# Patient Record
Sex: MALE | Race: WHITE | NOT HISPANIC OR LATINO | Employment: OTHER | URBAN - METROPOLITAN AREA
[De-identification: names, ages, dates, MRNs, and addresses within clinical notes are randomized per-mention and may not be internally consistent; named-entity substitution may affect disease eponyms.]

---

## 2023-10-12 ENCOUNTER — HOSPITAL ENCOUNTER (EMERGENCY)
Facility: HOSPITAL | Age: 83
Discharge: HOME | End: 2023-10-12
Attending: EMERGENCY MEDICINE
Payer: MEDICARE

## 2023-10-12 ENCOUNTER — APPOINTMENT (OUTPATIENT)
Dept: RADIOLOGY | Facility: HOSPITAL | Age: 83
End: 2023-10-12
Payer: MEDICARE

## 2023-10-12 VITALS
TEMPERATURE: 97.2 F | DIASTOLIC BLOOD PRESSURE: 79 MMHG | HEIGHT: 70 IN | WEIGHT: 130 LBS | HEART RATE: 56 BPM | BODY MASS INDEX: 18.61 KG/M2 | SYSTOLIC BLOOD PRESSURE: 132 MMHG | RESPIRATION RATE: 20 BRPM | OXYGEN SATURATION: 96 %
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
## 2023-10-12 LAB
ALBUMIN SERPL BCP-MCNC: 3.9 G/DL (ref 3.4–5)
ALP SERPL-CCNC: 63 U/L (ref 33–136)
ALT SERPL W P-5'-P-CCNC: 13 U/L (ref 10–52)
ANION GAP SERPL CALC-SCNC: 11 MMOL/L (ref 10–20)
APTT PPP: 29 SECONDS (ref 27–38)
AST SERPL W P-5'-P-CCNC: 19 U/L (ref 9–39)
BILIRUB SERPL-MCNC: 1.3 MG/DL (ref 0–1.2)
BUN SERPL-MCNC: 22 MG/DL (ref 6–23)
CALCIUM SERPL-MCNC: 8.9 MG/DL (ref 8.6–10.3)
CHLORIDE SERPL-SCNC: 100 MMOL/L (ref 98–107)
CO2 SERPL-SCNC: 32 MMOL/L (ref 21–32)
CREAT SERPL-MCNC: 0.73 MG/DL (ref 0.5–1.3)
ERYTHROCYTE [DISTWIDTH] IN BLOOD BY AUTOMATED COUNT: 13.9 % (ref 11.5–14.5)
GFR SERPL CREATININE-BSD FRML MDRD: >90 ML/MIN/1.73M*2
GLUCOSE SERPL-MCNC: 100 MG/DL (ref 74–99)
HCT VFR BLD AUTO: 44.1 % (ref 41–52)
HGB BLD-MCNC: 14.7 G/DL (ref 13.5–17.5)
INR PPP: 1.1 (ref 0.9–1.1)
MCH RBC QN AUTO: 30.5 PG (ref 26–34)
MCHC RBC AUTO-ENTMCNC: 33.3 G/DL (ref 32–36)
MCV RBC AUTO: 92 FL (ref 80–100)
NRBC BLD-RTO: 0 /100 WBCS (ref 0–0)
PLATELET # BLD AUTO: 151 X10*3/UL (ref 150–450)
PMV BLD AUTO: 10.7 FL (ref 7.5–11.5)
POTASSIUM SERPL-SCNC: 3.7 MMOL/L (ref 3.5–5.3)
PROT SERPL-MCNC: 6.2 G/DL (ref 6.4–8.2)
PROTHROMBIN TIME: 12 SECONDS (ref 9.8–12.8)
RBC # BLD AUTO: 4.82 X10*6/UL (ref 4.5–5.9)
SODIUM SERPL-SCNC: 139 MMOL/L (ref 136–145)
WBC # BLD AUTO: 7.7 X10*3/UL (ref 4.4–11.3)

## 2023-10-12 PROCEDURE — 36415 COLL VENOUS BLD VENIPUNCTURE: CPT

## 2023-10-12 PROCEDURE — 70450 CT HEAD/BRAIN W/O DYE: CPT | Mod: MG

## 2023-10-12 PROCEDURE — 70450 CT HEAD/BRAIN W/O DYE: CPT | Performed by: RADIOLOGY

## 2023-10-12 PROCEDURE — 70486 CT MAXILLOFACIAL W/O DYE: CPT | Performed by: RADIOLOGY

## 2023-10-12 PROCEDURE — 99285 EMERGENCY DEPT VISIT HI MDM: CPT | Mod: 25 | Performed by: EMERGENCY MEDICINE

## 2023-10-12 PROCEDURE — 85027 COMPLETE CBC AUTOMATED: CPT

## 2023-10-12 PROCEDURE — G1004 CDSM NDSC: HCPCS

## 2023-10-12 PROCEDURE — 72125 CT NECK SPINE W/O DYE: CPT | Performed by: RADIOLOGY

## 2023-10-12 PROCEDURE — 12011 RPR F/E/E/N/L/M 2.5 CM/<: CPT | Performed by: EMERGENCY MEDICINE

## 2023-10-12 PROCEDURE — 85730 THROMBOPLASTIN TIME PARTIAL: CPT

## 2023-10-12 PROCEDURE — G0390 TRAUMA RESPONS W/HOSP CRITI: HCPCS | Performed by: EMERGENCY MEDICINE

## 2023-10-12 PROCEDURE — 80053 COMPREHEN METABOLIC PANEL: CPT

## 2023-10-12 PROCEDURE — 2500000004 HC RX 250 GENERAL PHARMACY W/ HCPCS (ALT 636 FOR OP/ED)

## 2023-10-12 PROCEDURE — 2500000005 HC RX 250 GENERAL PHARMACY W/O HCPCS

## 2023-10-12 PROCEDURE — 99284 EMERGENCY DEPT VISIT MOD MDM: CPT | Performed by: EMERGENCY MEDICINE

## 2023-10-12 PROCEDURE — 70486 CT MAXILLOFACIAL W/O DYE: CPT | Mod: MG

## 2023-10-12 PROCEDURE — 90715 TDAP VACCINE 7 YRS/> IM: CPT

## 2023-10-12 PROCEDURE — 76377 3D RENDER W/INTRP POSTPROCES: CPT | Performed by: RADIOLOGY

## 2023-10-12 PROCEDURE — 85610 PROTHROMBIN TIME: CPT

## 2023-10-12 PROCEDURE — 90471 IMMUNIZATION ADMIN: CPT

## 2023-10-12 PROCEDURE — 12013 RPR F/E/E/N/L/M 2.6-5.0 CM: CPT | Performed by: EMERGENCY MEDICINE

## 2023-10-12 RX ORDER — LIDOCAINE HYDROCHLORIDE AND EPINEPHRINE 10; 10 MG/ML; UG/ML
1 INJECTION, SOLUTION INFILTRATION; PERINEURAL ONCE
Status: COMPLETED | OUTPATIENT
Start: 2023-10-12 | End: 2023-10-12

## 2023-10-12 RX ORDER — OXYMETAZOLINE HCL 0.05 %
2 SPRAY, NON-AEROSOL (ML) NASAL ONCE
Status: DISCONTINUED | OUTPATIENT
Start: 2023-10-12 | End: 2023-10-12 | Stop reason: HOSPADM

## 2023-10-12 RX ORDER — LIDOCAINE HYDROCHLORIDE 10 MG/ML
9 INJECTION INFILTRATION; PERINEURAL ONCE
Status: DISCONTINUED | OUTPATIENT
Start: 2023-10-12 | End: 2023-10-12

## 2023-10-12 RX ORDER — LIDOCAINE HYDROCHLORIDE AND EPINEPHRINE 10; 10 MG/ML; UG/ML
10 INJECTION, SOLUTION INFILTRATION; PERINEURAL ONCE
Status: DISCONTINUED | OUTPATIENT
Start: 2023-10-12 | End: 2023-10-12

## 2023-10-12 RX ORDER — ACETAMINOPHEN 325 MG/1
650 TABLET ORAL ONCE
Status: COMPLETED | OUTPATIENT
Start: 2023-10-12 | End: 2023-10-12

## 2023-10-12 RX ORDER — CEPHALEXIN 500 MG/1
500 CAPSULE ORAL 4 TIMES DAILY
Qty: 40 CAPSULE | Refills: 0 | Status: SHIPPED | OUTPATIENT
Start: 2023-10-12 | End: 2023-10-22

## 2023-10-12 RX ORDER — LIDOCAINE HYDROCHLORIDE AND EPINEPHRINE 10; 10 MG/ML; UG/ML
INJECTION, SOLUTION INFILTRATION; PERINEURAL
Status: DISCONTINUED
Start: 2023-10-12 | End: 2023-10-12 | Stop reason: HOSPADM

## 2023-10-12 RX ORDER — LIDOCAINE HYDROCHLORIDE 10 MG/ML
INJECTION, SOLUTION EPIDURAL; INFILTRATION; INTRACAUDAL; PERINEURAL
Status: DISCONTINUED
Start: 2023-10-12 | End: 2023-10-12 | Stop reason: WASHOUT

## 2023-10-12 RX ORDER — LIDOCAINE HYDROCHLORIDE AND EPINEPHRINE 10; 10 MG/ML; UG/ML
30 INJECTION, SOLUTION INFILTRATION; PERINEURAL ONCE
Status: DISCONTINUED | OUTPATIENT
Start: 2023-10-12 | End: 2023-10-12

## 2023-10-12 RX ADMIN — ACETAMINOPHEN 650 MG: 325 TABLET ORAL at 16:57

## 2023-10-12 RX ADMIN — TETANUS TOXOID, REDUCED DIPHTHERIA TOXOID AND ACELLULAR PERTUSSIS VACCINE, ADSORBED 0.5 ML: 5; 2.5; 8; 8; 2.5 SUSPENSION INTRAMUSCULAR at 15:45

## 2023-10-12 RX ADMIN — LIDOCAINE HYDROCHLORIDE AND EPINEPHRINE 1 ML: 10; 10 INJECTION, SOLUTION INFILTRATION; PERINEURAL at 16:26

## 2023-10-12 ASSESSMENT — PAIN SCALES - GENERAL
PAINLEVEL_OUTOF10: 3
PAINLEVEL_OUTOF10: 2
PAINLEVEL_OUTOF10: 2
PAINLEVEL_OUTOF10: 3
PAINLEVEL_OUTOF10: 2

## 2023-10-12 ASSESSMENT — LIFESTYLE VARIABLES
EVER FELT BAD OR GUILTY ABOUT YOUR DRINKING: NO
HAVE PEOPLE ANNOYED YOU BY CRITICIZING YOUR DRINKING: NO
HAVE YOU EVER FELT YOU SHOULD CUT DOWN ON YOUR DRINKING: NO
EVER HAD A DRINK FIRST THING IN THE MORNING TO STEADY YOUR NERVES TO GET RID OF A HANGOVER: NO
REASON UNABLE TO ASSESS: NO

## 2023-10-12 ASSESSMENT — COLUMBIA-SUICIDE SEVERITY RATING SCALE - C-SSRS
6. HAVE YOU EVER DONE ANYTHING, STARTED TO DO ANYTHING, OR PREPARED TO DO ANYTHING TO END YOUR LIFE?: NO
2. HAVE YOU ACTUALLY HAD ANY THOUGHTS OF KILLING YOURSELF?: NO
1. IN THE PAST MONTH, HAVE YOU WISHED YOU WERE DEAD OR WISHED YOU COULD GO TO SLEEP AND NOT WAKE UP?: NO

## 2023-10-12 ASSESSMENT — PAIN - FUNCTIONAL ASSESSMENT: PAIN_FUNCTIONAL_ASSESSMENT: 0-10

## 2023-10-12 NOTE — DISCHARGE INSTRUCTIONS
You are seen in the emergency room after an acute fall.  It appears that you broke your nose you should follow-up with the ENT as soon as possible.  You had a mild laceration to your forehead that was repaired with 3 sutures.  Please return in 7 to 10 days for suture removal.  He will be discharged with instructions on maintaining your lacerations.  If you have any fever new pain any focal neurological deficits such as dizziness numbness in your face or limbs or inability to walk straight please come back to emergency room as soon as possible.

## 2023-10-12 NOTE — ED NOTES
Discharge instructions reviewed with patient. No further questions or concerns at this time. Patient stable at time of discharge.      Marlin White RN  10/12/23 [id number]

## 2023-10-12 NOTE — ED TRIAGE NOTES
Pt presents to ED for mechanical fall. Pt stated he tripped over a parking block and fell. Pt did not brace himself and he hit his head. Pt denies loss of consciousness, denies blood thinners. Placed in C collar and nose clamp by EMS.

## 2023-10-12 NOTE — ED PROVIDER NOTES
HPI   Chief Complaint   Patient presents with    Fall     Mechanical fall       HPI       83-year-old male presents emergency room after a fall.  Patient indicates he was walking along and tripped over a parking block fell face first.  Patient did hit his head but had no apparent loss of consciousness.  HIA protocol was followed as the patient is on clopidogrel and takes aspirin.  Patient denies any headache or fever.  Patient was placed in a c-collar and nose clamp by EMS as he has a profusely bloody nose.    PMHx: Not available  PSHx: Not available  FHx: No pertinent family history  SocHX:     EtOH: Social alcohol use      Tobacco:  none     Other illicits:  none  Allergies:  NKDA    ROS  Constitutional:      Denies: Fever, fatigue     Reports: None  Neuro:      Denies: , numbness, tingling     Reports: Headache patient endorses a mild headache after the fall.  Indicates pain severity is 1-2 out of 10.  Psych:      Denies: anorexia, SI, HI     Reports: None  HEENT:      Denies: vision change, congestion, sore throat     Reports: None  Cardiovascular:      Denies: Chest pain, palpitations, orthopnea     Reports: None  Pulmonary:      Denies: shortness of breath, cough, hemoptysis     Reports: None  Gastrointestinal:      Denies: Abdominal pain, nausea, vomiting, constipation, diarrhea, bright red stools, black stools     Reports: None  Genitourinary:      Denies: Flank pain, painful urination, frequent urination, discharge     Reports: None  Musculoskeletal:     Denies: loss of ROM, extremity pain, back pain     Reports: None  Integumentary:     Denies: Rash, itching     Reports: None    PHYSICAL EXAM  Constitutional: Well appearing, no acute distress, oriented to person/place/time  Psych: Age appropriate insight, judgement, no psychomotor agitation  Neuro: GCS 15, spontaneously moves all extremities  Head: Patient has large hematoma directly in the center of his forehead.  As well as a possible fractured nose.   Patient has blood in both nares and a possible small laceration in the hematoma on his forehead measuring approximately 4 cm.  Eyes: Spontaneously open, PERRL, EOMI, no scleral icterus or conjunctival injection  ENT: No gross deformation, mucous membranes moist, trachea midline, no uvular deviation  Neck: Patient is in a neck collar.  Will reevaluate after CT scan  Respiratory:  respirations nonlabored, equal chest rise, no wheezes rales or rhonchi  Cardiovascular: Regular rate and rhythm,   distal pulses 2+ symmetric  Gastrointestinal: No gross deformation, normal bowel sounds, soft, nontender, nondistended  Musculoskeletal: Head to toe trauma assessment was conducted.  Patient denies any pain elsewhere other than his head where he received the laceration.  Integumentary: Warm, dry, no rashes    MDM/ED Course  83-year-old male presents emergency room after a fall.  Medical management and treatment in the emergency room will involve CT Noncon of the head cervical spine and maxillofacial area face.,  Blood work will be run.  We also ordered a tetanus shot for the patient.  The patient's forehead laceration was repaired with three 6-0 sutures.  The patient also had some mild abrasion to the tip of his nose.  The patient has mild abrasions that do not require lacerations to the rest of the forehead and inside the mouth.  The patient will be given Tylenol for pain management and most likely discharge today with take-home antibiotics and follow-up instructions.    Chronic conditions: See chart review.    External records review: inpatient notes, outpatient records  History obtained from: patient/chart review        I reviewed the case with the attending ED physician. The attending ED physician agrees with the plan. Patient and/or patient´s representative was counseled regarding labs, imaging, likely diagnosis, and plan. All questions were answered.  Suresh Tapia MD  PGY-1  Emergency Medicine      Please excuse any  misspellings or unintended errors related to the Dragon speech recognition software used to dictate this note.                Denis Coma Scale Score: 15                  Patient History   No past medical history on file.  No past surgical history on file.  No family history on file.  Social History     Tobacco Use    Smoking status: Not on file    Smokeless tobacco: Not on file   Substance Use Topics    Alcohol use: Not on file    Drug use: Not on file       Physical Exam   ED Triage Vitals [10/12/23 1317]   Temp Heart Rate Resp BP   36.4 °C (97.5 °F) 100 16 (!) 145/96      SpO2 Temp Source Heart Rate Source Patient Position   95 % Tympanic -- --      BP Location FiO2 (%)     -- --       Physical Exam    ED Course & MDM   Diagnoses as of 10/12/23 1742   Fall, initial encounter       Medical Decision Making      Procedure  Procedures patient had a mild laceration on his forehead between his eyebrows.  Required three 6-0 sutures.  The wound was cleaned with iodine and normal saline and anesthetized with epinephrine and lidocaine.  Approxi-1 mL.  Patient will be sent home with take-home instructions for removal of sutures and cleaning of his lacerations.     Suresh Tapia MD  Resident  10/12/23 1652       Suresh Tapia MD  Resident  10/12/23 1742

## 2024-12-01 ENCOUNTER — APPOINTMENT (OUTPATIENT)
Dept: RADIOLOGY | Facility: HOSPITAL | Age: 84
End: 2024-12-01
Payer: MEDICARE

## 2024-12-01 ENCOUNTER — HOSPITAL ENCOUNTER (OUTPATIENT)
Facility: HOSPITAL | Age: 84
Setting detail: OBSERVATION
End: 2024-12-01
Attending: EMERGENCY MEDICINE | Admitting: INTERNAL MEDICINE
Payer: MEDICARE

## 2024-12-01 ENCOUNTER — APPOINTMENT (OUTPATIENT)
Dept: CARDIOLOGY | Facility: HOSPITAL | Age: 84
End: 2024-12-01
Payer: MEDICARE

## 2024-12-01 DIAGNOSIS — I26.99 PULMONARY EMBOLISM ON RIGHT (MULTI): Primary | ICD-10-CM

## 2024-12-01 DIAGNOSIS — I26.99 ACUTE PULMONARY EMBOLISM WITHOUT ACUTE COR PULMONALE, UNSPECIFIED PULMONARY EMBOLISM TYPE (MULTI): ICD-10-CM
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
## 2024-12-01 PROBLEM — I10 HTN (HYPERTENSION): Status: ACTIVE | Noted: 2024-12-01

## 2024-12-01 PROBLEM — E78.89 ELEVATED HDL: Status: ACTIVE | Noted: 2024-12-01

## 2024-12-01 PROBLEM — J96.11 CHRONIC HYPOXIC RESPIRATORY FAILURE, ON HOME OXYGEN THERAPY (MULTI): Status: ACTIVE | Noted: 2024-12-01

## 2024-12-01 PROBLEM — Z99.81 CHRONIC HYPOXIC RESPIRATORY FAILURE, ON HOME OXYGEN THERAPY (MULTI): Status: ACTIVE | Noted: 2024-12-01

## 2024-12-01 PROBLEM — R91.1 INCIDENTAL PULMONARY NODULE: Status: ACTIVE | Noted: 2024-12-01

## 2024-12-01 LAB
ABO GROUP (TYPE) IN BLOOD: NORMAL
ALBUMIN SERPL BCP-MCNC: 3.9 G/DL (ref 3.4–5)
ALP SERPL-CCNC: 75 U/L (ref 33–136)
ALT SERPL W P-5'-P-CCNC: 43 U/L (ref 10–52)
ANION GAP SERPL CALC-SCNC: 14 MMOL/L (ref 10–20)
ANTIBODY SCREEN: NORMAL
APPEARANCE UR: ABNORMAL
AST SERPL W P-5'-P-CCNC: 126 U/L (ref 9–39)
BACTERIA BLD CULT: NORMAL
BACTERIA BLD CULT: NORMAL
BASOPHILS # BLD AUTO: 0.02 X10*3/UL (ref 0–0.1)
BASOPHILS NFR BLD AUTO: 0.1 %
BILIRUB SERPL-MCNC: 1.3 MG/DL (ref 0–1.2)
BILIRUB UR STRIP.AUTO-MCNC: NEGATIVE MG/DL
BNP SERPL-MCNC: 1067 PG/ML (ref 0–99)
BUN SERPL-MCNC: 56 MG/DL (ref 6–23)
CALCIUM SERPL-MCNC: 9.5 MG/DL (ref 8.6–10.3)
CARDIAC TROPONIN I PNL SERPL HS: 113 NG/L (ref 0–20)
CARDIAC TROPONIN I PNL SERPL HS: 127 NG/L (ref 0–20)
CARDIAC TROPONIN I PNL SERPL HS: 137 NG/L (ref 0–20)
CHLORIDE SERPL-SCNC: 105 MMOL/L (ref 98–107)
CK SERPL-CCNC: 2745 U/L (ref 0–325)
CK SERPL-CCNC: 2758 U/L (ref 0–325)
CO2 SERPL-SCNC: 33 MMOL/L (ref 21–32)
COLOR UR: YELLOW
CREAT SERPL-MCNC: 0.97 MG/DL (ref 0.5–1.3)
D DIMER PPP FEU-MCNC: ABNORMAL NG/ML FEU
EGFRCR SERPLBLD CKD-EPI 2021: 78 ML/MIN/1.73M*2
EOSINOPHIL # BLD AUTO: 0 X10*3/UL (ref 0–0.4)
EOSINOPHIL NFR BLD AUTO: 0 %
ERYTHROCYTE [DISTWIDTH] IN BLOOD BY AUTOMATED COUNT: 13.9 % (ref 11.5–14.5)
ETHANOL SERPL-MCNC: <10 MG/DL
GLUCOSE SERPL-MCNC: 118 MG/DL (ref 74–99)
GLUCOSE UR STRIP.AUTO-MCNC: NORMAL MG/DL
HCT VFR BLD AUTO: 46.3 % (ref 41–52)
HGB BLD-MCNC: 14.6 G/DL (ref 13.5–17.5)
HOLD SPECIMEN: NORMAL
HYALINE CASTS #/AREA URNS AUTO: ABNORMAL /LPF
IMM GRANULOCYTES # BLD AUTO: 0.19 X10*3/UL (ref 0–0.5)
IMM GRANULOCYTES NFR BLD AUTO: 0.9 % (ref 0–0.9)
INR PPP: 1 (ref 0.9–1.1)
KETONES UR STRIP.AUTO-MCNC: ABNORMAL MG/DL
LACTATE SERPL-SCNC: 1.8 MMOL/L (ref 0.4–2)
LEUKOCYTE ESTERASE UR QL STRIP.AUTO: NEGATIVE
LYMPHOCYTES # BLD AUTO: 1.01 X10*3/UL (ref 0.8–3)
LYMPHOCYTES NFR BLD AUTO: 4.8 %
MCH RBC QN AUTO: 29.7 PG (ref 26–34)
MCHC RBC AUTO-ENTMCNC: 31.5 G/DL (ref 32–36)
MCV RBC AUTO: 94 FL (ref 80–100)
MONOCYTES # BLD AUTO: 1.54 X10*3/UL (ref 0.05–0.8)
MONOCYTES NFR BLD AUTO: 7.4 %
MUCOUS THREADS #/AREA URNS AUTO: ABNORMAL /LPF
NEUTROPHILS # BLD AUTO: 18.17 X10*3/UL (ref 1.6–5.5)
NEUTROPHILS NFR BLD AUTO: 86.8 %
NITRITE UR QL STRIP.AUTO: NEGATIVE
NRBC BLD-RTO: 0 /100 WBCS (ref 0–0)
PH UR STRIP.AUTO: 6 [PH]
PLATELET # BLD AUTO: 206 X10*3/UL (ref 150–450)
POTASSIUM SERPL-SCNC: 4.6 MMOL/L (ref 3.5–5.3)
PROT SERPL-MCNC: 7 G/DL (ref 6.4–8.2)
PROT UR STRIP.AUTO-MCNC: ABNORMAL MG/DL
PROTHROMBIN TIME: 10.7 SECONDS (ref 9.8–12.8)
RBC # BLD AUTO: 4.91 X10*6/UL (ref 4.5–5.9)
RBC # UR STRIP.AUTO: ABNORMAL /UL
RBC #/AREA URNS AUTO: ABNORMAL /HPF
RH FACTOR (ANTIGEN D): NORMAL
SODIUM SERPL-SCNC: 147 MMOL/L (ref 136–145)
SP GR UR STRIP.AUTO: >1.05
SQUAMOUS #/AREA URNS AUTO: ABNORMAL /HPF
UFH PPP CHRO-ACNC: 0.5 IU/ML
UROBILINOGEN UR STRIP.AUTO-MCNC: NORMAL MG/DL
WBC # BLD AUTO: 20.9 X10*3/UL (ref 4.4–11.3)
WBC #/AREA URNS AUTO: ABNORMAL /HPF

## 2024-12-01 PROCEDURE — 74177 CT ABD & PELVIS W/CONTRAST: CPT | Mod: RCN | Performed by: RADIOLOGY

## 2024-12-01 PROCEDURE — 96365 THER/PROPH/DIAG IV INF INIT: CPT

## 2024-12-01 PROCEDURE — 86900 BLOOD TYPING SEROLOGIC ABO: CPT

## 2024-12-01 PROCEDURE — 93005 ELECTROCARDIOGRAM TRACING: CPT

## 2024-12-01 PROCEDURE — 2500000004 HC RX 250 GENERAL PHARMACY W/ HCPCS (ALT 636 FOR OP/ED): Performed by: EMERGENCY MEDICINE

## 2024-12-01 PROCEDURE — 2500000001 HC RX 250 WO HCPCS SELF ADMINISTERED DRUGS (ALT 637 FOR MEDICARE OP): Performed by: INTERNAL MEDICINE

## 2024-12-01 PROCEDURE — 90471 IMMUNIZATION ADMIN: CPT

## 2024-12-01 PROCEDURE — 72128 CT CHEST SPINE W/O DYE: CPT | Mod: RCN

## 2024-12-01 PROCEDURE — 36415 COLL VENOUS BLD VENIPUNCTURE: CPT

## 2024-12-01 PROCEDURE — 73590 X-RAY EXAM OF LOWER LEG: CPT | Mod: BILATERAL PROCEDURE | Performed by: RADIOLOGY

## 2024-12-01 PROCEDURE — 80053 COMPREHEN METABOLIC PANEL: CPT

## 2024-12-01 PROCEDURE — 99285 EMERGENCY DEPT VISIT HI MDM: CPT | Mod: 25

## 2024-12-01 PROCEDURE — 72131 CT LUMBAR SPINE W/O DYE: CPT | Mod: RCN

## 2024-12-01 PROCEDURE — 83605 ASSAY OF LACTIC ACID: CPT

## 2024-12-01 PROCEDURE — 72128 CT CHEST SPINE W/O DYE: CPT | Mod: RCN | Performed by: RADIOLOGY

## 2024-12-01 PROCEDURE — 51798 US URINE CAPACITY MEASURE: CPT

## 2024-12-01 PROCEDURE — 84484 ASSAY OF TROPONIN QUANT: CPT

## 2024-12-01 PROCEDURE — 70450 CT HEAD/BRAIN W/O DYE: CPT

## 2024-12-01 PROCEDURE — 87040 BLOOD CULTURE FOR BACTERIA: CPT | Mod: STJLAB

## 2024-12-01 PROCEDURE — 73590 X-RAY EXAM OF LOWER LEG: CPT | Mod: 50

## 2024-12-01 PROCEDURE — 82077 ASSAY SPEC XCP UR&BREATH IA: CPT

## 2024-12-01 PROCEDURE — 82550 ASSAY OF CK (CPK): CPT

## 2024-12-01 PROCEDURE — 96361 HYDRATE IV INFUSION ADD-ON: CPT

## 2024-12-01 PROCEDURE — 2500000004 HC RX 250 GENERAL PHARMACY W/ HCPCS (ALT 636 FOR OP/ED)

## 2024-12-01 PROCEDURE — 72170 X-RAY EXAM OF PELVIS: CPT

## 2024-12-01 PROCEDURE — 83880 ASSAY OF NATRIURETIC PEPTIDE: CPT

## 2024-12-01 PROCEDURE — 71260 CT THORAX DX C+: CPT | Mod: RCN | Performed by: RADIOLOGY

## 2024-12-01 PROCEDURE — 71101 X-RAY EXAM UNILAT RIBS/CHEST: CPT | Mod: LT

## 2024-12-01 PROCEDURE — 71101 X-RAY EXAM UNILAT RIBS/CHEST: CPT | Mod: LEFT SIDE | Performed by: RADIOLOGY

## 2024-12-01 PROCEDURE — 2500000005 HC RX 250 GENERAL PHARMACY W/O HCPCS: Performed by: EMERGENCY MEDICINE

## 2024-12-01 PROCEDURE — 73552 X-RAY EXAM OF FEMUR 2/>: CPT | Mod: 50

## 2024-12-01 PROCEDURE — 96366 THER/PROPH/DIAG IV INF ADDON: CPT

## 2024-12-01 PROCEDURE — 72125 CT NECK SPINE W/O DYE: CPT

## 2024-12-01 PROCEDURE — 72170 X-RAY EXAM OF PELVIS: CPT | Mod: FOREIGN READ | Performed by: RADIOLOGY

## 2024-12-01 PROCEDURE — 85379 FIBRIN DEGRADATION QUANT: CPT

## 2024-12-01 PROCEDURE — 71275 CT ANGIOGRAPHY CHEST: CPT

## 2024-12-01 PROCEDURE — 99285 EMERGENCY DEPT VISIT HI MDM: CPT | Performed by: EMERGENCY MEDICINE

## 2024-12-01 PROCEDURE — 99222 1ST HOSP IP/OBS MODERATE 55: CPT | Performed by: NURSE PRACTITIONER

## 2024-12-01 PROCEDURE — G0378 HOSPITAL OBSERVATION PER HR: HCPCS

## 2024-12-01 PROCEDURE — 96367 TX/PROPH/DG ADDL SEQ IV INF: CPT

## 2024-12-01 PROCEDURE — G0390 TRAUMA RESPONS W/HOSP CRITI: HCPCS

## 2024-12-01 PROCEDURE — 74177 CT ABD & PELVIS W/CONTRAST: CPT

## 2024-12-01 PROCEDURE — 85610 PROTHROMBIN TIME: CPT

## 2024-12-01 PROCEDURE — 71275 CT ANGIOGRAPHY CHEST: CPT | Mod: FOREIGN READ | Performed by: RADIOLOGY

## 2024-12-01 PROCEDURE — 81001 URINALYSIS AUTO W/SCOPE: CPT

## 2024-12-01 PROCEDURE — 85520 HEPARIN ASSAY: CPT | Performed by: INTERNAL MEDICINE

## 2024-12-01 PROCEDURE — 85025 COMPLETE CBC W/AUTO DIFF WBC: CPT

## 2024-12-01 PROCEDURE — 72125 CT NECK SPINE W/O DYE: CPT | Performed by: RADIOLOGY

## 2024-12-01 PROCEDURE — 70450 CT HEAD/BRAIN W/O DYE: CPT | Performed by: RADIOLOGY

## 2024-12-01 PROCEDURE — 72131 CT LUMBAR SPINE W/O DYE: CPT | Mod: RCN | Performed by: RADIOLOGY

## 2024-12-01 PROCEDURE — 73552 X-RAY EXAM OF FEMUR 2/>: CPT | Mod: BILATERAL PROCEDURE | Performed by: RADIOLOGY

## 2024-12-01 PROCEDURE — 90715 TDAP VACCINE 7 YRS/> IM: CPT

## 2024-12-01 PROCEDURE — 2550000001 HC RX 255 CONTRASTS: Performed by: EMERGENCY MEDICINE

## 2024-12-01 RX ORDER — ACETAMINOPHEN 650 MG/1
650 SUPPOSITORY RECTAL EVERY 4 HOURS PRN
Status: DISCONTINUED | OUTPATIENT
Start: 2024-12-01 | End: 2024-12-06 | Stop reason: HOSPADM

## 2024-12-01 RX ORDER — ALBUTEROL SULFATE 0.83 MG/ML
2.5 SOLUTION RESPIRATORY (INHALATION) EVERY 6 HOURS PRN
Status: DISCONTINUED | OUTPATIENT
Start: 2024-12-01 | End: 2024-12-06 | Stop reason: HOSPADM

## 2024-12-01 RX ORDER — CLOPIDOGREL BISULFATE 75 MG/1
75 TABLET ORAL DAILY
Status: DISCONTINUED | OUTPATIENT
Start: 2024-12-01 | End: 2024-12-06 | Stop reason: HOSPADM

## 2024-12-01 RX ORDER — FLUTICASONE FUROATE AND VILANTEROL 200; 25 UG/1; UG/1
1 POWDER RESPIRATORY (INHALATION)
Status: DISCONTINUED | OUTPATIENT
Start: 2024-12-02 | End: 2024-12-01

## 2024-12-01 RX ORDER — HEPARIN SODIUM 5000 [USP'U]/ML
80 INJECTION, SOLUTION INTRAVENOUS; SUBCUTANEOUS ONCE
Status: COMPLETED | OUTPATIENT
Start: 2024-12-01 | End: 2024-12-01

## 2024-12-01 RX ORDER — TIOTROPIUM BROMIDE 18 UG/1
1 CAPSULE ORAL; RESPIRATORY (INHALATION)
Status: DISCONTINUED | OUTPATIENT
Start: 2024-12-02 | End: 2024-12-01

## 2024-12-01 RX ORDER — VANCOMYCIN HYDROCHLORIDE 500 MG/100ML
500 INJECTION, SOLUTION INTRAVENOUS ONCE
Status: COMPLETED | OUTPATIENT
Start: 2024-12-01 | End: 2024-12-01

## 2024-12-01 RX ORDER — TAMSULOSIN HYDROCHLORIDE 0.4 MG/1
0.4 CAPSULE ORAL NIGHTLY
COMMUNITY

## 2024-12-01 RX ORDER — BUDESONIDE 0.5 MG/2ML
0.5 INHALANT ORAL
Status: DISCONTINUED | OUTPATIENT
Start: 2024-12-01 | End: 2024-12-06 | Stop reason: HOSPADM

## 2024-12-01 RX ORDER — ALBUTEROL SULFATE 90 UG/1
2 INHALANT RESPIRATORY (INHALATION) EVERY 6 HOURS PRN
Status: DISCONTINUED | OUTPATIENT
Start: 2024-12-01 | End: 2024-12-01

## 2024-12-01 RX ORDER — DONEPEZIL HYDROCHLORIDE 10 MG/1
10 TABLET, FILM COATED ORAL NIGHTLY
COMMUNITY

## 2024-12-01 RX ORDER — ATORVASTATIN CALCIUM 20 MG/1
20 TABLET, FILM COATED ORAL DAILY
COMMUNITY

## 2024-12-01 RX ORDER — VANCOMYCIN HYDROCHLORIDE 1 G/200ML
1 INJECTION, SOLUTION INTRAVENOUS ONCE
Status: DISCONTINUED | OUTPATIENT
Start: 2024-12-01 | End: 2024-12-01

## 2024-12-01 RX ORDER — CARVEDILOL 3.12 MG/1
3.12 TABLET ORAL 2 TIMES DAILY
Status: DISCONTINUED | OUTPATIENT
Start: 2024-12-01 | End: 2024-12-02

## 2024-12-01 RX ORDER — CLOPIDOGREL BISULFATE 75 MG/1
75 TABLET ORAL DAILY
COMMUNITY

## 2024-12-01 RX ORDER — ACETAMINOPHEN 325 MG/1
650 TABLET ORAL EVERY 4 HOURS PRN
Status: DISCONTINUED | OUTPATIENT
Start: 2024-12-01 | End: 2024-12-06 | Stop reason: HOSPADM

## 2024-12-01 RX ORDER — MIRTAZAPINE 15 MG/1
7.5 TABLET, FILM COATED ORAL NIGHTLY
Status: DISCONTINUED | OUTPATIENT
Start: 2024-12-01 | End: 2024-12-06 | Stop reason: HOSPADM

## 2024-12-01 RX ORDER — CARVEDILOL 6.25 MG/1
3.12 TABLET ORAL 2 TIMES DAILY
COMMUNITY

## 2024-12-01 RX ORDER — MIRTAZAPINE 15 MG/1
7.5 TABLET, FILM COATED ORAL NIGHTLY
COMMUNITY

## 2024-12-01 RX ORDER — ONDANSETRON 4 MG/1
4 TABLET, FILM COATED ORAL EVERY 8 HOURS PRN
Status: DISCONTINUED | OUTPATIENT
Start: 2024-12-01 | End: 2024-12-06 | Stop reason: HOSPADM

## 2024-12-01 RX ORDER — FORMOTEROL FUMARATE DIHYDRATE 20 UG/2ML
20 SOLUTION RESPIRATORY (INHALATION)
Status: DISCONTINUED | OUTPATIENT
Start: 2024-12-01 | End: 2024-12-06 | Stop reason: HOSPADM

## 2024-12-01 RX ORDER — ALBUTEROL SULFATE 90 UG/1
2 INHALANT RESPIRATORY (INHALATION) EVERY 6 HOURS PRN
COMMUNITY

## 2024-12-01 RX ORDER — HEPARIN SODIUM 5000 [USP'U]/ML
2000-4000 INJECTION, SOLUTION INTRAVENOUS; SUBCUTANEOUS EVERY 4 HOURS PRN
Status: DISCONTINUED | OUTPATIENT
Start: 2024-12-01 | End: 2024-12-06

## 2024-12-01 RX ORDER — AMOXICILLIN 250 MG
2 CAPSULE ORAL 2 TIMES DAILY
Status: DISCONTINUED | OUTPATIENT
Start: 2024-12-01 | End: 2024-12-06 | Stop reason: HOSPADM

## 2024-12-01 RX ORDER — IPRATROPIUM BROMIDE 0.5 MG/2.5ML
0.5 SOLUTION RESPIRATORY (INHALATION)
Status: DISCONTINUED | OUTPATIENT
Start: 2024-12-01 | End: 2024-12-02

## 2024-12-01 RX ORDER — TAMSULOSIN HYDROCHLORIDE 0.4 MG/1
0.4 CAPSULE ORAL NIGHTLY
Status: DISCONTINUED | OUTPATIENT
Start: 2024-12-01 | End: 2024-12-06 | Stop reason: HOSPADM

## 2024-12-01 RX ORDER — DONEPEZIL HYDROCHLORIDE 10 MG/1
10 TABLET, FILM COATED ORAL NIGHTLY
Status: DISCONTINUED | OUTPATIENT
Start: 2024-12-01 | End: 2024-12-06 | Stop reason: HOSPADM

## 2024-12-01 RX ORDER — BUDESONIDE AND FORMOTEROL FUMARATE DIHYDRATE 160; 4.5 UG/1; UG/1
2 AEROSOL RESPIRATORY (INHALATION)
COMMUNITY

## 2024-12-01 RX ORDER — HEPARIN SODIUM 10000 [USP'U]/100ML
0-4500 INJECTION, SOLUTION INTRAVENOUS CONTINUOUS
Status: DISCONTINUED | OUTPATIENT
Start: 2024-12-01 | End: 2024-12-06 | Stop reason: HOSPADM

## 2024-12-01 RX ORDER — TIOTROPIUM BROMIDE 18 UG/1
1 CAPSULE ORAL; RESPIRATORY (INHALATION)
COMMUNITY

## 2024-12-01 RX ORDER — ONDANSETRON HYDROCHLORIDE 2 MG/ML
4 INJECTION, SOLUTION INTRAVENOUS EVERY 8 HOURS PRN
Status: DISCONTINUED | OUTPATIENT
Start: 2024-12-01 | End: 2024-12-06 | Stop reason: HOSPADM

## 2024-12-01 RX ORDER — ATORVASTATIN CALCIUM 20 MG/1
20 TABLET, FILM COATED ORAL NIGHTLY
Status: DISCONTINUED | OUTPATIENT
Start: 2024-12-01 | End: 2024-12-06 | Stop reason: HOSPADM

## 2024-12-01 RX ORDER — ACETAMINOPHEN 160 MG/5ML
650 SOLUTION ORAL EVERY 4 HOURS PRN
Status: DISCONTINUED | OUTPATIENT
Start: 2024-12-01 | End: 2024-12-06 | Stop reason: HOSPADM

## 2024-12-01 RX ADMIN — CARVEDILOL 3.12 MG: 3.12 TABLET, FILM COATED ORAL at 21:05

## 2024-12-01 RX ADMIN — DONEPEZIL HYDROCHLORIDE 10 MG: 10 TABLET ORAL at 21:05

## 2024-12-01 RX ADMIN — VANCOMYCIN HYDROCHLORIDE 500 MG: 500 INJECTION, SOLUTION INTRAVENOUS at 16:03

## 2024-12-01 RX ADMIN — HEPARIN SODIUM 3450 UNITS: 5000 INJECTION, SOLUTION INTRAVENOUS; SUBCUTANEOUS at 16:25

## 2024-12-01 RX ADMIN — ATORVASTATIN CALCIUM 20 MG: 20 TABLET, FILM COATED ORAL at 21:06

## 2024-12-01 RX ADMIN — PIPERACILLIN SODIUM AND TAZOBACTAM SODIUM 4.5 G: 4; .5 INJECTION, SOLUTION INTRAVENOUS at 14:03

## 2024-12-01 RX ADMIN — CLOPIDOGREL BISULFATE 75 MG: 75 TABLET ORAL at 21:06

## 2024-12-01 RX ADMIN — MIRTAZAPINE 7.5 MG: 15 TABLET, FILM COATED ORAL at 21:05

## 2024-12-01 RX ADMIN — SODIUM CHLORIDE, POTASSIUM CHLORIDE, SODIUM LACTATE AND CALCIUM CHLORIDE 1000 ML: 600; 310; 30; 20 INJECTION, SOLUTION INTRAVENOUS at 12:21

## 2024-12-01 RX ADMIN — IOHEXOL 60 ML: 350 INJECTION, SOLUTION INTRAVENOUS at 12:56

## 2024-12-01 RX ADMIN — HEPARIN SODIUM 800 UNITS/HR: 10000 INJECTION, SOLUTION INTRAVENOUS at 16:24

## 2024-12-01 RX ADMIN — Medication 4 L/MIN: at 14:30

## 2024-12-01 RX ADMIN — TETANUS TOXOID, REDUCED DIPHTHERIA TOXOID AND ACELLULAR PERTUSSIS VACCINE, ADSORBED 0.5 ML: 5; 2.5; 8; 8; 2.5 SUSPENSION INTRAMUSCULAR at 14:03

## 2024-12-01 RX ADMIN — IOHEXOL 60 ML: 350 INJECTION, SOLUTION INTRAVENOUS at 15:48

## 2024-12-01 ASSESSMENT — LIFESTYLE VARIABLES
HAVE YOU EVER FELT YOU SHOULD CUT DOWN ON YOUR DRINKING: NO
TOTAL SCORE: 0
HAVE PEOPLE ANNOYED YOU BY CRITICIZING YOUR DRINKING: NO
EVER HAD A DRINK FIRST THING IN THE MORNING TO STEADY YOUR NERVES TO GET RID OF A HANGOVER: NO
EVER FELT BAD OR GUILTY ABOUT YOUR DRINKING: NO

## 2024-12-01 ASSESSMENT — PAIN SCALES - GENERAL: PAINLEVEL_OUTOF10: 10 - WORST POSSIBLE PAIN

## 2024-12-01 ASSESSMENT — COLUMBIA-SUICIDE SEVERITY RATING SCALE - C-SSRS
6. HAVE YOU EVER DONE ANYTHING, STARTED TO DO ANYTHING, OR PREPARED TO DO ANYTHING TO END YOUR LIFE?: NO
1. IN THE PAST MONTH, HAVE YOU WISHED YOU WERE DEAD OR WISHED YOU COULD GO TO SLEEP AND NOT WAKE UP?: NO
2. HAVE YOU ACTUALLY HAD ANY THOUGHTS OF KILLING YOURSELF?: NO

## 2024-12-01 ASSESSMENT — PAIN - FUNCTIONAL ASSESSMENT: PAIN_FUNCTIONAL_ASSESSMENT: 0-10

## 2024-12-01 NOTE — ASSESSMENT & PLAN NOTE
Again noted are approximately 3 nodular lesions involving the left upper lobe, left lower lobe and right lower lobe unchanged from exam from the same day measuring up to approximately 2 cm involving the left upper lobe and left lower lobe.  Findings concerning for possible neoplasm.  Recommend further evaluation with PET/CT.     Will require outpatient follow up

## 2024-12-01 NOTE — H&P
History Of Present Illness  H&P obtained by chart review and daughter at bedside d/t patient confusion.    Lorne Avina is a 84 y.o. male with Pmhx of  HTN, HLD, infrarenal AAA, Hx of SVT s/p ablation, CVA with residual left-sided weakness, chronic hypoxic respiratory failure 2/2 COPD on 2 L NC prn, BPH and mild cognitive impairment who presented to ED after being found by daughter at home.  Believed to be down for approximately 3 days.  Patient reportedly was found to be wedged between his bed and nightstand, incontinent of urine. Upon discovery patient's daughter called 911, and patient was sent to [facility].     Upon arrival to the emergency room patient remained confused A&O x1, to self, limited ability to follow commands. A&A x 1 to self.  Patient was noted to be hypotensive 80s over 50s and tachycardic with a heart rate in the 120s with tachypnea with respiratory rate in the high 20s. Treated with 2 L normal saline pressure improved and stabilized.  Initially on nonrebreather successfully weaned down to 2 L nasal cannula.  Given one-time dose of IV vancomycin and Zosyn. Lab work significant for leukocytosis, afebrile, elevated CK level of 2758, BNP 1067, elevated troponins of 137 and 127, D-dimer of 15,893, urine tox and alcohol tox negative. Urine significant for turbid appearance but negative for UTI.     CT of cervical spine, head, lumbar spine, thoracic spine negative.  X-ray of the femur pelvis ribs and tib-fib without significant findings.  CT angio positive for Single pulmonary embolism involving the right interlobar pulmonary artery extending into the anterobasilar segment.  Minimal embolic burden, no evidence of right heart strain.  Additional imaging reviewed without significant findings    At the time of my exam patient remained confused, limited ability to follow commands. On 2 L nasal cannula with an O2 sat of 94%, remained tachycardic on telemetry.  Patient was admitted for further  Medical management and evaluation.     Resides alone at home. He previously declined skilled nursing facility or assisted living placement.    Past Medical History  He has no past medical history on file.    Surgical History  He has no past surgical history on file.     Social History  He reports that he has quit smoking. His smoking use included cigarettes. He has never used smokeless tobacco. He reports current alcohol use. He reports that he does not use drugs.    Family History  No family history on file.     Allergies  Patient has no known allergies.    Review of Systems   Reason unable to perform ROS: Altered mental status.        Physical Exam  Vitals reviewed.   HENT:      Right Ear: Tympanic membrane normal.      Left Ear: Tympanic membrane normal.   Eyes:      Comments: Facial trauma    Cardiovascular:      Rate and Rhythm: Regular rhythm. Tachycardia present.   Pulmonary:      Effort: Pulmonary effort is normal. No respiratory distress.      Breath sounds: Normal breath sounds.      Comments: 2L NC   Abdominal:      General: Abdomen is flat. Bowel sounds are normal.      Palpations: Abdomen is soft.   Musculoskeletal:         General: Normal range of motion.   Skin:     General: Skin is warm.      Findings: Bruising present.      Comments: Facial bruising and traumatic ulcers noted    Neurological:      Mental Status: He is lethargic and disoriented.      Comments: CVA with residual left-sided weakness     Last Recorded Vitals  BP 94/80   Pulse (!) 108   Temp 36.5 °C (97.7 °F) (Temporal)   Resp 15   Wt (!) 43.1 kg (95 lb)   SpO2 100%     Relevant Results  Scheduled medications     Continuous medications  heparin, 0-4,500 Units/hr, Last Rate: 800 Units/hr (12/01/24 1624)      PRN medications  Results for orders placed or performed during the hospital encounter of 12/01/24 (from the past 24 hours)   CBC and Auto Differential   Result Value Ref Range    WBC 20.9 (H) 4.4 - 11.3 x10*3/uL    nRBC 0.0 0.0 -  0.0 /100 WBCs    RBC 4.91 4.50 - 5.90 x10*6/uL    Hemoglobin 14.6 13.5 - 17.5 g/dL    Hematocrit 46.3 41.0 - 52.0 %    MCV 94 80 - 100 fL    MCH 29.7 26.0 - 34.0 pg    MCHC 31.5 (L) 32.0 - 36.0 g/dL    RDW 13.9 11.5 - 14.5 %    Platelets 206 150 - 450 x10*3/uL    Neutrophils % 86.8 40.0 - 80.0 %    Immature Granulocytes %, Automated 0.9 0.0 - 0.9 %    Lymphocytes % 4.8 13.0 - 44.0 %    Monocytes % 7.4 2.0 - 10.0 %    Eosinophils % 0.0 0.0 - 6.0 %    Basophils % 0.1 0.0 - 2.0 %    Neutrophils Absolute 18.17 (H) 1.60 - 5.50 x10*3/uL    Immature Granulocytes Absolute, Automated 0.19 0.00 - 0.50 x10*3/uL    Lymphocytes Absolute 1.01 0.80 - 3.00 x10*3/uL    Monocytes Absolute 1.54 (H) 0.05 - 0.80 x10*3/uL    Eosinophils Absolute 0.00 0.00 - 0.40 x10*3/uL    Basophils Absolute 0.02 0.00 - 0.10 x10*3/uL   Comprehensive Metabolic Panel   Result Value Ref Range    Glucose 118 (H) 74 - 99 mg/dL    Sodium 147 (H) 136 - 145 mmol/L    Potassium 4.6 3.5 - 5.3 mmol/L    Chloride 105 98 - 107 mmol/L    Bicarbonate 33 (H) 21 - 32 mmol/L    Anion Gap 14 10 - 20 mmol/L    Urea Nitrogen 56 (H) 6 - 23 mg/dL    Creatinine 0.97 0.50 - 1.30 mg/dL    eGFR 78 >60 mL/min/1.73m*2    Calcium 9.5 8.6 - 10.3 mg/dL    Albumin 3.9 3.4 - 5.0 g/dL    Alkaline Phosphatase 75 33 - 136 U/L    Total Protein 7.0 6.4 - 8.2 g/dL     (H) 9 - 39 U/L    Bilirubin, Total 1.3 (H) 0.0 - 1.2 mg/dL    ALT 43 10 - 52 U/L   Lactate   Result Value Ref Range    Lactate 1.8 0.4 - 2.0 mmol/L   Troponin I, High Sensitivity   Result Value Ref Range    Troponin I, High Sensitivity 113 (HH) 0 - 20 ng/L   Creatine Kinase   Result Value Ref Range    Creatine Kinase 2,758 (H) 0 - 325 U/L   Alcohol   Result Value Ref Range    Alcohol <10 <=10 mg/dL   Protime-INR   Result Value Ref Range    Protime 10.7 9.8 - 12.8 seconds    INR 1.0 0.9 - 1.1   Type And Screen   Result Value Ref Range    ABO TYPE O     Rh TYPE POS     ANTIBODY SCREEN NEG    D-Dimer, Quantitative Non VTE    Result Value Ref Range    D-Dimer Non VTE, Quant (ng/mL FEU) 15,893 (H) <=500 ng/mL FEU   Red Top   Result Value Ref Range    Extra Tube Hold for add-ons.    Green Top   Result Value Ref Range    Extra Tube Hold for add-ons.    Lavender Top   Result Value Ref Range    Extra Tube Hold for add-ons.    SST TOP   Result Value Ref Range    Extra Tube Hold for add-ons.    B-type Natriuretic Peptide   Result Value Ref Range    BNP 1,067 (H) 0 - 99 pg/mL   Troponin I, High Sensitivity   Result Value Ref Range    Troponin I, High Sensitivity 137 (HH) 0 - 20 ng/L   Urinalysis with Reflex Culture and Microscopic   Result Value Ref Range    Color, Urine Yellow Light-Yellow, Yellow, Dark-Yellow    Appearance, Urine Turbid (N) Clear    Specific Gravity, Urine >1.050 (N) 1.005 - 1.035    pH, Urine 6.0 5.0, 5.5, 6.0, 6.5, 7.0, 7.5, 8.0    Protein, Urine 50 (1+) (A) NEGATIVE, 10 (TRACE), 20 (TRACE) mg/dL    Glucose, Urine Normal Normal mg/dL    Blood, Urine 0.2 (2+) (A) NEGATIVE    Ketones, Urine 20 (1+) (A) NEGATIVE mg/dL    Bilirubin, Urine NEGATIVE NEGATIVE    Urobilinogen, Urine Normal Normal mg/dL    Nitrite, Urine NEGATIVE NEGATIVE    Leukocyte Esterase, Urine NEGATIVE NEGATIVE   Urinalysis Microscopic   Result Value Ref Range    WBC, Urine 1-5 1-5, NONE /HPF    RBC, Urine 11-20 (A) NONE, 1-2, 3-5 /HPF    Squamous Epithelial Cells, Urine 1-9 (SPARSE) Reference range not established. /HPF    Mucus, Urine 4+ Reference range not established. /LPF    Hyaline Casts, Urine 1+ (A) NONE /LPF   Troponin I, High Sensitivity   Result Value Ref Range    Troponin I, High Sensitivity 127 (HH) 0 - 20 ng/L     CT angio chest for pulmonary embolism    Result Date: 12/1/2024  STUDY: CT Angiogram of the Chest; 12/01/2024 4:00 pm INDICATION: Elevated D-Dimer, down for 4 days. COMPARISON: CT CAP 12/01/2024. ACCESSION NUMBER(S): [id number] ORDERING CLINICIAN: LINDA WINTERS TECHNIQUE:  CTA of the chest was performed with intravenous contrast.  Images are reviewed and processed at a workstation according to the CT angiogram protocol with 3-D and/or MIP post processing imaging generated.  Automated mA/kV exposure control was utilized and patient examination was performed in strict accordance with principles of ALARA. FINDINGS: Pulmonary arteries are adequately opacified and there is a single pulmonary embolism involving the right interlobar pulmonary artery extending into the anterobasilar segment.  The thoracic aorta is normal in course and caliber without dissection or aneurysm. Small pericardial effusion.  Severe coronary artery calcifications noted.  Thoracic lymph nodes are not enlarged. There is no pleural effusion, pleural thickening, or pneumothorax. The airways are patent. Emphysematous changes again noted.  Again noted are approximately 3 nodular lesions involving the left upper lobe, left lower lobe and right lower lobe unchanged from exam from the same day measuring up to approximately 2 cm involving the left upper lobe and left lower lobe Upper abdomen demonstrates no acute pathology. There are no acute fractures.  No suspicious bony lesions.    1. Single pulmonary embolism involving the right interlobar pulmonary artery extending into the anterobasilar segment.  Minimal embolic burden.  No evidence of right heart strain or pulmonary infarct. 2. Again noted are approximately 3 nodular lesions involving the left upper lobe, left lower lobe and right lower lobe unchanged from exam from the same day measuring up to approximately 2 cm involving the left upper lobe and left lower lobe.  Findings concerning for possible neoplasm.  Recommend further evaluation with PET/CT. 3. Emphysema. 4. Small pericardial effusion. 5. Severe coronary artery calcifications. Findings discussed with and acknowledged by Jelly Arana 4:28 PM Signed by Bong Nixon MD    XR tibia fibula bilateral 2 views    Result Date: 12/1/2024  STUDY: Bilateral Tibia and  Fibula Radiographs; 12/1/2024 2:00 PM INDICATION: Fall. COMPARISON: None Available. ACCESSION NUMBER(S): [id number] ORDERING CLINICIAN: ROSANA BRUCE TECHNIQUE:  Two view(s) of the right tibia/fibula (four images) and two view(s) of the left tibia/fibula (four images). FINDINGS:  RIGHT TIBIA AND FIBULA:  There is no displaced fracture.  The alignment is anatomic.  No soft tissue abnormality is seen. LEFT TIBIA AND FIBULA:  There is no displaced fracture.  The alignment is anatomic.  No soft tissue abnormality is seen.    No acute osseous abnormality. Signed by Lorne Victor MD    XR femur 2 VW bilateral    Result Date: 12/1/2024  STUDY: Bilateral Femur Radiographs; 12/1/2024 2:00 PM INDICATION: Fall. COMPARISON: None Available. ACCESSION NUMBER(S): [id number] ORDERING CLINICIAN: ROSANA BRUCE TECHNIQUE:  Two view(s) of the right femur (four images) and two view(s) of the left femur (four images). FINDINGS:  Mild generalized osseous demineralization is noted. Right:  There is no displaced fracture.  The alignment is anatomic. No soft tissue abnormality is seen. Left:  There is no displaced fracture.  The alignment is anatomic.  No soft tissue abnormality is seen. Excreted contrast is noted in the urinary bladder.  Peripheral vascular calcifications are seen bilaterally.    No definite acute osseous abnormality identified. Signed by Aiden Barrow    XR ribs 2 views left w chest pa or ap    Result Date: 12/1/2024  STUDY: Left Rib and Chest Radiographs INDICATION: Fall. COMPARISON: None Available. ACCESSION NUMBER(S): [id number] ORDERING CLINICIAN: ROSANA BRUCE TECHNIQUE:  Frontal chest and five view(s) of the left ribs. FINDINGS:  CARDIOMEDIASTINAL SILHOUETTE: Cardiomediastinal silhouette is normal in size and configuration. Calcified plaque is seen in the aorta.  LUNGS: Lungs are clear.  ABDOMEN: No remarkable upper abdominal findings.  Excreted contrast is seen in the collecting systems. LEFT RIBS:  There is  no acute rib fracture. OTHER VISUALIZED BONES: No acute osseous changes.  Cervical fusion hardware is partially visualized.  Mild levoconvex curvature is seen of the mid thoracic spine.    No definite acute cardiopulmonary disease. No definite acute left rib fracture identified. Signed by Aiden Barrow    XR pelvis 1-2 views    Result Date: 12/1/2024  STUDY: Pelvis Radiographs; 12/1/2024 2:00 PM INDICATION: Fall. COMPARISON: None Available. ACCESSION NUMBER(S): [id number] ORDERING CLINICIAN: ROSANA BRUCE TECHNIQUE:  One view(s) of the pelvis. FINDINGS:  The pelvic ring is intact.  There is no acute fracture.  Mild degenerative changes of both hips.    No acute osseous abnormality. Signed by Bong Nixon MD    CT chest abdomen pelvis w IV contrast    Result Date: 12/1/2024  STUDY: CT Chest, Abdomen, and Pelvis with IV Contrast, CT Thoracic Spine and Lumbar Spine without IV Contrast; 12/01/2024 1:20 PM INDICATION: Fall - on blood thinners. COMPARISON: CT cervical spine 10/12/2023. ACCESSION NUMBER(S): [id number], [id number], [id number] ORDERING CLINICIAN: LINDA WINTERS TECHNIQUE: CT of the chest, abdomen, and pelvis was performed.  Contiguous axial images were obtained at 3 mm slice thickness through the chest, abdomen, and pelvis.  Coronal and sagittal reconstructions at 3 mm slice thickness were performed.  Omnipaque 350 60 mL was administered intravenously.  Please note that spinal images were generated from the original CT abdomen and pelvis imaging. FINDINGS: CHEST: MEDIASTINUM: The heart is normal in size without pericardial effusion.  Aortic valvular and coronary artery calcifications are visualized.  Central vascular structures opacify normally.  LUNGS/PLEURA: There is no pleural effusion, pleural thickening, or pneumothorax. The airways are patent. The lungs demonstrate emphysematous changes bilaterally with a predominantly centrilobular distribution in an upper lobe predilection.  There is a  spiculated lesion located within the left lower lobe measuring 1.8 cm in diameter (204-156) .  An additional ill-defined nodular density is noted within the left upper lobe which measures 1.8 cm in diameter (204-160) .  There is an ill-defined groundglass nodular density located within the right lower lobe measuring 9 mm in diameter (204-188).  The graft there is biapical pleural-parenchymal scarring noted. LYMPH NODES: There is no evidence of significant thoracic lymphadenopathy by CT size criteria.. ABDOMEN:  LIVER: No hepatomegaly.  Smooth surface contour.  Normal attenuation.  There are subcentimeter hypoattenuating lesions noted within the liver. Largest measures 9 mm in diameter (201-102).  These are too small to characterize by size criteria.  There is an additional hypodense lesion located within left hepatic lobe measuring 1.4 cm in diameter (201-94) which likely represents a cyst or hemangioma.  BILE DUCTS: No intrahepatic or extrahepatic biliary ductal dilatation.  GALLBLADDER: The gallbladder is visualized.  Radiopaque calculi are noted. STOMACH: No abnormalities identified.  PANCREAS: No masses or ductal dilatation.  SPLEEN: There are calcifications noted within the spleen and liver which may represent sequelae of previous granulomatous disease.  ADRENAL GLANDS: No thickening or nodules.  KIDNEYS AND URETERS: Kidneys are normal in size and location.  No renal or ureteral calculi.  There are bilateral renal cysts noted.  The largest within the right kidney measures proximally 4.9 cm in diameter.  No hydronephrosis or nephrolithiasis.  PELVIS:  BLADDER: No abnormalities identified.  REPRODUCTIVE ORGANS: No abnormalities identified.  BOWEL: No abnormalities identified.  VESSELS: There are severe calcific atherosclerotic changes of the abdominal aorta and iliac vessels.  There is an infrarenal abdominal aortic aneurysm noted containing mural thrombus which measures up to 4.1 cm in diameter (201-138).  The  visceral vessels enhance normally.  The inferior mesenteric artery is not clearly visualized..  There are severe calcific atherosclerotic changes of the iliac vessels. Calcific atherosclerotic changes of the internal iliac arteries are noted bilaterally.  PERITONEUM/RETROPERITONEUM/LYMPH NODES: No free fluid.  No pneumoperitoneum. There is no evidence of significant abdominal or pelvic lymphadenopathy by CT size criteria.  ABDOMINAL WALL: There is colonic calculus is noted without CT evidence of diverticulitis.  The appendix is not clearly visualized.  There is no definite evidence of bowel wall thickening or dilatation to suggest mechanical obstruction.  SOFT TISSUES: There are infiltrative changes and ill-defined fluid noted overlying the subcutaneous soft tissues of the left posterior pelvis and hip (201-179) sees.  BONES: No acute fracture or aggressive osseous lesion.  Status post anterior fusion of the lower cervical spine.  THORACIC SPINE: The alignment is anatomic.  There is no fracture or traumatic subluxation. The vertebral body heights are well maintained.  There are multilevel degenerative changes of the mid to distal thoracic spine with anterior marginal spurring noted..  No significant central canal stenosis is demonstrated.  The neural foramina are patent throughout.  The paravertebral soft tissues are within normal limits. LUMBAR SPINE: The alignment is anatomic.  There is no fracture or traumatic subluxation. The vertebral body heights are well maintained.  There is multilevel degenerative disc disease of the lumbar spine with changes most prominently noted at the L1-L2 and L2-L3 levels with associated anterior osteophytosis.  No definite spondylolisthesis..  No significant central canal stenosis is demonstrated.  There is some mild bilateral neuroforamina narrowing noted at the L5-S1 level.  Mild left narrowing with associated facet hypertrophy may be noted at the L4-L5 level..  The paravertebral  soft tissues are within normal limits.    1.  No definite CT evidence of acute thoracic vascular injury. 2.  There is a solid spiculated lesion located within the left lower lobe which measures 1.8 cm in diameter.  This is suspicious for possible neoplasm.  Additional nodular densities are noted within the left upper lobe and right lower lobe as described above.  Recommend either a PET/CT for further evaluation or possible tissue sampling. 3.  No focal pulmonary consolidation, pleural effusions or pneumothorax. 4.  No definite CT evidence of abdominal or pelvic visceral/vascular injury. 5.  No intra-abdominal/pelvic fluid collections pneumoperitoneum. 6.  No acute thoracic or lumbar vertebral body compression/fracture. 7.  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 8. other findings as stated above. Signed by Terrence Borjas MD    CT thoracic spine wo IV contrast    Result Date: 12/1/2024  STUDY: CT Chest, Abdomen, and Pelvis with IV Contrast, CT Thoracic Spine and Lumbar Spine without IV Contrast; 12/01/2024 1:20 PM INDICATION: Fall - on blood thinners. COMPARISON: CT cervical spine 10/12/2023. ACCESSION NUMBER(S): [id number], [id number], [id number] ORDERING CLINICIAN: LINDA WINTERS TECHNIQUE: CT of the chest, abdomen, and pelvis was performed.  Contiguous axial images were obtained at 3 mm slice thickness through the chest, abdomen, and pelvis.  Coronal and sagittal reconstructions at 3 mm slice thickness were performed.  Omnipaque 350 60 mL was administered intravenously.  Please note that spinal images were generated from the original CT abdomen and pelvis imaging. FINDINGS: CHEST: MEDIASTINUM: The heart is normal in size without pericardial effusion.  Aortic valvular and coronary artery calcifications are visualized.  Central vascular structures opacify normally.  LUNGS/PLEURA: There is no pleural effusion, pleural thickening, or pneumothorax. The  airways are patent. The lungs demonstrate emphysematous changes bilaterally with a predominantly centrilobular distribution in an upper lobe predilection.  There is a spiculated lesion located within the left lower lobe measuring 1.8 cm in diameter (204-156) .  An additional ill-defined nodular density is noted within the left upper lobe which measures 1.8 cm in diameter (204-160) .  There is an ill-defined groundglass nodular density located within the right lower lobe measuring 9 mm in diameter (204-188).  The graft there is biapical pleural-parenchymal scarring noted. LYMPH NODES: There is no evidence of significant thoracic lymphadenopathy by CT size criteria.. ABDOMEN:  LIVER: No hepatomegaly.  Smooth surface contour.  Normal attenuation.  There are subcentimeter hypoattenuating lesions noted within the liver. Largest measures 9 mm in diameter (201-102).  These are too small to characterize by size criteria.  There is an additional hypodense lesion located within left hepatic lobe measuring 1.4 cm in diameter (201-94) which likely represents a cyst or hemangioma.  BILE DUCTS: No intrahepatic or extrahepatic biliary ductal dilatation.  GALLBLADDER: The gallbladder is visualized.  Radiopaque calculi are noted. STOMACH: No abnormalities identified.  PANCREAS: No masses or ductal dilatation.  SPLEEN: There are calcifications noted within the spleen and liver which may represent sequelae of previous granulomatous disease.  ADRENAL GLANDS: No thickening or nodules.  KIDNEYS AND URETERS: Kidneys are normal in size and location.  No renal or ureteral calculi.  There are bilateral renal cysts noted.  The largest within the right kidney measures proximally 4.9 cm in diameter.  No hydronephrosis or nephrolithiasis.  PELVIS:  BLADDER: No abnormalities identified.  REPRODUCTIVE ORGANS: No abnormalities identified.  BOWEL: No abnormalities identified.  VESSELS: There are severe calcific atherosclerotic changes of the  abdominal aorta and iliac vessels.  There is an infrarenal abdominal aortic aneurysm noted containing mural thrombus which measures up to 4.1 cm in diameter (201-138).  The visceral vessels enhance normally.  The inferior mesenteric artery is not clearly visualized..  There are severe calcific atherosclerotic changes of the iliac vessels. Calcific atherosclerotic changes of the internal iliac arteries are noted bilaterally.  PERITONEUM/RETROPERITONEUM/LYMPH NODES: No free fluid.  No pneumoperitoneum. There is no evidence of significant abdominal or pelvic lymphadenopathy by CT size criteria.  ABDOMINAL WALL: There is colonic calculus is noted without CT evidence of diverticulitis.  The appendix is not clearly visualized.  There is no definite evidence of bowel wall thickening or dilatation to suggest mechanical obstruction.  SOFT TISSUES: There are infiltrative changes and ill-defined fluid noted overlying the subcutaneous soft tissues of the left posterior pelvis and hip (201-179) sees.  BONES: No acute fracture or aggressive osseous lesion.  Status post anterior fusion of the lower cervical spine.  THORACIC SPINE: The alignment is anatomic.  There is no fracture or traumatic subluxation. The vertebral body heights are well maintained.  There are multilevel degenerative changes of the mid to distal thoracic spine with anterior marginal spurring noted..  No significant central canal stenosis is demonstrated.  The neural foramina are patent throughout.  The paravertebral soft tissues are within normal limits. LUMBAR SPINE: The alignment is anatomic.  There is no fracture or traumatic subluxation. The vertebral body heights are well maintained.  There is multilevel degenerative disc disease of the lumbar spine with changes most prominently noted at the L1-L2 and L2-L3 levels with associated anterior osteophytosis.  No definite spondylolisthesis..  No significant central canal stenosis is demonstrated.  There is some  mild bilateral neuroforamina narrowing noted at the L5-S1 level.  Mild left narrowing with associated facet hypertrophy may be noted at the L4-L5 level..  The paravertebral soft tissues are within normal limits.    1.  No definite CT evidence of acute thoracic vascular injury. 2.  There is a solid spiculated lesion located within the left lower lobe which measures 1.8 cm in diameter.  This is suspicious for possible neoplasm.  Additional nodular densities are noted within the left upper lobe and right lower lobe as described above.  Recommend either a PET/CT for further evaluation or possible tissue sampling. 3.  No focal pulmonary consolidation, pleural effusions or pneumothorax. 4.  No definite CT evidence of abdominal or pelvic visceral/vascular injury. 5.  No intra-abdominal/pelvic fluid collections pneumoperitoneum. 6.  No acute thoracic or lumbar vertebral body compression/fracture. 7.  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 8. other findings as stated above. Signed by Terrence Borjas MD    CT lumbar spine wo IV contrast    Result Date: 12/1/2024  STUDY: CT Chest, Abdomen, and Pelvis with IV Contrast, CT Thoracic Spine and Lumbar Spine without IV Contrast; 12/01/2024 1:20 PM INDICATION: Fall - on blood thinners. COMPARISON: CT cervical spine 10/12/2023. ACCESSION NUMBER(S): [id number], [id number], [id number] ORDERING CLINICIAN: LINDA WINTERS TECHNIQUE: CT of the chest, abdomen, and pelvis was performed.  Contiguous axial images were obtained at 3 mm slice thickness through the chest, abdomen, and pelvis.  Coronal and sagittal reconstructions at 3 mm slice thickness were performed.  Omnipaque 350 60 mL was administered intravenously.  Please note that spinal images were generated from the original CT abdomen and pelvis imaging. FINDINGS: CHEST: MEDIASTINUM: The heart is normal in size without pericardial effusion.  Aortic valvular and coronary  artery calcifications are visualized.  Central vascular structures opacify normally.  LUNGS/PLEURA: There is no pleural effusion, pleural thickening, or pneumothorax. The airways are patent. The lungs demonstrate emphysematous changes bilaterally with a predominantly centrilobular distribution in an upper lobe predilection.  There is a spiculated lesion located within the left lower lobe measuring 1.8 cm in diameter (204-156) .  An additional ill-defined nodular density is noted within the left upper lobe which measures 1.8 cm in diameter (204-160) .  There is an ill-defined groundglass nodular density located within the right lower lobe measuring 9 mm in diameter (204-188).  The graft there is biapical pleural-parenchymal scarring noted. LYMPH NODES: There is no evidence of significant thoracic lymphadenopathy by CT size criteria.. ABDOMEN:  LIVER: No hepatomegaly.  Smooth surface contour.  Normal attenuation.  There are subcentimeter hypoattenuating lesions noted within the liver. Largest measures 9 mm in diameter (201-102).  These are too small to characterize by size criteria.  There is an additional hypodense lesion located within left hepatic lobe measuring 1.4 cm in diameter (201-94) which likely represents a cyst or hemangioma.  BILE DUCTS: No intrahepatic or extrahepatic biliary ductal dilatation.  GALLBLADDER: The gallbladder is visualized.  Radiopaque calculi are noted. STOMACH: No abnormalities identified.  PANCREAS: No masses or ductal dilatation.  SPLEEN: There are calcifications noted within the spleen and liver which may represent sequelae of previous granulomatous disease.  ADRENAL GLANDS: No thickening or nodules.  KIDNEYS AND URETERS: Kidneys are normal in size and location.  No renal or ureteral calculi.  There are bilateral renal cysts noted.  The largest within the right kidney measures proximally 4.9 cm in diameter.  No hydronephrosis or nephrolithiasis.  PELVIS:  BLADDER: No abnormalities  identified.  REPRODUCTIVE ORGANS: No abnormalities identified.  BOWEL: No abnormalities identified.  VESSELS: There are severe calcific atherosclerotic changes of the abdominal aorta and iliac vessels.  There is an infrarenal abdominal aortic aneurysm noted containing mural thrombus which measures up to 4.1 cm in diameter (201-138).  The visceral vessels enhance normally.  The inferior mesenteric artery is not clearly visualized..  There are severe calcific atherosclerotic changes of the iliac vessels. Calcific atherosclerotic changes of the internal iliac arteries are noted bilaterally.  PERITONEUM/RETROPERITONEUM/LYMPH NODES: No free fluid.  No pneumoperitoneum. There is no evidence of significant abdominal or pelvic lymphadenopathy by CT size criteria.  ABDOMINAL WALL: There is colonic calculus is noted without CT evidence of diverticulitis.  The appendix is not clearly visualized.  There is no definite evidence of bowel wall thickening or dilatation to suggest mechanical obstruction.  SOFT TISSUES: There are infiltrative changes and ill-defined fluid noted overlying the subcutaneous soft tissues of the left posterior pelvis and hip (201-179) sees.  BONES: No acute fracture or aggressive osseous lesion.  Status post anterior fusion of the lower cervical spine.  THORACIC SPINE: The alignment is anatomic.  There is no fracture or traumatic subluxation. The vertebral body heights are well maintained.  There are multilevel degenerative changes of the mid to distal thoracic spine with anterior marginal spurring noted..  No significant central canal stenosis is demonstrated.  The neural foramina are patent throughout.  The paravertebral soft tissues are within normal limits. LUMBAR SPINE: The alignment is anatomic.  There is no fracture or traumatic subluxation. The vertebral body heights are well maintained.  There is multilevel degenerative disc disease of the lumbar spine with changes most prominently noted at the  L1-L2 and L2-L3 levels with associated anterior osteophytosis.  No definite spondylolisthesis..  No significant central canal stenosis is demonstrated.  There is some mild bilateral neuroforamina narrowing noted at the L5-S1 level.  Mild left narrowing with associated facet hypertrophy may be noted at the L4-L5 level..  The paravertebral soft tissues are within normal limits.    1.  No definite CT evidence of acute thoracic vascular injury. 2.  There is a solid spiculated lesion located within the left lower lobe which measures 1.8 cm in diameter.  This is suspicious for possible neoplasm.  Additional nodular densities are noted within the left upper lobe and right lower lobe as described above.  Recommend either a PET/CT for further evaluation or possible tissue sampling. 3.  No focal pulmonary consolidation, pleural effusions or pneumothorax. 4.  No definite CT evidence of abdominal or pelvic visceral/vascular injury. 5.  No intra-abdominal/pelvic fluid collections pneumoperitoneum. 6.  No acute thoracic or lumbar vertebral body compression/fracture. 7.  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 8. other findings as stated above. Signed by Terrence Borjas MD    CT head wo IV contrast    Result Date: 12/1/2024  Interpreted By:  Hai Kruger, STUDY: CT HEAD WO IV CONTRAST; ;  12/1/2024 1:10 pm   INDICATION: Signs/Symptoms:fall on thinners.   COMPARISON: 10/12/2023   ACCESSION NUMBER(S): [id number]   ORDERING CLINICIAN: LINDA WINTERS   TECHNIQUE: Contiguous unenhanced axial CT sections are performed from the skull base to the vertex.   The study is limited by motion degradation.   FINDINGS: There is mucoperiosteal thickening of the ethmoid air cells and maxillary sinuses. A fluid level is identified in the left frontal compartment and small fluid level in the left maxillary sinus. The mastoid air cells are clear. There is right periorbital hematoma  and hematoma overlying the frontal calvarium at the midline and to the left of midline. There is some soft tissue swelling also noted over the posterior parietal calvarium. There is no sign of depressed calvarial fracture. The visualized osseous structures are intact.   There is moderate to severe generalized parenchymal volume loss with enlargement of the cortical sulci and CSF spaces similar to the previous study. There is diffuse hypoattenuation in the cerebral white matter bilaterally compatible with small-vessel ischemia in greater on the right. These findings are also similar to the previous study. 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 These findings are also unchanged. Old infarct is also suspected in the posterior right cerebellum which is stable as well.   There is no sign of parenchymal hematoma or dense extra-axial fluid collection. There is no mass effect or midline shift. The gray matter/white matter distribution is preserved.       Soft tissue hematomas are identified overlying the right orbit, maxilla, and calvarium as described above. There is no acute fracture.   Severe age-related atrophy and small-vessel ischemic changes of the cerebral white matter.   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 These findings have remained unchanged.   No CT evidence of acute intracranial hemorrhage or mass effect.   Mucoperiosteal thickening of the ethmoid and maxillary sinuses. There is fluid level in the left frontal compartment and left maxillary sinus which could reflect acute inflammatory changes. If there is clinical concern for occult facial fracture, CT examination can be performed.     MACRO: None   Signed by: Hai Kruger 12/1/2024 1:29 PM Dictation workstation:   [id number]    CT  cervical spine wo IV contrast    Result Date: 12/1/2024  Interpreted By:  Hai Kruger, STUDY: CT CERVICAL SPINE WO IV CONTRAST; ;  12/1/2024 1:10 pm   INDICATION: Signs/Symptoms:fall on thinners.   COMPARISON: 10/12/2023   ACCESSION NUMBER(S): [id number]   ORDERING CLINICIAN: LINDA WINTERS   TECHNIQUE: Contiguous axial CT sections are performed from the skullbase to the upper thoracic spine and supplemented with coronal and sagittal reformatted images.   FINDINGS: The patient is status post anterior cervical fusion from C4 through C7 with plate and screw fixation. The hardware is intact. There is no lucency surrounding the hardware. The appearance is stable from 10/12/2023.   There is mild levo convexity of the lumbar spine and straightening of the lumbar lordosis. The facet joints align normally.   There is cervical spondylosis with disc space narrowing at C7-T1 and to a lesser extent C3-4. This appearance is also stable.   The cervical vertebral body heights are maintained. The C1 ring is intact. There is no sign of cervical vertebral fracture. There is no bone destruction or aggressive periosteal reaction. No lytic or blastic lesion is detected.   There is multiple bulging disc and marginal osteophyte with multilevel central canal narrowing which is greatest at C5-6 and C6-7. This appearance is also unchanged. There is bilateral multilevel neural foraminal narrowing secondary to facet and uncovertebral arthrosis which is greatest at C3-4 on the right. Multilevel hypertrophic facet arthrosis is most pronounced at C2-3 on the left at C3-4 on the right.   The surrounding soft tissues demonstrate biapical pleural and parenchymal scarring with emphysematous changes. There is no prevertebral soft tissue swelling or retropharyngeal air.       Status post multilevel anterior cervical fusion from C4 through C7. There is no hardware failure.   No acute fracture or subluxation.   Multilevel degenerative changes  with central canal and neural foraminal narrowing as detailed above, stable from 10/12/2023.     MACRO: None   Signed by: Hai Kruger 12/1/2024 1:22 PM Dictation workstation:   [id number]      Assessment/Plan   Admit for PE and heparin infusion  PT/OT eval  SW/CM consult  Heparin gtt  Encourage oral intake   Telemetry  Strict I&O  Daily weights  Bedrest  Continuous telemetry monitoring  Incentive spirometry every hour while awake  Bowel regimen  Tylenol for pain  Zofran as needed for nausea  Continue with close neurovascular monitoring  Trend labs  Resume home inhalers  Assessment & Plan  PE (pulmonary thromboembolism) (Multi)  Currently on Heparin infusion  Supplemental oxygen, goal O2 sat greater than 90%    HTN (hypertension)  Home meds Plavix, Coreg, and Lipitor     Elevated HDL  Home Lipitor  Heart Healthy diet when cleared to take p.o.    Incidental pulmonary nodule  Again noted are approximately 3 nodular lesions involving the left upper lobe, left lower lobe and right lower lobe unchanged from exam from the same day measuring up to approximately 2 cm involving the left upper lobe and left lower lobe.  Findings concerning for possible neoplasm.  Recommend further evaluation with PET/CT.     Will require outpatient follow up   Chronic hypoxic respiratory failure, on home oxygen therapy (Multi)  Home oxygen PRN 2 L NC   Resume Home inhalers  Monitor oxygen requirements          ORTEGA Juarez-CNP

## 2024-12-01 NOTE — ED NOTES
1159 Limited trauma one push activation by JS Baez responded 1202     Payal Padilla, EMT  12/01/24 0250

## 2024-12-02 ENCOUNTER — APPOINTMENT (OUTPATIENT)
Dept: RADIOLOGY | Facility: HOSPITAL | Age: 84
End: 2024-12-02
Payer: MEDICARE

## 2024-12-02 ENCOUNTER — APPOINTMENT (OUTPATIENT)
Dept: CARDIOLOGY | Facility: HOSPITAL | Age: 84
End: 2024-12-02
Payer: MEDICARE

## 2024-12-02 VITALS
OXYGEN SATURATION: 100 % | HEART RATE: 107 BPM | TEMPERATURE: 98.2 F | DIASTOLIC BLOOD PRESSURE: 62 MMHG | BODY MASS INDEX: 16.05 KG/M2 | WEIGHT: 112.1 LBS | HEIGHT: 70 IN | SYSTOLIC BLOOD PRESSURE: 96 MMHG | RESPIRATION RATE: 16 BRPM

## 2024-12-02 PROBLEM — G93.41 METABOLIC ENCEPHALOPATHY: Status: ACTIVE | Noted: 2024-12-02

## 2024-12-02 PROBLEM — D72.829 LEUKOCYTOSIS: Status: ACTIVE | Noted: 2024-12-02

## 2024-12-02 PROBLEM — R79.89 ELEVATED TROPONIN: Status: ACTIVE | Noted: 2024-12-02

## 2024-12-02 PROBLEM — R79.89 ELEVATED BRAIN NATRIURETIC PEPTIDE (BNP) LEVEL: Status: ACTIVE | Noted: 2024-12-02

## 2024-12-02 PROBLEM — M62.82 RHABDOMYOLYSIS: Status: ACTIVE | Noted: 2024-12-02

## 2024-12-02 LAB
ALBUMIN SERPL BCP-MCNC: 2.8 G/DL (ref 3.4–5)
ALP SERPL-CCNC: 48 U/L (ref 33–136)
ALT SERPL W P-5'-P-CCNC: 32 U/L (ref 10–52)
AMMONIA PLAS-SCNC: 43 UMOL/L (ref 16–53)
ANION GAP BLDA CALCULATED.4IONS-SCNC: 5 MMO/L (ref 10–25)
ANION GAP SERPL CALC-SCNC: 14 MMOL/L (ref 10–20)
ANION GAP SERPL CALC-SCNC: 16 MMOL/L (ref 10–20)
APPARATUS: ABNORMAL
AST SERPL W P-5'-P-CCNC: 62 U/L (ref 9–39)
ATRIAL RATE: 120 BPM
BASE EXCESS BLDA CALC-SCNC: 7.1 MMOL/L (ref -2–3)
BILIRUB SERPL-MCNC: 1.1 MG/DL (ref 0–1.2)
BNP SERPL-MCNC: 906 PG/ML (ref 0–99)
BODY TEMPERATURE: ABNORMAL
BUN SERPL-MCNC: 51 MG/DL (ref 6–23)
BUN SERPL-MCNC: 53 MG/DL (ref 6–23)
CA-I BLDA-SCNC: 1.25 MMOL/L (ref 1.1–1.33)
CALCIUM SERPL-MCNC: 8.3 MG/DL (ref 8.6–10.3)
CALCIUM SERPL-MCNC: 8.7 MG/DL (ref 8.6–10.3)
CARDIAC TROPONIN I PNL SERPL HS: 84 NG/L (ref 0–20)
CHLORIDE BLDA-SCNC: 109 MMOL/L (ref 98–107)
CHLORIDE SERPL-SCNC: 106 MMOL/L (ref 98–107)
CHLORIDE SERPL-SCNC: 107 MMOL/L (ref 98–107)
CK SERPL-CCNC: 1533 U/L (ref 0–325)
CO2 SERPL-SCNC: 28 MMOL/L (ref 21–32)
CO2 SERPL-SCNC: 29 MMOL/L (ref 21–32)
CREAT SERPL-MCNC: 0.74 MG/DL (ref 0.5–1.3)
CREAT SERPL-MCNC: 0.76 MG/DL (ref 0.5–1.3)
EGFRCR SERPLBLD CKD-EPI 2021: 90 ML/MIN/1.73M*2
EGFRCR SERPLBLD CKD-EPI 2021: 90 ML/MIN/1.73M*2
EJECTION FRACTION APICAL 4 CHAMBER: 59.1
EJECTION FRACTION: 70 %
ERYTHROCYTE [DISTWIDTH] IN BLOOD BY AUTOMATED COUNT: 14 % (ref 11.5–14.5)
FLUAV RNA RESP QL NAA+PROBE: NOT DETECTED
FLUBV RNA RESP QL NAA+PROBE: NOT DETECTED
GLUCOSE BLDA-MCNC: 108 MG/DL (ref 74–99)
GLUCOSE SERPL-MCNC: 83 MG/DL (ref 74–99)
GLUCOSE SERPL-MCNC: 86 MG/DL (ref 74–99)
HCO3 BLDA-SCNC: 32 MMOL/L (ref 22–26)
HCT VFR BLD AUTO: 45.4 % (ref 41–52)
HCT VFR BLD EST: 41 % (ref 41–52)
HGB BLD-MCNC: 13.7 G/DL (ref 13.5–17.5)
HGB BLDA-MCNC: 13.6 G/DL (ref 13.5–17.5)
HOLD SPECIMEN: NORMAL
INHALED O2 CONCENTRATION: 33 %
LACTATE BLDA-SCNC: 1.4 MMOL/L (ref 0.4–2)
LACTATE SERPL-SCNC: 2.1 MMOL/L (ref 0.4–2)
LACTATE SERPL-SCNC: 2.6 MMOL/L (ref 0.4–2)
LACTATE SERPL-SCNC: 4.8 MMOL/L (ref 0.4–2)
MAGNESIUM SERPL-MCNC: 2.21 MG/DL (ref 1.6–2.4)
MCH RBC QN AUTO: 30 PG (ref 26–34)
MCHC RBC AUTO-ENTMCNC: 30.2 G/DL (ref 32–36)
MCV RBC AUTO: 99 FL (ref 80–100)
NRBC BLD-RTO: 0 /100 WBCS (ref 0–0)
OXYHGB MFR BLDA: 97.2 % (ref 94–98)
P AXIS: 83 DEGREES
P OFFSET: 191 MS
P ONSET: 141 MS
PCO2 BLDA: 45 MM HG (ref 38–42)
PH BLDA: 7.46 PH (ref 7.38–7.42)
PLATELET # BLD AUTO: 155 X10*3/UL (ref 150–450)
PO2 BLDA: 245 MM HG (ref 85–95)
POTASSIUM BLDA-SCNC: 3.8 MMOL/L (ref 3.5–5.3)
POTASSIUM SERPL-SCNC: 3.8 MMOL/L (ref 3.5–5.3)
POTASSIUM SERPL-SCNC: 4.1 MMOL/L (ref 3.5–5.3)
PR INTERVAL: 148 MS
PROCALCITONIN SERPL-MCNC: 0.17 NG/ML
PROT SERPL-MCNC: 5.1 G/DL (ref 6.4–8.2)
Q ONSET: 215 MS
QRS COUNT: 19 BEATS
QRS DURATION: 140 MS
QT INTERVAL: 340 MS
QTC CALCULATION(BAZETT): 480 MS
QTC FREDERICIA: 428 MS
R AXIS: -87 DEGREES
RBC # BLD AUTO: 4.57 X10*6/UL (ref 4.5–5.9)
RIGHT VENTRICLE PEAK SYSTOLIC PRESSURE: 31.8 MMHG
SAO2 % BLDA: 100 % (ref 94–100)
SARS-COV-2 RNA RESP QL NAA+PROBE: NOT DETECTED
SODIUM BLDA-SCNC: 142 MMOL/L (ref 136–145)
SODIUM SERPL-SCNC: 146 MMOL/L (ref 136–145)
SODIUM SERPL-SCNC: 146 MMOL/L (ref 136–145)
T AXIS: 79 DEGREES
T OFFSET: 385 MS
UFH PPP CHRO-ACNC: 0.2 IU/ML
UFH PPP CHRO-ACNC: 0.3 IU/ML
UFH PPP CHRO-ACNC: 0.3 IU/ML
UFH PPP CHRO-ACNC: 0.4 IU/ML
UFH PPP CHRO-ACNC: <0.1 IU/ML
VENTRICULAR RATE: 120 BPM
WBC # BLD AUTO: 18.5 X10*3/UL (ref 4.4–11.3)

## 2024-12-02 PROCEDURE — 92610 EVALUATE SWALLOWING FUNCTION: CPT | Mod: GN | Performed by: SPEECH-LANGUAGE PATHOLOGIST

## 2024-12-02 PROCEDURE — 2500000001 HC RX 250 WO HCPCS SELF ADMINISTERED DRUGS (ALT 637 FOR MEDICARE OP): Performed by: NURSE PRACTITIONER

## 2024-12-02 PROCEDURE — 83735 ASSAY OF MAGNESIUM: CPT | Performed by: NURSE PRACTITIONER

## 2024-12-02 PROCEDURE — 94640 AIRWAY INHALATION TREATMENT: CPT

## 2024-12-02 PROCEDURE — 2060000001 HC INTERMEDIATE ICU ROOM DAILY

## 2024-12-02 PROCEDURE — 85520 HEPARIN ASSAY: CPT | Performed by: INTERNAL MEDICINE

## 2024-12-02 PROCEDURE — 87636 SARSCOV2 & INF A&B AMP PRB: CPT | Performed by: NURSE PRACTITIONER

## 2024-12-02 PROCEDURE — 2500000005 HC RX 250 GENERAL PHARMACY W/O HCPCS: Performed by: INTERNAL MEDICINE

## 2024-12-02 PROCEDURE — 82550 ASSAY OF CK (CPK): CPT

## 2024-12-02 PROCEDURE — 84132 ASSAY OF SERUM POTASSIUM: CPT | Performed by: NURSE PRACTITIONER

## 2024-12-02 PROCEDURE — 36415 COLL VENOUS BLD VENIPUNCTURE: CPT | Performed by: INTERNAL MEDICINE

## 2024-12-02 PROCEDURE — 2500000004 HC RX 250 GENERAL PHARMACY W/ HCPCS (ALT 636 FOR OP/ED): Performed by: NURSE PRACTITIONER

## 2024-12-02 PROCEDURE — 70450 CT HEAD/BRAIN W/O DYE: CPT | Performed by: RADIOLOGY

## 2024-12-02 PROCEDURE — 2500000002 HC RX 250 W HCPCS SELF ADMINISTERED DRUGS (ALT 637 FOR MEDICARE OP, ALT 636 FOR OP/ED): Performed by: NURSE PRACTITIONER

## 2024-12-02 PROCEDURE — 2500000004 HC RX 250 GENERAL PHARMACY W/ HCPCS (ALT 636 FOR OP/ED)

## 2024-12-02 PROCEDURE — 99222 1ST HOSP IP/OBS MODERATE 55: CPT | Performed by: PSYCHIATRY & NEUROLOGY

## 2024-12-02 PROCEDURE — 83605 ASSAY OF LACTIC ACID: CPT | Performed by: NURSE PRACTITIONER

## 2024-12-02 PROCEDURE — 84132 ASSAY OF SERUM POTASSIUM: CPT | Performed by: INTERNAL MEDICINE

## 2024-12-02 PROCEDURE — 85520 HEPARIN ASSAY: CPT | Performed by: PSYCHIATRY & NEUROLOGY

## 2024-12-02 PROCEDURE — 84145 PROCALCITONIN (PCT): CPT | Mod: STJLAB

## 2024-12-02 PROCEDURE — 93306 TTE W/DOPPLER COMPLETE: CPT | Performed by: INTERNAL MEDICINE

## 2024-12-02 PROCEDURE — 93306 TTE W/DOPPLER COMPLETE: CPT

## 2024-12-02 PROCEDURE — 2500000002 HC RX 250 W HCPCS SELF ADMINISTERED DRUGS (ALT 637 FOR MEDICARE OP, ALT 636 FOR OP/ED): Performed by: INTERNAL MEDICINE

## 2024-12-02 PROCEDURE — 85520 HEPARIN ASSAY: CPT | Performed by: NURSE PRACTITIONER

## 2024-12-02 PROCEDURE — 82140 ASSAY OF AMMONIA: CPT | Performed by: NURSE PRACTITIONER

## 2024-12-02 PROCEDURE — 92526 ORAL FUNCTION THERAPY: CPT | Mod: GN | Performed by: SPEECH-LANGUAGE PATHOLOGIST

## 2024-12-02 PROCEDURE — 84484 ASSAY OF TROPONIN QUANT: CPT | Performed by: NURSE PRACTITIONER

## 2024-12-02 PROCEDURE — 80053 COMPREHEN METABOLIC PANEL: CPT | Performed by: INTERNAL MEDICINE

## 2024-12-02 PROCEDURE — 85027 COMPLETE CBC AUTOMATED: CPT | Performed by: INTERNAL MEDICINE

## 2024-12-02 PROCEDURE — 83880 ASSAY OF NATRIURETIC PEPTIDE: CPT | Performed by: NURSE PRACTITIONER

## 2024-12-02 PROCEDURE — 70450 CT HEAD/BRAIN W/O DYE: CPT

## 2024-12-02 PROCEDURE — 99223 1ST HOSP IP/OBS HIGH 75: CPT | Performed by: INTERNAL MEDICINE

## 2024-12-02 RX ORDER — CARVEDILOL 3.12 MG/1
3.12 TABLET ORAL 2 TIMES DAILY
Status: DISCONTINUED | OUTPATIENT
Start: 2024-12-02 | End: 2024-12-03

## 2024-12-02 RX ORDER — IPRATROPIUM BROMIDE 0.5 MG/2.5ML
0.5 SOLUTION RESPIRATORY (INHALATION)
Status: DISCONTINUED | OUTPATIENT
Start: 2024-12-03 | End: 2024-12-06 | Stop reason: HOSPADM

## 2024-12-02 RX ORDER — SODIUM CHLORIDE, SODIUM LACTATE, POTASSIUM CHLORIDE, CALCIUM CHLORIDE 600; 310; 30; 20 MG/100ML; MG/100ML; MG/100ML; MG/100ML
100 INJECTION, SOLUTION INTRAVENOUS CONTINUOUS
Status: ACTIVE | OUTPATIENT
Start: 2024-12-02 | End: 2024-12-02

## 2024-12-02 RX ADMIN — Medication 5 L/MIN: at 20:36

## 2024-12-02 RX ADMIN — BUDESONIDE 0.5 MG: 0.5 INHALANT RESPIRATORY (INHALATION) at 20:35

## 2024-12-02 RX ADMIN — PIPERACILLIN SODIUM AND TAZOBACTAM SODIUM 3.38 G: 3; .375 INJECTION, SOLUTION INTRAVENOUS at 22:52

## 2024-12-02 RX ADMIN — SODIUM CHLORIDE 500 ML: 9 INJECTION, SOLUTION INTRAVENOUS at 15:00

## 2024-12-02 RX ADMIN — IPRATROPIUM BROMIDE 0.5 MG: 0.5 SOLUTION RESPIRATORY (INHALATION) at 01:04

## 2024-12-02 RX ADMIN — SENNOSIDES AND DOCUSATE SODIUM 2 TABLET: 50; 8.6 TABLET ORAL at 22:02

## 2024-12-02 RX ADMIN — HEPARIN SODIUM 800 UNITS/HR: 10000 INJECTION, SOLUTION INTRAVENOUS at 14:38

## 2024-12-02 RX ADMIN — FORMOTEROL FUMARATE 20 MCG: 20 SOLUTION RESPIRATORY (INHALATION) at 08:04

## 2024-12-02 RX ADMIN — IPRATROPIUM BROMIDE 0.5 MG: 0.5 SOLUTION RESPIRATORY (INHALATION) at 20:35

## 2024-12-02 RX ADMIN — PIPERACILLIN SODIUM AND TAZOBACTAM SODIUM 3.38 G: 3; .375 INJECTION, SOLUTION INTRAVENOUS at 14:43

## 2024-12-02 RX ADMIN — SODIUM CHLORIDE, POTASSIUM CHLORIDE, SODIUM LACTATE AND CALCIUM CHLORIDE 100 ML/HR: 600; 310; 30; 20 INJECTION, SOLUTION INTRAVENOUS at 10:36

## 2024-12-02 RX ADMIN — TAMSULOSIN HYDROCHLORIDE 0.4 MG: 0.4 CAPSULE ORAL at 22:01

## 2024-12-02 RX ADMIN — HEPARIN SODIUM 2000 UNITS: 5000 INJECTION INTRAVENOUS; SUBCUTANEOUS at 18:56

## 2024-12-02 RX ADMIN — SODIUM CHLORIDE 1000 ML: 9 INJECTION, SOLUTION INTRAVENOUS at 20:55

## 2024-12-02 RX ADMIN — PIPERACILLIN SODIUM AND TAZOBACTAM SODIUM 3.38 G: 3; .375 INJECTION, SOLUTION INTRAVENOUS at 07:09

## 2024-12-02 RX ADMIN — CARVEDILOL 3.12 MG: 3.12 TABLET, FILM COATED ORAL at 22:02

## 2024-12-02 RX ADMIN — IPRATROPIUM BROMIDE 0.5 MG: 0.5 SOLUTION RESPIRATORY (INHALATION) at 08:04

## 2024-12-02 RX ADMIN — FORMOTEROL FUMARATE 20 MCG: 20 SOLUTION RESPIRATORY (INHALATION) at 20:35

## 2024-12-02 RX ADMIN — BUDESONIDE 0.5 MG: 0.5 INHALANT RESPIRATORY (INHALATION) at 08:03

## 2024-12-02 SDOH — ECONOMIC STABILITY: INCOME INSECURITY: IN THE PAST 12 MONTHS HAS THE ELECTRIC, GAS, OIL, OR WATER COMPANY THREATENED TO SHUT OFF SERVICES IN YOUR HOME?: NO

## 2024-12-02 SDOH — SOCIAL STABILITY: SOCIAL INSECURITY
WITHIN THE LAST YEAR, HAVE YOU BEEN KICKED, HIT, SLAPPED, OR OTHERWISE PHYSICALLY HURT BY YOUR PARTNER OR EX-PARTNER?: NO

## 2024-12-02 SDOH — ECONOMIC STABILITY: HOUSING INSECURITY: AT ANY TIME IN THE PAST 12 MONTHS, WERE YOU HOMELESS OR LIVING IN A SHELTER (INCLUDING NOW)?: NO

## 2024-12-02 SDOH — SOCIAL STABILITY: SOCIAL INSECURITY: WITHIN THE LAST YEAR, HAVE YOU BEEN HUMILIATED OR EMOTIONALLY ABUSED IN OTHER WAYS BY YOUR PARTNER OR EX-PARTNER?: NO

## 2024-12-02 SDOH — SOCIAL STABILITY: SOCIAL INSECURITY: WITHIN THE LAST YEAR, HAVE YOU BEEN AFRAID OF YOUR PARTNER OR EX-PARTNER?: NO

## 2024-12-02 SDOH — SOCIAL STABILITY: SOCIAL INSECURITY: DOES ANYONE TRY TO KEEP YOU FROM HAVING/CONTACTING OTHER FRIENDS OR DOING THINGS OUTSIDE YOUR HOME?: NO

## 2024-12-02 SDOH — ECONOMIC STABILITY: HOUSING INSECURITY: IN THE LAST 12 MONTHS, WAS THERE A TIME WHEN YOU WERE NOT ABLE TO PAY THE MORTGAGE OR RENT ON TIME?: NO

## 2024-12-02 SDOH — SOCIAL STABILITY: SOCIAL INSECURITY: ABUSE: ADULT

## 2024-12-02 SDOH — SOCIAL STABILITY: SOCIAL INSECURITY: WERE YOU ABLE TO COMPLETE ALL THE BEHAVIORAL HEALTH SCREENINGS?: YES

## 2024-12-02 SDOH — ECONOMIC STABILITY: FOOD INSECURITY: WITHIN THE PAST 12 MONTHS, YOU WORRIED THAT YOUR FOOD WOULD RUN OUT BEFORE YOU GOT THE MONEY TO BUY MORE.: NEVER TRUE

## 2024-12-02 SDOH — SOCIAL STABILITY: SOCIAL INSECURITY: DO YOU FEEL UNSAFE GOING BACK TO THE PLACE WHERE YOU ARE LIVING?: NO

## 2024-12-02 SDOH — SOCIAL STABILITY: SOCIAL INSECURITY: ARE YOU OR HAVE YOU BEEN THREATENED OR ABUSED PHYSICALLY, EMOTIONALLY, OR SEXUALLY BY ANYONE?: NO

## 2024-12-02 SDOH — ECONOMIC STABILITY: FOOD INSECURITY: HOW HARD IS IT FOR YOU TO PAY FOR THE VERY BASICS LIKE FOOD, HOUSING, MEDICAL CARE, AND HEATING?: NOT HARD AT ALL

## 2024-12-02 SDOH — ECONOMIC STABILITY: HOUSING INSECURITY: IN THE PAST 12 MONTHS, HOW MANY TIMES HAVE YOU MOVED WHERE YOU WERE LIVING?: 0

## 2024-12-02 SDOH — ECONOMIC STABILITY: FOOD INSECURITY: WITHIN THE PAST 12 MONTHS, THE FOOD YOU BOUGHT JUST DIDN'T LAST AND YOU DIDN'T HAVE MONEY TO GET MORE.: NEVER TRUE

## 2024-12-02 SDOH — ECONOMIC STABILITY: TRANSPORTATION INSECURITY: IN THE PAST 12 MONTHS, HAS LACK OF TRANSPORTATION KEPT YOU FROM MEDICAL APPOINTMENTS OR FROM GETTING MEDICATIONS?: NO

## 2024-12-02 SDOH — SOCIAL STABILITY: SOCIAL INSECURITY: DO YOU FEEL ANYONE HAS EXPLOITED OR TAKEN ADVANTAGE OF YOU FINANCIALLY OR OF YOUR PERSONAL PROPERTY?: NO

## 2024-12-02 SDOH — SOCIAL STABILITY: SOCIAL INSECURITY: HAVE YOU HAD ANY THOUGHTS OF HARMING ANYONE ELSE?: NO

## 2024-12-02 SDOH — SOCIAL STABILITY: SOCIAL INSECURITY
WITHIN THE LAST YEAR, HAVE YOU BEEN RAPED OR FORCED TO HAVE ANY KIND OF SEXUAL ACTIVITY BY YOUR PARTNER OR EX-PARTNER?: NO

## 2024-12-02 SDOH — SOCIAL STABILITY: SOCIAL INSECURITY: HAVE YOU HAD THOUGHTS OF HARMING ANYONE ELSE?: NO

## 2024-12-02 SDOH — SOCIAL STABILITY: SOCIAL INSECURITY: ARE THERE ANY APPARENT SIGNS OF INJURIES/BEHAVIORS THAT COULD BE RELATED TO ABUSE/NEGLECT?: NO

## 2024-12-02 SDOH — SOCIAL STABILITY: SOCIAL INSECURITY: HAS ANYONE EVER THREATENED TO HURT YOUR FAMILY OR YOUR PETS?: NO

## 2024-12-02 ASSESSMENT — COGNITIVE AND FUNCTIONAL STATUS - GENERAL
WALKING IN HOSPITAL ROOM: A LOT
EATING MEALS: A LITTLE
STANDING UP FROM CHAIR USING ARMS: A LITTLE
DRESSING REGULAR LOWER BODY CLOTHING: A LOT
MOBILITY SCORE: 18
DRESSING REGULAR LOWER BODY CLOTHING: A LITTLE
PATIENT BASELINE BEDBOUND: NO
CLIMB 3 TO 5 STEPS WITH RAILING: A LOT
WALKING IN HOSPITAL ROOM: A LITTLE
PERSONAL GROOMING: A LOT
DAILY ACTIVITIY SCORE: 12
EATING MEALS: A LOT
TOILETING: A LITTLE
MOVING FROM LYING ON BACK TO SITTING ON SIDE OF FLAT BED WITH BEDRAILS: A LITTLE
TURNING FROM BACK TO SIDE WHILE IN FLAT BAD: A LITTLE
MOVING TO AND FROM BED TO CHAIR: A LITTLE
HELP NEEDED FOR BATHING: A LITTLE
DRESSING REGULAR UPPER BODY CLOTHING: A LITTLE
DAILY ACTIVITIY SCORE: 18
MOBILITY SCORE: 12
MOVING FROM LYING ON BACK TO SITTING ON SIDE OF FLAT BED WITH BEDRAILS: A LOT
TOILETING: A LOT
PERSONAL GROOMING: A LITTLE
MOVING TO AND FROM BED TO CHAIR: A LOT
STANDING UP FROM CHAIR USING ARMS: A LOT
TURNING FROM BACK TO SIDE WHILE IN FLAT BAD: A LOT
DRESSING REGULAR UPPER BODY CLOTHING: A LOT
HELP NEEDED FOR BATHING: A LOT
CLIMB 3 TO 5 STEPS WITH RAILING: A LITTLE

## 2024-12-02 ASSESSMENT — ACTIVITIES OF DAILY LIVING (ADL)
TOILETING: NEEDS ASSISTANCE
WALKS IN HOME: NEEDS ASSISTANCE
DRESSING YOURSELF: INDEPENDENT
BATHING: NEEDS ASSISTANCE
HEARING - LEFT EAR: DIFFICULTY WITH NOISE
HEARING - RIGHT EAR: DIFFICULTY WITH NOISE
PATIENT'S MEMORY ADEQUATE TO SAFELY COMPLETE DAILY ACTIVITIES?: YES
ADEQUATE_TO_COMPLETE_ADL: YES
LACK_OF_TRANSPORTATION: NO
FEEDING YOURSELF: NEEDS ASSISTANCE
LACK_OF_TRANSPORTATION: NO
ASSISTIVE_DEVICE: WALKER
GROOMING: NEEDS ASSISTANCE
JUDGMENT_ADEQUATE_SAFELY_COMPLETE_DAILY_ACTIVITIES: NO

## 2024-12-02 ASSESSMENT — LIFESTYLE VARIABLES
HOW OFTEN DO YOU HAVE 6 OR MORE DRINKS ON ONE OCCASION: NEVER
HOW MANY STANDARD DRINKS CONTAINING ALCOHOL DO YOU HAVE ON A TYPICAL DAY: 1 OR 2
HOW OFTEN DO YOU HAVE A DRINK CONTAINING ALCOHOL: 2-4 TIMES A MONTH
AUDIT-C TOTAL SCORE: 2
AUDIT-C TOTAL SCORE: 2
SKIP TO QUESTIONS 9-10: 1

## 2024-12-02 ASSESSMENT — PATIENT HEALTH QUESTIONNAIRE - PHQ9
SUM OF ALL RESPONSES TO PHQ9 QUESTIONS 1 & 2: 0
2. FEELING DOWN, DEPRESSED OR HOPELESS: NOT AT ALL
1. LITTLE INTEREST OR PLEASURE IN DOING THINGS: NOT AT ALL

## 2024-12-02 ASSESSMENT — PAIN - FUNCTIONAL ASSESSMENT
PAIN_FUNCTIONAL_ASSESSMENT: 0-10
PAIN_FUNCTIONAL_ASSESSMENT: 0-10
PAIN_FUNCTIONAL_ASSESSMENT: CPOT (CRITICAL CARE PAIN OBSERVATION TOOL)

## 2024-12-02 ASSESSMENT — PAIN SCALES - GENERAL
PAINLEVEL_OUTOF10: 5 - MODERATE PAIN
PAINLEVEL_OUTOF10: 0 - NO PAIN

## 2024-12-02 NOTE — PROGRESS NOTES
Speech-Language Pathology    [facility] Adult Inpatient Clinical Swallow Evaluation    Patient Name: Lorne Avina  MRN: [id number]  Today's Date: 12/2/2024   Time Calculation  Start Time: 1923  Stop Time: 1948  Time Calculation (min): 25 min       Current Problem:   1. Pulmonary embolism on right (Multi)  Transthoracic Echo (TTE) Complete    Transthoracic Echo (TTE) Complete      2. Fall, initial encounter        3. Acute pulmonary embolism without acute cor pulmonale, unspecified pulmonary embolism type (Multi)          Recommendations:  Diet: - Pureed (IDDSI Level 4)  - Moderately/honey thick liquids (IDDSI Level 3)-PRESENT 1/2 TEASPOON AT A TIME  Strategies:   - Small bites  - Small, single sips  - Alternate consistencies  - Upright for all PO intake  - No straws  - Liquids from spoon only  - Medications crushed in puree (verify with MD)  - Limit distractions  - Complete oral care frequently throughout the day  - Full supervision/1:1 assist during all PO (i.e., patient requires moderate to maximal assist to self feed with RUE).  Instrumental Evaluation: Modified Barium Swallow Study (MBSS) to assess swallow physiology  D/C PO and re-consult ST should patient present with S/S dysphagia.    Assessment:  Consistencies Trialed:   - Pureed (IDDSI Level 4)  - Thin liquids (IDDSI Level 0)  - Mildly/nectar thick liquids (IDDSI Level 2)  - Moderately/honey thick liquids (IDDSI Level 3)  Oral motor exam: Patient with L labial paresis, as well as significantly decreased labial strength R. Labial protrusion/overall labial strength were also significantly decreased, as was buccal strength/ROM. Lingual protrusion was slightly decreased, with minimal tremor noted. Lingual lateralization was slowed but WFL. Tongue was slightly deviated to L upon lingual elevation. Overall lingual strength was slightly decreased. Patient with missing dentition lower. Of note, unable to determine extent of dentition upper as patient indicated he felt pain  in upper L sulcus during oral care (please refer to Pain section of this evaluation for further details). Volitional swallow was WFL, whereas volitional cough was significantly weak.   Oral phase of swallowing was WFL (of note, patient with slowed bolus manipulation/mastication/A-P propulsion of single ice chips and puree, although still WFL), with functional oral preparation and clearance of all textures.   Pharyngeal dysphagia suspect, characterized by throat clearing/coughing after thin and mildly (nectar) thick liquids, throat clearing following moderately (honey) thick liquids (single boluses via cup and teaspoon); and throat clearing after 1 out of 3 half-teaspoon boluses honey thick liquids. The Hazel Swallow Protocol (i.e., 3 oz water challenge), then, could not be administered. No S/S pharyngeal dysphagia were noted following single ice chips or puree.    Patient was oriented to self/person, general place (hospital) and month.   Patient is considered to be at risk for aspiration.   Per the results of this assessment, patient is appropriate for PO at this time. Please refer to recommendations and precautions as noted above.   ST to continue to follow patient during inpatient stay.      Plan:   Skilled speech therapy for dysphagia treatment is warranted for ongoing assessment of oropharyngeal swallow function, to ensure tolerance of safest and least restrictive consistencies, to provide training and instruction regarding the use of compensatory swallow strategies and patient/caregiver education in order to reduce risk of aspiration, dehydration and malnutrition.  Frequency: 2x/week  Duration: 2 weeks  SLP Discharge Recommendations: SNF     Goals:  1. Patient will tolerate PO with no overt s/s of aspiration in 90% of observed therapeutic trials.  Progressing. Patient consumed 3 boluses puree and 5 one-half teaspoon sized moderately (honey) liquids via 1/2 teaspoon with no apparent S/S pharyngeal dysphagia.   2.  Patient/caregiver will implement safe swallowing strategies to reduce risk of aspiration in 90% of trials given caregiver assistance/cueing as needed.  Progressing. Given moderate verbal/visual cues re: above strategies, patient consumed puree and moderately (honey) liquids with no apparent S/S pharyngeal dysphagia.  3. Complete a modified barium swallow study (MBSS) for objective assessment of swallowing function, to assess for aspiration, and to guide further recommendations and treatment plan.  MBSS has been ordered.    Subjective:  Patient was seen bedside for clinical swallow evaluation, and was assisted into an upright position for PO trials. Although patient with overall waxing/waning alertness, he was able to rouse adequately for evaluation. Of note, patient often requesting to go home. Patient was reoriented. Vocal intensity was decreased, likely due to above alertness levels.      Baseline Assessment:  O2 use: 5L via NC     General Visit Information:  Clinical Swallow Evaluation ordered d/t concern for dysphagia. Current diet level is Regular with Thin 0, which is patient's baseline.  RN reports patient has a decreased level of consciousness, but may be able to participate in a clinical swallow evaluation.   Patient denies swallowing difficulty.    Relevant Past Medical History related to Rehab:  HTN, HLD, infrarenal AAA, Hx of SVT s/p ablation, CVA with residual left-sided weakness, chronic hypoxic respiratory failure 2/2 COPD on 2 L NC prn, and mild cognitive impairment who presented to ED after being found by daughter at home.  Believed to be down for approximately 3 days. Patient reportedly was found to be wedged between his bed and nightstand, incontinent of urine. Upon arrival to the emergency room patient remained confused A&O x1, to self. Patient was hypotensive 80s over 50s and tachycardic with a heart rate in the 120s with tachypnea with respiratory rate in the high 20s.  Initially on  nonrebreather successfully weaned down to 2 L nasal cannula.    Patient remained confused, limited ability to follow commands. On 2 L nasal cannula with an O2 sat of 94%, remained tachycardic on telemetry.   Patient resides alone at home. He previously declined skilled nursing facility or assisted living placement.    Relevant Imaging:  CT head wo IV contrast  Result Date: 12/1/2024  INDICATION: Signs/Symptoms: fall on thinners.  FINDINGS: Soft tissue hematomas are identified overlying the right orbit, maxilla, and calvarium as described above. There is no acute fracture.   Severe age-related atrophy and small-vessel ischemic changes of the cerebral white matter.   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 These findings have remained unchanged.   No CT evidence of acute intracranial hemorrhage or mass effect.   Mucoperiosteal thickening of the ethmoid and maxillary sinuses. There is fluid level in the left frontal compartment and left maxillary sinus which could reflect acute inflammatory changes. If there is clinical concern for occult facial fracture, CT examination can be performed.     CT chest abdomen pelvis w IV contrast  Result Date: 12/1/2024  STUDY: CT Chest, Abdomen, and Pelvis with IV Contrast  INDICATION: Fall - on blood thinners.   FINDINGS: 1.  No definite CT evidence of acute thoracic vascular injury. 2.  There is a solid spiculated lesion located within the left lower lobe which measures 1.8 cm in diameter.  This is suspicious for possible neoplasm.  Additional nodular densities are noted within the left upper lobe and right lower lobe as described above.  Recommend either a PET/CT for further evaluation or possible tissue sampling. 3.  No focal pulmonary consolidation, pleural effusions or pneumothorax.     Pain:  Rating scale: 0-10   During oral care, patient reported pain in his L  lateral sulcus, upper that he rated a 2. RN informed.     Treatment: Yes. Treatment provided following clinical swallow evaluation. Reviewed results and impressions in detail with patient. Discussed recommended diet textures and strategies for balancing aspiration/choking risk with quality of life, as well as rationale behind MBSS and procedure. Further discussed risk for aspiration, and need for strict adherence to aspiration precautions.   Patient unable to indicate safe swallow strategies (compensatory swallow techniques) per Clinical Swallow Evaluation this date. Verbal models provided for same.   Given moderate to maximal verbal/visual cues re: above strategies, patient consumed 3 boluses puree and 5 one-half teaspoon sized moderately (honey) liquids with no apparent S/S pharyngeal dysphagia. ST to continue to follow patient.    Education:   Learner patient; RN  Barriers to Learning Acuity of illness; cognitive communication limitations  Method demonstration; verbal; visual  Education-Topic SLP provided patient education regarding role of SLP, purpose of assessment and clinical impressions/recommendations. Patient verbalized questionable full comprehension, consistent with cognitive status. SLP further coordinated with RN regarding recommendations and precautions per this assessment, with RN verbalizing understanding.  Outcome: Needs review and reinforcement

## 2024-12-02 NOTE — CONSULTS
Inpatient consult to Neurology  Consult performed by: Cholo Mendez MD  Consult ordered by: Aye Gomez, APRN-CNP          History Of Present Illness  Lorne Avina is a 84 y.o. male presenting with unresponsiveness.    The patient lives at home by himself.  Apparently, he was found by his daughter on the ground at home.  He was brought to [facility].  Workup revealed an acute PE.  He was started on heparin gtt.  The patient was noted this morning to be completely unresponsive.  Neurology consulted.      Past Medical History  Past Medical History:   Diagnosis Date    AAA (abdominal aortic aneurysm) (CMS-HCC)     BPH (benign prostatic hyperplasia)     Cognitive impairment     COPD (chronic obstructive pulmonary disease) (Multi)     ETOH abuse     Falls     GERD (gastroesophageal reflux disease)     HLD (hyperlipidemia)     Hypertension     Non-compliance     Pericardial effusion ([facility])     Pulmonary embolism     Pulmonary nodules     Stroke (Multi)     SVT (supraventricular tachycardia) (CMS-HCC)      Surgical History  Past Surgical History:   Procedure Laterality Date    CAROTID ENDARTERECTOMY      CERVICAL SPINE SURGERY       Social History  Social History     Tobacco Use    Smoking status: Former     Types: Cigarettes    Smokeless tobacco: Never   Vaping Use    Vaping status: Never Used   Substance Use Topics    Alcohol use: Yes    Drug use: Never     Allergies  Patient has no known allergies.  Medications Prior to Admission   Medication Sig Dispense Refill Last Dose/Taking    albuterol 90 mcg/actuation inhaler Inhale 2 puffs every 6 hours if needed for wheezing or shortness of breath.   Unknown    atorvastatin (Lipitor) 20 mg tablet Take 1 tablet (20 mg) by mouth once daily.   11/27/2024 Morning    budesonide-formoteroL (Symbicort) 160-4.5 mcg/actuation inhaler Inhale 2 puffs 2 times a day.   11/27/2024 Evening    carvedilol (Coreg) 6.25 mg tablet Take 0.5 tablets (3.125 mg) by mouth 2 times a day.    "11/27/2024 Evening    clopidogrel (Plavix) 75 mg tablet Take 1 tablet (75 mg) by mouth once daily.   11/27/2024 Morning    donepezil (Aricept) 10 mg tablet Take 1 tablet (10 mg) by mouth once daily at bedtime.   11/27/2024 Bedtime    mirtazapine (Remeron) 15 mg tablet Take 0.5 tablets (7.5 mg) by mouth once daily at bedtime.   11/27/2024 Bedtime    tamsulosin (Flomax) 0.4 mg 24 hr capsule Take 1 capsule (0.4 mg) by mouth once daily at bedtime.   11/27/2024 Bedtime    tiotropium (Spiriva) 18 mcg inhalation capsule Place 1 capsule (18 mcg) into inhaler and inhale once daily.   11/27/2024 Morning       Review of Systems  Neurological Exam  Physical Exam  Last Recorded Vitals  Blood pressure 131/73, pulse (!) 112, temperature 36.3 °C (97.3 °F), temperature source Temporal, resp. rate 16, height 1.778 m (5' 10\"), weight 50.8 kg (112 lb 1.6 oz), SpO2 98%.    Gen: NAD  Neuro:  --HIF: Lethargic, Oriented to person only; repetition and naming intact; needs frequent stimulation to wake him up  --CN:  PERRLA, EOM grossly intact, No clear blink to threat on the left, no visible facial asymmetry  --Motor: Left sided hemiparesis noted  --Sensory: Intact to light touch  --Reflex: 2+ symmetric, toes down  --Cerebellum: Unable to perform due to AMS  --Gait: Deferred     Relevant Results:  CT Head 12/1/2024 (I personally reviewed the images/tracings with the following interpretation)  No acute changes  Old infarcts noted in the right hemisphere                    Denis Coma Scale  Best Eye Response: Spontaneous  Best Verbal Response: Confused  Best Motor Response: Follows commands  Holton Coma Scale Score: 14                 I have personally reviewed the following imaging results CT angio chest for pulmonary embolism    Result Date: 12/1/2024  STUDY: CT Angiogram of the Chest; 12/01/2024 4:00 pm INDICATION: Elevated D-Dimer, down for 4 days. COMPARISON: CT CAP 12/01/2024. ACCESSION NUMBER(S): [id number] ORDERING CLINICIAN: " LINDA WINTERS TECHNIQUE:  CTA of the chest was performed with intravenous contrast. Images are reviewed and processed at a workstation according to the CT angiogram protocol with 3-D and/or MIP post processing imaging generated.  Automated mA/kV exposure control was utilized and patient examination was performed in strict accordance with principles of ALARA. FINDINGS: Pulmonary arteries are adequately opacified and there is a single pulmonary embolism involving the right interlobar pulmonary artery extending into the anterobasilar segment.  The thoracic aorta is normal in course and caliber without dissection or aneurysm. Small pericardial effusion.  Severe coronary artery calcifications noted.  Thoracic lymph nodes are not enlarged. There is no pleural effusion, pleural thickening, or pneumothorax. The airways are patent. Emphysematous changes again noted.  Again noted are approximately 3 nodular lesions involving the left upper lobe, left lower lobe and right lower lobe unchanged from exam from the same day measuring up to approximately 2 cm involving the left upper lobe and left lower lobe Upper abdomen demonstrates no acute pathology. There are no acute fractures.  No suspicious bony lesions.    1. Single pulmonary embolism involving the right interlobar pulmonary artery extending into the anterobasilar segment.  Minimal embolic burden.  No evidence of right heart strain or pulmonary infarct. 2. Again noted are approximately 3 nodular lesions involving the left upper lobe, left lower lobe and right lower lobe unchanged from exam from the same day measuring up to approximately 2 cm involving the left upper lobe and left lower lobe.  Findings concerning for possible neoplasm.  Recommend further evaluation with PET/CT. 3. Emphysema. 4. Small pericardial effusion. 5. Severe coronary artery calcifications. Findings discussed with and acknowledged by Jelly Arana 4:28 PM Signed by MD NATY Monahan  tibia fibula bilateral 2 views    Result Date: 12/1/2024  STUDY: Bilateral Tibia and Fibula Radiographs; 12/1/2024 2:00 PM INDICATION: Fall. COMPARISON: None Available. ACCESSION NUMBER(S): [id number] ORDERING CLINICIAN: ROSANA BRUCE TECHNIQUE:  Two view(s) of the right tibia/fibula (four images) and two view(s) of the left tibia/fibula (four images). FINDINGS:  RIGHT TIBIA AND FIBULA:  There is no displaced fracture.  The alignment is anatomic.  No soft tissue abnormality is seen. LEFT TIBIA AND FIBULA:  There is no displaced fracture.  The alignment is anatomic.  No soft tissue abnormality is seen.    No acute osseous abnormality. Signed by Lorne Victor MD    XR femur 2 VW bilateral    Result Date: 12/1/2024  STUDY: Bilateral Femur Radiographs; 12/1/2024 2:00 PM INDICATION: Fall. COMPARISON: None Available. ACCESSION NUMBER(S): [id number] ORDERING CLINICIAN: ROSANA BRUCE TECHNIQUE:  Two view(s) of the right femur (four images) and two view(s) of the left femur (four images). FINDINGS:  Mild generalized osseous demineralization is noted. Right:  There is no displaced fracture.  The alignment is anatomic. No soft tissue abnormality is seen. Left:  There is no displaced fracture.  The alignment is anatomic.  No soft tissue abnormality is seen. Excreted contrast is noted in the urinary bladder.  Peripheral vascular calcifications are seen bilaterally.    No definite acute osseous abnormality identified. Signed by Aiden Barrow    XR ribs 2 views left w chest pa or ap    Result Date: 12/1/2024  STUDY: Left Rib and Chest Radiographs INDICATION: Fall. COMPARISON: None Available. ACCESSION NUMBER(S): [id number] ORDERING CLINICIAN: ROSANA BRUCE TECHNIQUE:  Frontal chest and five view(s) of the left ribs. FINDINGS:  CARDIOMEDIASTINAL SILHOUETTE: Cardiomediastinal silhouette is normal in size and configuration. Calcified plaque is seen in the aorta.  LUNGS: Lungs are clear.  ABDOMEN: No remarkable upper abdominal  findings.  Excreted contrast is seen in the collecting systems. LEFT RIBS:  There is no acute rib fracture. OTHER VISUALIZED BONES: No acute osseous changes.  Cervical fusion hardware is partially visualized.  Mild levoconvex curvature is seen of the mid thoracic spine.    No definite acute cardiopulmonary disease. No definite acute left rib fracture identified. Signed by Aiden Barrow    XR pelvis 1-2 views    Result Date: 12/1/2024  STUDY: Pelvis Radiographs; 12/1/2024 2:00 PM INDICATION: Fall. COMPARISON: None Available. ACCESSION NUMBER(S): [id number] ORDERING CLINICIAN: ROSANA BRUCE TECHNIQUE:  One view(s) of the pelvis. FINDINGS:  The pelvic ring is intact.  There is no acute fracture.  Mild degenerative changes of both hips.    No acute osseous abnormality. Signed by Bong Nixon MD    CT chest abdomen pelvis w IV contrast    Result Date: 12/1/2024  STUDY: CT Chest, Abdomen, and Pelvis with IV Contrast, CT Thoracic Spine and Lumbar Spine without IV Contrast; 12/01/2024 1:20 PM INDICATION: Fall - on blood thinners. COMPARISON: CT cervical spine 10/12/2023. ACCESSION NUMBER(S): [id number], [id number], [id number] ORDERING CLINICIAN: LINDA WINTERS TECHNIQUE: CT of the chest, abdomen, and pelvis was performed.  Contiguous axial images were obtained at 3 mm slice thickness through the chest, abdomen, and pelvis.  Coronal and sagittal reconstructions at 3 mm slice thickness were performed.  Omnipaque 350 60 mL was administered intravenously.  Please note that spinal images were generated from the original CT abdomen and pelvis imaging. FINDINGS: CHEST: MEDIASTINUM: The heart is normal in size without pericardial effusion.  Aortic valvular and coronary artery calcifications are visualized.  Central vascular structures opacify normally.  LUNGS/PLEURA: There is no pleural effusion, pleural thickening, or pneumothorax. The airways are patent. The lungs demonstrate emphysematous changes bilaterally with a  predominantly centrilobular distribution in an upper lobe predilection.  There is a spiculated lesion located within the left lower lobe measuring 1.8 cm in diameter (204-156) .  An additional ill-defined nodular density is noted within the left upper lobe which measures 1.8 cm in diameter (204-160) .  There is an ill-defined groundglass nodular density located within the right lower lobe measuring 9 mm in diameter (204-188).  The graft there is biapical pleural-parenchymal scarring noted. LYMPH NODES: There is no evidence of significant thoracic lymphadenopathy by CT size criteria.. ABDOMEN:  LIVER: No hepatomegaly.  Smooth surface contour.  Normal attenuation.  There are subcentimeter hypoattenuating lesions noted within the liver. Largest measures 9 mm in diameter (201-102).  These are too small to characterize by size criteria.  There is an additional hypodense lesion located within left hepatic lobe measuring 1.4 cm in diameter (201-94) which likely represents a cyst or hemangioma.  BILE DUCTS: No intrahepatic or extrahepatic biliary ductal dilatation.  GALLBLADDER: The gallbladder is visualized.  Radiopaque calculi are noted. STOMACH: No abnormalities identified.  PANCREAS: No masses or ductal dilatation.  SPLEEN: There are calcifications noted within the spleen and liver which may represent sequelae of previous granulomatous disease.  ADRENAL GLANDS: No thickening or nodules.  KIDNEYS AND URETERS: Kidneys are normal in size and location.  No renal or ureteral calculi.  There are bilateral renal cysts noted.  The largest within the right kidney measures proximally 4.9 cm in diameter.  No hydronephrosis or nephrolithiasis.  PELVIS:  BLADDER: No abnormalities identified.  REPRODUCTIVE ORGANS: No abnormalities identified.  BOWEL: No abnormalities identified.  VESSELS: There are severe calcific atherosclerotic changes of the abdominal aorta and iliac vessels.  There is an infrarenal abdominal aortic aneurysm noted  containing mural thrombus which measures up to 4.1 cm in diameter (201-138).  The visceral vessels enhance normally.  The inferior mesenteric artery is not clearly visualized..  There are severe calcific atherosclerotic changes of the iliac vessels. Calcific atherosclerotic changes of the internal iliac arteries are noted bilaterally.  PERITONEUM/RETROPERITONEUM/LYMPH NODES: No free fluid.  No pneumoperitoneum. There is no evidence of significant abdominal or pelvic lymphadenopathy by CT size criteria.  ABDOMINAL WALL: There is colonic calculus is noted without CT evidence of diverticulitis.  The appendix is not clearly visualized.  There is no definite evidence of bowel wall thickening or dilatation to suggest mechanical obstruction.  SOFT TISSUES: There are infiltrative changes and ill-defined fluid noted overlying the subcutaneous soft tissues of the left posterior pelvis and hip (201-179) sees.  BONES: No acute fracture or aggressive osseous lesion.  Status post anterior fusion of the lower cervical spine.  THORACIC SPINE: The alignment is anatomic.  There is no fracture or traumatic subluxation. The vertebral body heights are well maintained.  There are multilevel degenerative changes of the mid to distal thoracic spine with anterior marginal spurring noted..  No significant central canal stenosis is demonstrated.  The neural foramina are patent throughout.  The paravertebral soft tissues are within normal limits. LUMBAR SPINE: The alignment is anatomic.  There is no fracture or traumatic subluxation. The vertebral body heights are well maintained.  There is multilevel degenerative disc disease of the lumbar spine with changes most prominently noted at the L1-L2 and L2-L3 levels with associated anterior osteophytosis.  No definite spondylolisthesis..  No significant central canal stenosis is demonstrated.  There is some mild bilateral neuroforamina narrowing noted at the L5-S1 level.  Mild left narrowing with  associated facet hypertrophy may be noted at the L4-L5 level..  The paravertebral soft tissues are within normal limits.    1.  No definite CT evidence of acute thoracic vascular injury. 2.  There is a solid spiculated lesion located within the left lower lobe which measures 1.8 cm in diameter.  This is suspicious for possible neoplasm.  Additional nodular densities are noted within the left upper lobe and right lower lobe as described above.  Recommend either a PET/CT for further evaluation or possible tissue sampling. 3.  No focal pulmonary consolidation, pleural effusions or pneumothorax. 4.  No definite CT evidence of abdominal or pelvic visceral/vascular injury. 5.  No intra-abdominal/pelvic fluid collections pneumoperitoneum. 6.  No acute thoracic or lumbar vertebral body compression/fracture. 7.  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 8. other findings as stated above. Signed by Terrence Borjas MD    CT thoracic spine wo IV contrast    Result Date: 12/1/2024  STUDY: CT Chest, Abdomen, and Pelvis with IV Contrast, CT Thoracic Spine and Lumbar Spine without IV Contrast; 12/01/2024 1:20 PM INDICATION: Fall - on blood thinners. COMPARISON: CT cervical spine 10/12/2023. ACCESSION NUMBER(S): [id number], [id number], [id number] ORDERING CLINICIAN: LINDA WINTERS TECHNIQUE: CT of the chest, abdomen, and pelvis was performed.  Contiguous axial images were obtained at 3 mm slice thickness through the chest, abdomen, and pelvis.  Coronal and sagittal reconstructions at 3 mm slice thickness were performed.  Omnipaque 350 60 mL was administered intravenously.  Please note that spinal images were generated from the original CT abdomen and pelvis imaging. FINDINGS: CHEST: MEDIASTINUM: The heart is normal in size without pericardial effusion.  Aortic valvular and coronary artery calcifications are visualized.  Central vascular structures opacify normally.   LUNGS/PLEURA: There is no pleural effusion, pleural thickening, or pneumothorax. The airways are patent. The lungs demonstrate emphysematous changes bilaterally with a predominantly centrilobular distribution in an upper lobe predilection.  There is a spiculated lesion located within the left lower lobe measuring 1.8 cm in diameter (204-156) .  An additional ill-defined nodular density is noted within the left upper lobe which measures 1.8 cm in diameter (204-160) .  There is an ill-defined groundglass nodular density located within the right lower lobe measuring 9 mm in diameter (204-188).  The graft there is biapical pleural-parenchymal scarring noted. LYMPH NODES: There is no evidence of significant thoracic lymphadenopathy by CT size criteria.. ABDOMEN:  LIVER: No hepatomegaly.  Smooth surface contour.  Normal attenuation.  There are subcentimeter hypoattenuating lesions noted within the liver. Largest measures 9 mm in diameter (201-102).  These are too small to characterize by size criteria.  There is an additional hypodense lesion located within left hepatic lobe measuring 1.4 cm in diameter (201-94) which likely represents a cyst or hemangioma.  BILE DUCTS: No intrahepatic or extrahepatic biliary ductal dilatation.  GALLBLADDER: The gallbladder is visualized.  Radiopaque calculi are noted. STOMACH: No abnormalities identified.  PANCREAS: No masses or ductal dilatation.  SPLEEN: There are calcifications noted within the spleen and liver which may represent sequelae of previous granulomatous disease.  ADRENAL GLANDS: No thickening or nodules.  KIDNEYS AND URETERS: Kidneys are normal in size and location.  No renal or ureteral calculi.  There are bilateral renal cysts noted.  The largest within the right kidney measures proximally 4.9 cm in diameter.  No hydronephrosis or nephrolithiasis.  PELVIS:  BLADDER: No abnormalities identified.  REPRODUCTIVE ORGANS: No abnormalities identified.  BOWEL: No abnormalities  identified.  VESSELS: There are severe calcific atherosclerotic changes of the abdominal aorta and iliac vessels.  There is an infrarenal abdominal aortic aneurysm noted containing mural thrombus which measures up to 4.1 cm in diameter (201-138).  The visceral vessels enhance normally.  The inferior mesenteric artery is not clearly visualized..  There are severe calcific atherosclerotic changes of the iliac vessels. Calcific atherosclerotic changes of the internal iliac arteries are noted bilaterally.  PERITONEUM/RETROPERITONEUM/LYMPH NODES: No free fluid.  No pneumoperitoneum. There is no evidence of significant abdominal or pelvic lymphadenopathy by CT size criteria.  ABDOMINAL WALL: There is colonic calculus is noted without CT evidence of diverticulitis.  The appendix is not clearly visualized.  There is no definite evidence of bowel wall thickening or dilatation to suggest mechanical obstruction.  SOFT TISSUES: There are infiltrative changes and ill-defined fluid noted overlying the subcutaneous soft tissues of the left posterior pelvis and hip (201-179) sees.  BONES: No acute fracture or aggressive osseous lesion.  Status post anterior fusion of the lower cervical spine.  THORACIC SPINE: The alignment is anatomic.  There is no fracture or traumatic subluxation. The vertebral body heights are well maintained.  There are multilevel degenerative changes of the mid to distal thoracic spine with anterior marginal spurring noted..  No significant central canal stenosis is demonstrated.  The neural foramina are patent throughout.  The paravertebral soft tissues are within normal limits. LUMBAR SPINE: The alignment is anatomic.  There is no fracture or traumatic subluxation. The vertebral body heights are well maintained.  There is multilevel degenerative disc disease of the lumbar spine with changes most prominently noted at the L1-L2 and L2-L3 levels with associated anterior osteophytosis.  No definite  spondylolisthesis..  No significant central canal stenosis is demonstrated.  There is some mild bilateral neuroforamina narrowing noted at the L5-S1 level.  Mild left narrowing with associated facet hypertrophy may be noted at the L4-L5 level..  The paravertebral soft tissues are within normal limits.    1.  No definite CT evidence of acute thoracic vascular injury. 2.  There is a solid spiculated lesion located within the left lower lobe which measures 1.8 cm in diameter.  This is suspicious for possible neoplasm.  Additional nodular densities are noted within the left upper lobe and right lower lobe as described above.  Recommend either a PET/CT for further evaluation or possible tissue sampling. 3.  No focal pulmonary consolidation, pleural effusions or pneumothorax. 4.  No definite CT evidence of abdominal or pelvic visceral/vascular injury. 5.  No intra-abdominal/pelvic fluid collections pneumoperitoneum. 6.  No acute thoracic or lumbar vertebral body compression/fracture. 7.  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 8. other findings as stated above. Signed by Terrence Borjas MD    CT lumbar spine wo IV contrast    Result Date: 12/1/2024  STUDY: CT Chest, Abdomen, and Pelvis with IV Contrast, CT Thoracic Spine and Lumbar Spine without IV Contrast; 12/01/2024 1:20 PM INDICATION: Fall - on blood thinners. COMPARISON: CT cervical spine 10/12/2023. ACCESSION NUMBER(S): [id number], [id number], [id number] ORDERING CLINICIAN: LINDA WINTERS TECHNIQUE: CT of the chest, abdomen, and pelvis was performed.  Contiguous axial images were obtained at 3 mm slice thickness through the chest, abdomen, and pelvis.  Coronal and sagittal reconstructions at 3 mm slice thickness were performed.  Omnipaque 350 60 mL was administered intravenously.  Please note that spinal images were generated from the original CT abdomen and pelvis imaging. FINDINGS: CHEST:  MEDIASTINUM: The heart is normal in size without pericardial effusion.  Aortic valvular and coronary artery calcifications are visualized.  Central vascular structures opacify normally.  LUNGS/PLEURA: There is no pleural effusion, pleural thickening, or pneumothorax. The airways are patent. The lungs demonstrate emphysematous changes bilaterally with a predominantly centrilobular distribution in an upper lobe predilection.  There is a spiculated lesion located within the left lower lobe measuring 1.8 cm in diameter (204-156) .  An additional ill-defined nodular density is noted within the left upper lobe which measures 1.8 cm in diameter (204-160) .  There is an ill-defined groundglass nodular density located within the right lower lobe measuring 9 mm in diameter (204-188).  The graft there is biapical pleural-parenchymal scarring noted. LYMPH NODES: There is no evidence of significant thoracic lymphadenopathy by CT size criteria.. ABDOMEN:  LIVER: No hepatomegaly.  Smooth surface contour.  Normal attenuation.  There are subcentimeter hypoattenuating lesions noted within the liver. Largest measures 9 mm in diameter (201-102).  These are too small to characterize by size criteria.  There is an additional hypodense lesion located within left hepatic lobe measuring 1.4 cm in diameter (201-94) which likely represents a cyst or hemangioma.  BILE DUCTS: No intrahepatic or extrahepatic biliary ductal dilatation.  GALLBLADDER: The gallbladder is visualized.  Radiopaque calculi are noted. STOMACH: No abnormalities identified.  PANCREAS: No masses or ductal dilatation.  SPLEEN: There are calcifications noted within the spleen and liver which may represent sequelae of previous granulomatous disease.  ADRENAL GLANDS: No thickening or nodules.  KIDNEYS AND URETERS: Kidneys are normal in size and location.  No renal or ureteral calculi.  There are bilateral renal cysts noted.  The largest within the right kidney measures  proximally 4.9 cm in diameter.  No hydronephrosis or nephrolithiasis.  PELVIS:  BLADDER: No abnormalities identified.  REPRODUCTIVE ORGANS: No abnormalities identified.  BOWEL: No abnormalities identified.  VESSELS: There are severe calcific atherosclerotic changes of the abdominal aorta and iliac vessels.  There is an infrarenal abdominal aortic aneurysm noted containing mural thrombus which measures up to 4.1 cm in diameter (201-138).  The visceral vessels enhance normally.  The inferior mesenteric artery is not clearly visualized..  There are severe calcific atherosclerotic changes of the iliac vessels. Calcific atherosclerotic changes of the internal iliac arteries are noted bilaterally.  PERITONEUM/RETROPERITONEUM/LYMPH NODES: No free fluid.  No pneumoperitoneum. There is no evidence of significant abdominal or pelvic lymphadenopathy by CT size criteria.  ABDOMINAL WALL: There is colonic calculus is noted without CT evidence of diverticulitis.  The appendix is not clearly visualized.  There is no definite evidence of bowel wall thickening or dilatation to suggest mechanical obstruction.  SOFT TISSUES: There are infiltrative changes and ill-defined fluid noted overlying the subcutaneous soft tissues of the left posterior pelvis and hip (201-179) sees.  BONES: No acute fracture or aggressive osseous lesion.  Status post anterior fusion of the lower cervical spine.  THORACIC SPINE: The alignment is anatomic.  There is no fracture or traumatic subluxation. The vertebral body heights are well maintained.  There are multilevel degenerative changes of the mid to distal thoracic spine with anterior marginal spurring noted..  No significant central canal stenosis is demonstrated.  The neural foramina are patent throughout.  The paravertebral soft tissues are within normal limits. LUMBAR SPINE: The alignment is anatomic.  There is no fracture or traumatic subluxation. The vertebral body heights are well maintained.   There is multilevel degenerative disc disease of the lumbar spine with changes most prominently noted at the L1-L2 and L2-L3 levels with associated anterior osteophytosis.  No definite spondylolisthesis..  No significant central canal stenosis is demonstrated.  There is some mild bilateral neuroforamina narrowing noted at the L5-S1 level.  Mild left narrowing with associated facet hypertrophy may be noted at the L4-L5 level..  The paravertebral soft tissues are within normal limits.    1.  No definite CT evidence of acute thoracic vascular injury. 2.  There is a solid spiculated lesion located within the left lower lobe which measures 1.8 cm in diameter.  This is suspicious for possible neoplasm.  Additional nodular densities are noted within the left upper lobe and right lower lobe as described above.  Recommend either a PET/CT for further evaluation or possible tissue sampling. 3.  No focal pulmonary consolidation, pleural effusions or pneumothorax. 4.  No definite CT evidence of abdominal or pelvic visceral/vascular injury. 5.  No intra-abdominal/pelvic fluid collections pneumoperitoneum. 6.  No acute thoracic or lumbar vertebral body compression/fracture. 7.  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 8. other findings as stated above. Signed by Terrence Borjas MD    CT head wo IV contrast    Result Date: 12/1/2024  Interpreted By:  Hai Kruger, STUDY: CT HEAD WO IV CONTRAST; ;  12/1/2024 1:10 pm   INDICATION: Signs/Symptoms:fall on thinners.   COMPARISON: 10/12/2023   ACCESSION NUMBER(S): [id number]   ORDERING CLINICIAN: LINDA WINTERS   TECHNIQUE: Contiguous unenhanced axial CT sections are performed from the skull base to the vertex.   The study is limited by motion degradation.   FINDINGS: There is mucoperiosteal thickening of the ethmoid air cells and maxillary sinuses. A fluid level is identified in the left frontal compartment and small  fluid level in the left maxillary sinus. The mastoid air cells are clear. There is right periorbital hematoma and hematoma overlying the frontal calvarium at the midline and to the left of midline. There is some soft tissue swelling also noted over the posterior parietal calvarium. There is no sign of depressed calvarial fracture. The visualized osseous structures are intact.   There is moderate to severe generalized parenchymal volume loss with enlargement of the cortical sulci and CSF spaces similar to the previous study. There is diffuse hypoattenuation in the cerebral white matter bilaterally compatible with small-vessel ischemia in greater on the right. These findings are also similar to the previous study. 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 These findings are also unchanged. Old infarct is also suspected in the posterior right cerebellum which is stable as well.   There is no sign of parenchymal hematoma or dense extra-axial fluid collection. There is no mass effect or midline shift. The gray matter/white matter distribution is preserved.       Soft tissue hematomas are identified overlying the right orbit, maxilla, and calvarium as described above. There is no acute fracture.   Severe age-related atrophy and small-vessel ischemic changes of the cerebral white matter.   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 These findings have remained unchanged.   No CT evidence of acute intracranial hemorrhage or mass effect.   Mucoperiosteal thickening of the ethmoid and maxillary sinuses. There is fluid level in the left frontal compartment and left maxillary sinus which could reflect acute inflammatory changes. If there is clinical concern for occult facial fracture, CT examination can be performed.      MACRO: None   Signed by: Hai Kruger 12/1/2024 1:29 PM Dictation workstation:   [id number]    CT cervical spine wo IV contrast    Result Date: 12/1/2024  Interpreted By:  Hai Kruger, STUDY: CT CERVICAL SPINE WO IV CONTRAST; ;  12/1/2024 1:10 pm   INDICATION: Signs/Symptoms:fall on thinners.   COMPARISON: 10/12/2023   ACCESSION NUMBER(S): [id number]   ORDERING CLINICIAN: LINDA WINTERS   TECHNIQUE: Contiguous axial CT sections are performed from the skullbase to the upper thoracic spine and supplemented with coronal and sagittal reformatted images.   FINDINGS: The patient is status post anterior cervical fusion from C4 through C7 with plate and screw fixation. The hardware is intact. There is no lucency surrounding the hardware. The appearance is stable from 10/12/2023.   There is mild levo convexity of the lumbar spine and straightening of the lumbar lordosis. The facet joints align normally.   There is cervical spondylosis with disc space narrowing at C7-T1 and to a lesser extent C3-4. This appearance is also stable.   The cervical vertebral body heights are maintained. The C1 ring is intact. There is no sign of cervical vertebral fracture. There is no bone destruction or aggressive periosteal reaction. No lytic or blastic lesion is detected.   There is multiple bulging disc and marginal osteophyte with multilevel central canal narrowing which is greatest at C5-6 and C6-7. This appearance is also unchanged. There is bilateral multilevel neural foraminal narrowing secondary to facet and uncovertebral arthrosis which is greatest at C3-4 on the right. Multilevel hypertrophic facet arthrosis is most pronounced at C2-3 on the left at C3-4 on the right.   The surrounding soft tissues demonstrate biapical pleural and parenchymal scarring with emphysematous changes. There is no prevertebral soft tissue swelling or retropharyngeal air.       Status post multilevel anterior cervical fusion from C4  through C7. There is no hardware failure.   No acute fracture or subluxation.   Multilevel degenerative changes with central canal and neural foraminal narrowing as detailed above, stable from 10/12/2023.     MACRO: None   Signed by: Hai Kruger 12/1/2024 1:22 PM Dictation workstation:   [id number]       Assessment/Plan   Assessment & Plan  Pulmonary embolism on right (Multi)    PE (pulmonary thromboembolism) (Multi)    HTN (hypertension)    Elevated HDL    Incidental pulmonary nodule    Chronic hypoxic respiratory failure, on home oxygen therapy (Multi)    Rhabdomyolysis     Unresponsive Episode  - patient admitted after a fall; workup revealed a PE  - this morning, he was found unresponsive prompting a neurology consult  - on my exam, he awakens to verbal stimulation; he is oriented to person only but does follow commands; 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

## 2024-12-02 NOTE — CARE PLAN
The patient's goals for the shift include      The clinical goals for the shift include Pt will remain free from fall/injury

## 2024-12-02 NOTE — ED PROVIDER NOTES
HPI   Chief Complaint   Patient presents with    Trauma    Fall       Patient is a 84-year-old male with history of AAA, BPH, COPD, HLD, HTN, PAD on Eliquis, on blood thinners presenting [facility] ED for 3 days of unwitnessed fall.  Per daughter, patient was found lodged between the bed and wall.  Patient was last checked on 3 days ago.  Patient had unknown downtime.  Patient history limited due to acute distress.              Patient History   Past Medical History:   Diagnosis Date    AAA (abdominal aortic aneurysm) (CMS-HCC)     BPH (benign prostatic hyperplasia)     Cognitive impairment     COPD (chronic obstructive pulmonary disease) (Multi)     ETOH abuse     Falls     GERD (gastroesophageal reflux disease)     HLD (hyperlipidemia)     Hypertension     Non-compliance     [facility] effusion ([facility])     Pulmonary embolism     Pulmonary nodules     Stroke (Multi)     SVT (supraventricular tachycardia) (CMS-HCC)      Past Surgical History:   Procedure Laterality Date    CAROTID ENDARTERECTOMY      CERVICAL SPINE SURGERY       No family history on file.  Social History     Tobacco Use    Smoking status: Former     Types: Cigarettes    Smokeless tobacco: Never   Vaping Use    Vaping status: Never Used   Substance Use Topics    Alcohol use: Yes    Drug use: Never       Physical Exam   ED Triage Vitals   Temp Heart Rate Resp BP   12/01/24 1158 12/01/24 1158 12/01/24 1158 12/01/24 1158   37 °C (98.6 °F) (!) 126 (!) 26 84/53      SpO2 Temp Source Heart Rate Source Patient Position   12/01/24 1420 12/01/24 1322 12/01/24 1158 12/01/24 1158   (!) 84 % Temporal Monitor Lying      BP Location FiO2 (%)     12/01/24 1158 12/01/24 1215     Left arm 100 %       Physical Exam  Constitutional:       Appearance: He is ill-appearing and toxic-appearing.   HENT:      Head: Normocephalic and atraumatic.      Nose: Nose normal.      Mouth/Throat:      Mouth: Mucous membranes are moist.      Pharynx: Oropharynx is clear.   Eyes:       Extraocular Movements: Extraocular movements intact.      Conjunctiva/sclera: Conjunctivae normal.      Pupils: Pupils are equal, round, and reactive to light.   Cardiovascular:      Rate and Rhythm: Normal rate and regular rhythm.      Pulses: Normal pulses.      Heart sounds: Normal heart sounds.   Pulmonary:      Effort: Pulmonary effort is normal.      Breath sounds: Normal breath sounds.   Abdominal:      General: Abdomen is flat. Bowel sounds are normal.      Palpations: Abdomen is soft.   Musculoskeletal:         General: Normal range of motion.      Cervical back: Normal range of motion and neck supple.      Comments: Physical exam remarkable for periorbital edema around the right eye, left eyebrow abrasion, left chest wall tenderness, bruising of the left chest.  Bruising to bilateral knees.  Pain on range of motion of the left knee and left hip.  Distal pulses diminished.     Skin:     General: Skin is warm and dry.      Capillary Refill: Capillary refill takes less than 2 seconds.      Findings: Bruising present.      Comments: Physical exam remarkable for periorbital edema around the right eye, left eyebrow abrasion, left chest wall tenderness, bruising of the left chest.  Bruising to bilateral knees.  Pain on range of motion of the left knee and left hip.  Distal pulses diminished.     Neurological:      General: No focal deficit present.      Mental Status: He is oriented to person, place, and time. Mental status is at baseline.   Psychiatric:         Mood and Affect: Mood normal.         Behavior: Behavior normal.           ED Course & [facility]   ED Course as of 12/02/24 1707   Sun Dec 01, 2024   1212 Patient is a 84-year-old male, on blood thinners presenting [facility] ED for 3 days of unwitnessed fall.  Patient was last checked on 3 days ago.  Patient had unknown downtime.  Patient ANO x 3. [MI]   1213 Physical exam remarkable for periorbital edema around the right eye, left eyebrow abrasion, left  chest wall tenderness, bruising of the left chest.  Bruising to bilateral knees.  Pain on range of motion of the left knee and left hip.  Distal pulses diminished. [MI]   1603 PE seen on CT angiogram.  Discussed with pharmacy, no contraindication for heparin at this time.  Patient likely has not been taking his medications since his fall.  Heparin ordered. [PS]   1635 Radiologist calls, confirms right interlobar pulmonary artery.  Minimal embolic burden, no evidence of heart strain is present. [MI]      ED Course User Index  [MI] Edel Hutchinson MD  [PS] Ghassan Seymour DO                 No data recorded     Denis Coma Scale Score: 14 (12/01/24 1205 : Ale Roach RN)                           Medical Decision Making  Patient is a 84 y.o. male who presents to [facility] ED for Trauma and Fall. On initial ED evaluation, patient found to be in mild distress. Per HPI, concern to evaluate and treat for head injury, neck injury, rhabdomyolysis, possible PE given increased oxygenation needed.  Obtaining infectious lab workup, cardiac lab workup, CT PE study, relevant CT/x-ray imaging for possible underlying injuries.  Patient EKG showed no acute ischemic change.  Patient did have elevated troponin 127.  Patient CK elevated at 1533, likely secondary from prolonged downtime status.  CBC showed leukocytosis of 20.9.  Patient CMP showed no significant DANIKA or electrolyte abnormality.  CT head showed no acute hemorrhage or other abnormality.  CT C-spine negative for any acute fracture or subluxation of the cervical spine.  CT T-spine, L-spine also negative for underlying injury.  X-ray pelvis showed no acute abnormality.  X-ray ribs, femurs, tibias showed no underlying acute traumatic injury.  CT PE study did show single pulmonary embolism involving the right lower interval for pulmonary artery extending into the anterior basilar segment.  Patient started on heparin therapy.  Patient to be admitted to medicine service for  further evaluation and management of pulmonary embolism.  Patient hemodynamically stable at this time.        Procedure  Procedures     Edel Hutchinson MD  Resident  12/02/24 9685

## 2024-12-02 NOTE — CONSULTS
[facility] In-patient Consult Note    Consulting Cardiologist: Andrzej Mae MD  Primary Cardiologist:    Reason for Consult: Elevated troponin    Assessment/Plan:    Elevated troponin: Patient has 2 potential causes for elevated troponin in the setting of probable significant coronary artery disease.  The rhabdo plus the pulmonary embolism are both likely causes for the elevated troponin.  I do not think this represents ACS.  Await echocardiogram results.  Continue hydration and heparin.  Unlikely will pursue ischemic testing but need to see how he progresses neurologically and what his echocardiogram shows.  EKG shows new right bundle branch block which is consistent with the pulmonary embolism but no acute ST or T wave abnormalities.  2.  Pulmonary embolism: No CT evidence of right heart strain.  Continue anticoagulation.        History Of Present Illness:    Lorne Avina is a 84 y.o. male presenting with being found down at home.  Patient appears to have a pulmonary embolism along with rhabdomyolysis.  Patient has an elevated BUN/creatinine ratio consistent with dehydration.  Patient has a small elevation in troponin.  He is currently unresponsive.  No known prior CAD.  Patient has prior history of SVT ablation and apparently had a stroke during the procedure.  No other cardiac issues that are known.  CT scan of the chest yesterday revealed significant coronary artery calcification suggestive of multivessel CAD..       Past Medical History:  He has a past medical history of AAA (abdominal aortic aneurysm) (CMS-HCC), BPH (benign prostatic hyperplasia), Cognitive impairment, COPD (chronic obstructive pulmonary disease) (Multi), ETOH abuse, Falls, GERD (gastroesophageal reflux disease), HLD (hyperlipidemia), Hypertension, Non-compliance, Pericardial effusion ([facility]), Pulmonary embolism, Pulmonary nodules, Stroke (Multi), and SVT (supraventricular tachycardia) (CMS-HCC).    Past Surgical  History:  He has a past surgical history that includes Cervical spine surgery and Carotid endarterectomy.    Problem List:  Patient Active Problem List   Diagnosis    PE (pulmonary thromboembolism) (Multi)    HTN (hypertension)    Elevated HDL    Incidental pulmonary nodule    Chronic hypoxic respiratory failure, on home oxygen therapy (Multi)    Pulmonary embolism on right (Multi)    Rhabdomyolysis    Elevated troponin    Elevated brain natriuretic peptide (BNP) level    Leukocytosis    Metabolic encephalopathy       Social History:  He reports that he has quit smoking. His smoking use included cigarettes. He has never used smokeless tobacco. He reports current alcohol use. He reports that he does not use drugs.    Family History:  No family history on file.     Allergies:  Patient has no known allergies.    Inpatient Medications:  Scheduled medications   Medication Dose Route Frequency    [Held by provider] atorvastatin  20 mg oral Nightly    budesonide  0.5 mg nebulization BID    carvedilol  3.125 mg oral BID    clopidogrel  75 mg oral Daily    [Held by provider] donepezil  10 mg oral Nightly    formoterol  20 mcg nebulization BID    ipratropium  0.5 mg nebulization q6h    [Held by provider] mirtazapine  7.5 mg oral Nightly    piperacillin-tazobactam  3.375 g intravenous q8h    sennosides-docusate sodium  2 tablet oral BID    tamsulosin  0.4 mg oral Nightly     PRN medications   Medication    acetaminophen    Or    acetaminophen    Or    acetaminophen    albuterol    heparin    ondansetron    Or    ondansetron    oxygen     Continuous Medications   Medication Dose Last Rate    heparin  0-4,500 Units/hr Stopped (12/02/24 1034)    lactated Ringer's  100 mL/hr 100 mL/hr (12/02/24 1119)       Outpatient Medications:  Current Outpatient Medications   Medication Instructions    albuterol 90 mcg/actuation inhaler 2 puffs, inhalation, Every 6 hours PRN    atorvastatin (LIPITOR) 20 mg, oral, Daily    budesonide-formoteroL  (Symbicort) 160-4.5 mcg/actuation inhaler 2 puffs, inhalation, 2 times daily RT    carvedilol (COREG) 3.125 mg, oral, 2 times daily    clopidogrel (PLAVIX) 75 mg, oral, Daily    donepezil (ARICEPT) 10 mg, oral, Nightly    mirtazapine (REMERON) 7.5 mg, oral, Nightly    tamsulosin (FLOMAX) 0.4 mg, oral, Nightly    tiotropium (Spiriva) 18 mcg inhalation capsule 1 capsule, inhalation, Daily RT       Last Recorded Vitals:  Vitals:    12/01/24 2359 12/02/24 0106 12/02/24 0440 12/02/24 0800   BP: 96/62  100/76 131/73   BP Location: Left arm  Left arm Left arm   Patient Position: Lying  Lying Lying   Pulse: 107  (!) 113 (!) 112   Resp: 16  16 16   Temp: 36.8 °C (98.2 °F)  36.1 °C (97 °F) 36.3 °C (97.3 °F)   TempSrc: Temporal  Temporal Temporal   SpO2: 100% 98%     Weight:       Height:         Last I/O:  I/O last 3 completed shifts:  In: 114.3 (2.2 mL/kg) [I.V.:114.3 (2.2 mL/kg)]  Out: - (0 mL/kg)   Weight: 50.8 kg     Physical Exam  Vitals reviewed.   Constitutional:       Appearance: He is ill-appearing.   HENT:      Head:      Comments: Dry mucous membranes.  Multiple abrasions of the face.  Eyes:      Pupils: Pupils are equal, round, and reactive to light.   Neck:      Vascular: No JVD.      Comments: Flat jugular venous pulse  Cardiovascular:      Rate and Rhythm: Regular rhythm. Tachycardia present.      Pulses: Normal pulses.      Heart sounds: No murmur heard.     No gallop.   Pulmonary:      Effort: No respiratory distress.      Breath sounds: No wheezing or rales.   Abdominal:      General: Abdomen is flat. There is no distension.      Palpations: Abdomen is soft.   Musculoskeletal:         General: No swelling.      Right lower leg: No edema.      Left lower leg: No edema.   Neurological:      General: No focal deficit present.      Mental Status: He is unresponsive.   Psychiatric:         Mood and Affect: Mood normal.            Last Labs:  CBC - 12/2/2024:  5:50 AM  18.5 13.7 155    45.4      [facility] - 12/2/2024:   5:50 AM  8.7 7.0 126 --- 1.3   _ 3.9 43 75      Troponin I, High Sensitivity   Date/Time Value Ref Range Status   12/02/2024 10:36 AM 84 (HH) 0 - 20 ng/L Final     Comment:     Previous result verified on 12/1/2024 1305 on specimen/case [id number] called with component TRPHS for procedure Troponin I, High Sensitivity with value 113 ng/L.   12/01/2024 05:21  (HH) 0 - 20 ng/L Final     Comment:     Previous result verified on 12/1/2024 1305 on specimen/case [id number] called with component TRPHS for procedure Troponin I, High Sensitivity with value 113 ng/L.   12/01/2024 02:45  (HH) 0 - 20 ng/L Final     Comment:     Previous result verified on 12/1/2024 1305 on specimen/case [id number] called with component TRPHS for procedure Troponin I, High Sensitivity with value 113 ng/L.     BNP   Date/Time Value Ref Range Status   12/02/2024 10:36  (H) 0 - 99 pg/mL Final   12/01/2024 12:34 PM 1,067 (H) 0 - 99 pg/mL Final     Hemoglobin A1C   Date/Time Value Ref Range Status   07/16/2024 12:07 PM 5.5 4.3 - 5.6 % Final     Comment:     American Diabetes Association guidelines indicate that patients with HgbA1c in the range 5.7-6.4% are at increased risk for development of diabetes, and intervention by lifestyle modification may be beneficial. HgbA1c greater or equal to 6.5% is considered diagnostic of diabetes.   09/12/2022 10:17 AM 5.7 (H) 4.3 - 5.6 % Final     Comment:     American Diabetes Association guidelines indicate that patients with HgbA1c in the range 5.7-6.4% are at increased risk for development of diabetes, and intervention by lifestyle modification may be beneficial. HgbA1c greater or equal to 6.5% is considered diagnostic of diabetes.       EKG: Sinus tachycardia with right bundle branch block.      Andrzej Mae MD

## 2024-12-02 NOTE — CARE PLAN
Problem: Pain - Adult  Goal: Verbalizes/displays adequate comfort level or baseline comfort level  Outcome: Progressing     Problem: Safety - Adult  Goal: Free from fall injury  Outcome: Progressing     Problem: Discharge Planning  Goal: Discharge to home or other facility with appropriate resources  Outcome: Progressing     Problem: Chronic Conditions and Co-morbidities  Goal: Patient's chronic conditions and co-morbidity symptoms are monitored and maintained or improved  Outcome: Progressing     Problem: Fall/Injury  Goal: Not fall by end of shift  Outcome: Progressing  Goal: Be free from injury by end of the shift  Outcome: Progressing  Goal: Verbalize understanding of personal risk factors for fall in the hospital  Outcome: Progressing  Goal: Verbalize understanding of risk factor reduction measures to prevent injury from fall in the home  Outcome: Progressing  Goal: Use assistive devices by end of the shift  Outcome: Progressing  Goal: Pace activities to prevent fatigue by end of the shift  Outcome: Progressing     Problem: Skin  Goal: Decreased wound size/increased tissue granulation at next dressing change  Outcome: Progressing  Goal: Participates in plan/prevention/treatment measures  Outcome: Progressing  Goal: Prevent/manage excess moisture  Outcome: Progressing  Goal: Prevent/minimize sheer/friction injuries  Outcome: Progressing  Flowsheets (Taken 12/2/2024 [id number])  Prevent/minimize sheer/friction injuries:   Turn/reposition every 2 hours/use positioning/transfer devices   HOB 30 degrees or less   Use pull sheet  Goal: Promote/optimize nutrition  Outcome: Progressing  Goal: Promote skin healing  Outcome: Progressing   The patient's goals for the shift include      The clinical goals for the shift include Pt will remain free from fall/injury

## 2024-12-02 NOTE — H&P
History Of Present Illness  H&P obtained by chart review and daughter at bedside d/t patient confusion.    Lorne Avina is a 84 y.o. male   PMH; HTN, HLD, infrarenal AAA, Hx of SVT s/p ablation, CVA with residual left-sided weakness, chronic hypoxic respiratory failure 2/2 COPD on 2 L NC prn, BPH and mild cognitive impairment  Patient presented to ED after being found by daughter at home.  Believed to be down for approximately 3 days. Patient reportedly was found to be wedged between his bed and nightstand, incontinent of urine. Upon discovery patient's daughter called 911, and patient was sent to [facility].     Upon arrival to the emergency room patient remained confused A&O x1, to self, limited ability to follow commands. A&A x 1 to self.  Patient was noted to be hypotensive 80s over 50s and tachycardic with a heart rate in the 120s with tachypnea with respiratory rate in the high 20s. Treated with 2 L normal saline pressure improved and stabilized.  Initially on nonrebreather successfully weaned down to 2 L nasal cannula.  Given one-time dose of IV vancomycin and Zosyn. Lab work significant for leukocytosis, afebrile, elevated CK level of 2758, BNP 1067, elevated troponins of 137 and 127, D-dimer of 15,893, urine tox and alcohol tox negative. Urine significant for turbid appearance but negative for UTI.     CT of cervical spine, head, lumbar spine, thoracic spine negative.  X-ray of the femur pelvis ribs and tib-fib without significant findings.  CT angio positive for Single pulmonary embolism involving the right interlobar pulmonary artery extending into the anterobasilar segment.  Minimal embolic burden, no evidence of right heart strain.  Additional imaging reviewed without significant findings    At the time of my exam patient remained confused, limited ability to follow commands. On 2 L nasal cannula with an O2 sat of 94%, remained tachycardic on telemetry.  Patient was admitted for further Medical  management and evaluation.     Resides alone at home. He previously declined skilled nursing facility or assisted living placement.    Past Medical History  He has a past medical history of AAA (abdominal aortic aneurysm) (CMS-HCC), BPH (benign prostatic hyperplasia), Cognitive impairment, COPD (chronic obstructive pulmonary disease) (Multi), ETOH abuse, Falls, GERD (gastroesophageal reflux disease), HLD (hyperlipidemia), Hypertension, Non-compliance, Pericardial effusion ([facility]), Pulmonary embolism, Pulmonary nodules, Stroke (Multi), and SVT (supraventricular tachycardia) (CMS-HCC).    Surgical History  He has a past surgical history that includes Cervical spine surgery and Carotid endarterectomy.     Social History  He reports that he has quit smoking. His smoking use included cigarettes. He has never used smokeless tobacco. He reports current alcohol use. He reports that he does not use drugs.    Family History  No family history on file.     Allergies  Patient has no known allergies.    Review of Systems   Reason unable to perform ROS: Altered mental status.     Physical exam:    GENERAL: unresponsive.   SKIN: Skin color, texture, turgor normal. No rashes or lesions.  EYES: PERRLA, EOMI  HEENT: Head: Normocephalic  Neck: supple and no adenopathy  LUNGS: Lungs clear to auscultation.   CARDIAC: Normal S1 and S2; no rubs, murmurs, or gallops  ABDOMEN: Abdomen soft, non-tender.   EXTREMITIES: No ulcers, Extremities normal.   NEURO: unresponsive.     Last Recorded Vitals  /73 (BP Location: Left arm, Patient Position: Lying)   Pulse (!) 112   Temp 36.3 °C (97.3 °F) (Temporal)   Resp 16   Wt 50.8 kg (112 lb 1.6 oz)   SpO2 98%     Relevant Results  Scheduled medications  [Held by provider] atorvastatin, 20 mg, oral, Nightly  budesonide, 0.5 mg, nebulization, BID  carvedilol, 3.125 mg, oral, BID  clopidogrel, 75 mg, oral, Daily  [Held by provider] donepezil, 10 mg, oral, Nightly  formoterol, 20 mcg,  nebulization, BID  ipratropium, 0.5 mg, nebulization, q6h  [Held by provider] mirtazapine, 7.5 mg, oral, Nightly  piperacillin-tazobactam, 3.375 g, intravenous, q8h  sennosides-docusate sodium, 2 tablet, oral, BID  tamsulosin, 0.4 mg, oral, Nightly      Continuous medications  heparin, 0-4,500 Units/hr, Last Rate: Stopped (12/02/24 1034)  lactated Ringer's, 100 mL/hr, Last Rate: 100 mL/hr (12/02/24 1119)      PRN medications  Results for orders placed or performed during the hospital encounter of 12/01/24 (from the past 24 hours)   Troponin I, High Sensitivity   Result Value Ref Range    Troponin I, High Sensitivity 137 (HH) 0 - 20 ng/L   Urinalysis with Reflex Culture and Microscopic   Result Value Ref Range    Color, Urine Yellow Light-Yellow, Yellow, Dark-Yellow    Appearance, Urine Turbid (N) Clear    Specific Gravity, Urine >1.050 (N) 1.005 - 1.035    pH, Urine 6.0 5.0, 5.5, 6.0, 6.5, 7.0, 7.5, 8.0    Protein, Urine 50 (1+) (A) NEGATIVE, 10 (TRACE), 20 (TRACE) mg/dL    Glucose, Urine Normal Normal mg/dL    Blood, Urine 0.2 (2+) (A) NEGATIVE    Ketones, Urine 20 (1+) (A) NEGATIVE mg/dL    Bilirubin, Urine NEGATIVE NEGATIVE    Urobilinogen, Urine Normal Normal mg/dL    Nitrite, Urine NEGATIVE NEGATIVE    Leukocyte Esterase, Urine NEGATIVE NEGATIVE   Extra Urine Gray Tube   Result Value Ref Range    Extra Tube Hold for add-ons.    Urinalysis Microscopic   Result Value Ref Range    WBC, Urine 1-5 1-5, NONE /HPF    RBC, Urine 11-20 (A) NONE, 1-2, 3-5 /HPF    Squamous Epithelial Cells, Urine 1-9 (SPARSE) Reference range not established. /HPF    Mucus, Urine 4+ Reference range not established. /LPF    Hyaline Casts, Urine 1+ (A) NONE /LPF   Troponin I, High Sensitivity   Result Value Ref Range    Troponin I, High Sensitivity 127 (HH) 0 - 20 ng/L   Creatine Kinase   Result Value Ref Range    Creatine Kinase 2,745 (H) 0 - 325 U/L   Heparin Assay, UFH   Result Value Ref Range    Heparin Unfractionated 0.5 See Comment  Below for Therapeutic Ranges IU/mL   Heparin Assay, UFH   Result Value Ref Range    Heparin Unfractionated 0.3 See Comment Below for Therapeutic Ranges IU/mL   CBC   Result Value Ref Range    WBC 18.5 (H) 4.4 - 11.3 x10*3/uL    nRBC 0.0 0.0 - 0.0 /100 WBCs    RBC 4.57 4.50 - 5.90 x10*6/uL    Hemoglobin 13.7 13.5 - 17.5 g/dL    Hematocrit 45.4 41.0 - 52.0 %    MCV 99 80 - 100 fL    MCH 30.0 26.0 - 34.0 pg    MCHC 30.2 (L) 32.0 - 36.0 g/dL    RDW 14.0 11.5 - 14.5 %    Platelets 155 150 - 450 x10*3/uL   Basic metabolic panel   Result Value Ref Range    Glucose 83 74 - 99 mg/dL    Sodium 146 (H) 136 - 145 mmol/L    Potassium 4.1 3.5 - 5.3 mmol/L    Chloride 106 98 - 107 mmol/L    Bicarbonate 28 21 - 32 mmol/L    Anion Gap 16 10 - 20 mmol/L    Urea Nitrogen 51 (H) 6 - 23 mg/dL    Creatinine 0.76 0.50 - 1.30 mg/dL    eGFR 90 >60 mL/min/1.73m*2    Calcium 8.7 8.6 - 10.3 mg/dL   Creatine Kinase   Result Value Ref Range    Creatine Kinase 1,533 (H) 0 - 325 U/L   Heparin Assay, UFH   Result Value Ref Range    Heparin Unfractionated 0.3 See Comment Below for Therapeutic Ranges IU/mL   Ammonia   Result Value Ref Range    Ammonia 43 16 - 53 umol/L   Troponin I, High Sensitivity   Result Value Ref Range    Troponin I, High Sensitivity 84 (HH) 0 - 20 ng/L   B-Type Natriuretic Peptide   Result Value Ref Range     (H) 0 - 99 pg/mL     CT angio chest for pulmonary embolism    Result Date: 12/1/2024  STUDY: CT Angiogram of the Chest; 12/01/2024 4:00 pm INDICATION: Elevated D-Dimer, down for 4 days. COMPARISON: CT CAP 12/01/2024. ACCESSION NUMBER(S): [id number] ORDERING CLINICIAN: LINDA WINTERS TECHNIQUE:  CTA of the chest was performed with intravenous contrast. Images are reviewed and processed at a workstation according to the CT angiogram protocol with 3-D and/or MIP post processing imaging generated.  Automated mA/kV exposure control was utilized and patient examination was performed in strict accordance with principles  anitra GASPAR. FINDINGS: Pulmonary arteries are adequately opacified and there is a single pulmonary embolism involving the right interlobar pulmonary artery extending into the anterobasilar segment.  The thoracic aorta is normal in course and caliber without dissection or aneurysm. Small pericardial effusion.  Severe coronary artery calcifications noted.  Thoracic lymph nodes are not enlarged. There is no pleural effusion, pleural thickening, or pneumothorax. The airways are patent. Emphysematous changes again noted.  Again noted are approximately 3 nodular lesions involving the left upper lobe, left lower lobe and right lower lobe unchanged from exam from the same day measuring up to approximately 2 cm involving the left upper lobe and left lower lobe Upper abdomen demonstrates no acute pathology. There are no acute fractures.  No suspicious bony lesions.    1. Single pulmonary embolism involving the right interlobar pulmonary artery extending into the anterobasilar segment.  Minimal embolic burden.  No evidence of right heart strain or pulmonary infarct. 2. Again noted are approximately 3 nodular lesions involving the left upper lobe, left lower lobe and right lower lobe unchanged from exam from the same day measuring up to approximately 2 cm involving the left upper lobe and left lower lobe.  Findings concerning for possible neoplasm.  Recommend further evaluation with PET/CT. 3. Emphysema. 4. Small pericardial effusion. 5. Severe coronary artery calcifications. Findings discussed with and acknowledged by eJlly Arana 4:28 PM Signed by Bong Nixon MD    XR tibia fibula bilateral 2 views    Result Date: 12/1/2024  STUDY: Bilateral Tibia and Fibula Radiographs; 12/1/2024 2:00 PM INDICATION: Fall. COMPARISON: None Available. ACCESSION NUMBER(S): [id number] ORDERING CLINICIAN: ROSANA BRUCE TECHNIQUE:  Two view(s) of the right tibia/fibula (four images) and two view(s) of the left tibia/fibula (four  images). FINDINGS:  RIGHT TIBIA AND FIBULA:  There is no displaced fracture.  The alignment is anatomic.  No soft tissue abnormality is seen. LEFT TIBIA AND FIBULA:  There is no displaced fracture.  The alignment is anatomic.  No soft tissue abnormality is seen.    No acute osseous abnormality. Signed by Lorne Victor MD    XR femur 2 VW bilateral    Result Date: 12/1/2024  STUDY: Bilateral Femur Radiographs; 12/1/2024 2:00 PM INDICATION: Fall. COMPARISON: None Available. ACCESSION NUMBER(S): [id number] ORDERING CLINICIAN: ROSANA BRUCE TECHNIQUE:  Two view(s) of the right femur (four images) and two view(s) of the left femur (four images). FINDINGS:  Mild generalized osseous demineralization is noted. Right:  There is no displaced fracture.  The alignment is anatomic. No soft tissue abnormality is seen. Left:  There is no displaced fracture.  The alignment is anatomic.  No soft tissue abnormality is seen. Excreted contrast is noted in the urinary bladder.  Peripheral vascular calcifications are seen bilaterally.    No definite acute osseous abnormality identified. Signed by Aiden Barrow    XR ribs 2 views left w chest pa or ap    Result Date: 12/1/2024  STUDY: Left Rib and Chest Radiographs INDICATION: Fall. COMPARISON: None Available. ACCESSION NUMBER(S): [id number] ORDERING CLINICIAN: ROSANA BRUCE TECHNIQUE:  Frontal chest and five view(s) of the left ribs. FINDINGS:  CARDIOMEDIASTINAL SILHOUETTE: Cardiomediastinal silhouette is normal in size and configuration. Calcified plaque is seen in the aorta.  LUNGS: Lungs are clear.  ABDOMEN: No remarkable upper abdominal findings.  Excreted contrast is seen in the collecting systems. LEFT RIBS:  There is no acute rib fracture. OTHER VISUALIZED BONES: No acute osseous changes.  Cervical fusion hardware is partially visualized.  Mild levoconvex curvature is seen of the mid thoracic spine.    No definite acute cardiopulmonary disease. No definite acute left rib  fracture identified. Signed by Aiden Barrow    XR pelvis 1-2 views    Result Date: 12/1/2024  STUDY: Pelvis Radiographs; 12/1/2024 2:00 PM INDICATION: Fall. COMPARISON: None Available. ACCESSION NUMBER(S): [id number] ORDERING CLINICIAN: ROSANA BRUCE TECHNIQUE:  One view(s) of the pelvis. FINDINGS:  The pelvic ring is intact.  There is no acute fracture.  Mild degenerative changes of both hips.    No acute osseous abnormality. Signed by Bong Nixon MD    CT chest abdomen pelvis w IV contrast    Result Date: 12/1/2024  STUDY: CT Chest, Abdomen, and Pelvis with IV Contrast, CT Thoracic Spine and Lumbar Spine without IV Contrast; 12/01/2024 1:20 PM INDICATION: Fall - on blood thinners. COMPARISON: CT cervical spine 10/12/2023. ACCESSION NUMBER(S): [id number], [id number], [id number] ORDERING CLINICIAN: LINDA WINTERS TECHNIQUE: CT of the chest, abdomen, and pelvis was performed.  Contiguous axial images were obtained at 3 mm slice thickness through the chest, abdomen, and pelvis.  Coronal and sagittal reconstructions at 3 mm slice thickness were performed.  Omnipaque 350 60 mL was administered intravenously.  Please note that spinal images were generated from the original CT abdomen and pelvis imaging. FINDINGS: CHEST: MEDIASTINUM: The heart is normal in size without pericardial effusion.  Aortic valvular and coronary artery calcifications are visualized.  Central vascular structures opacify normally.  LUNGS/PLEURA: There is no pleural effusion, pleural thickening, or pneumothorax. The airways are patent. The lungs demonstrate emphysematous changes bilaterally with a predominantly centrilobular distribution in an upper lobe predilection.  There is a spiculated lesion located within the left lower lobe measuring 1.8 cm in diameter (204-156) .  An additional ill-defined nodular density is noted within the left upper lobe which measures 1.8 cm in diameter (204-160) .  There is an ill-defined groundglass  nodular density located within the right lower lobe measuring 9 mm in diameter (204-188).  The graft there is biapical pleural-parenchymal scarring noted. LYMPH NODES: There is no evidence of significant thoracic lymphadenopathy by CT size criteria.. ABDOMEN:  LIVER: No hepatomegaly.  Smooth surface contour.  Normal attenuation.  There are subcentimeter hypoattenuating lesions noted within the liver. Largest measures 9 mm in diameter (201-102).  These are too small to characterize by size criteria.  There is an additional hypodense lesion located within left hepatic lobe measuring 1.4 cm in diameter (201-94) which likely represents a cyst or hemangioma.  BILE DUCTS: No intrahepatic or extrahepatic biliary ductal dilatation.  GALLBLADDER: The gallbladder is visualized.  Radiopaque calculi are noted. STOMACH: No abnormalities identified.  PANCREAS: No masses or ductal dilatation.  SPLEEN: There are calcifications noted within the spleen and liver which may represent sequelae of previous granulomatous disease.  ADRENAL GLANDS: No thickening or nodules.  KIDNEYS AND URETERS: Kidneys are normal in size and location.  No renal or ureteral calculi.  There are bilateral renal cysts noted.  The largest within the right kidney measures proximally 4.9 cm in diameter.  No hydronephrosis or nephrolithiasis.  PELVIS:  BLADDER: No abnormalities identified.  REPRODUCTIVE ORGANS: No abnormalities identified.  BOWEL: No abnormalities identified.  VESSELS: There are severe calcific atherosclerotic changes of the abdominal aorta and iliac vessels.  There is an infrarenal abdominal aortic aneurysm noted containing mural thrombus which measures up to 4.1 cm in diameter (201-138).  The visceral vessels enhance normally.  The inferior mesenteric artery is not clearly visualized..  There are severe calcific atherosclerotic changes of the iliac vessels. Calcific atherosclerotic changes of the internal iliac arteries are noted bilaterally.   PERITONEUM/RETROPERITONEUM/LYMPH NODES: No free fluid.  No pneumoperitoneum. There is no evidence of significant abdominal or pelvic lymphadenopathy by CT size criteria.  ABDOMINAL WALL: There is colonic calculus is noted without CT evidence of diverticulitis.  The appendix is not clearly visualized.  There is no definite evidence of bowel wall thickening or dilatation to suggest mechanical obstruction.  SOFT TISSUES: There are infiltrative changes and ill-defined fluid noted overlying the subcutaneous soft tissues of the left posterior pelvis and hip (201-179) sees.  BONES: No acute fracture or aggressive osseous lesion.  Status post anterior fusion of the lower cervical spine.  THORACIC SPINE: The alignment is anatomic.  There is no fracture or traumatic subluxation. The vertebral body heights are well maintained.  There are multilevel degenerative changes of the mid to distal thoracic spine with anterior marginal spurring noted..  No significant central canal stenosis is demonstrated.  The neural foramina are patent throughout.  The paravertebral soft tissues are within normal limits. LUMBAR SPINE: The alignment is anatomic.  There is no fracture or traumatic subluxation. The vertebral body heights are well maintained.  There is multilevel degenerative disc disease of the lumbar spine with changes most prominently noted at the L1-L2 and L2-L3 levels with associated anterior osteophytosis.  No definite spondylolisthesis..  No significant central canal stenosis is demonstrated.  There is some mild bilateral neuroforamina narrowing noted at the L5-S1 level.  Mild left narrowing with associated facet hypertrophy may be noted at the L4-L5 level..  The paravertebral soft tissues are within normal limits.    1.  No definite CT evidence of acute thoracic vascular injury. 2.  There is a solid spiculated lesion located within the left lower lobe which measures 1.8 cm in diameter.  This is suspicious for possible neoplasm.   Additional nodular densities are noted within the left upper lobe and right lower lobe as described above.  Recommend either a PET/CT for further evaluation or possible tissue sampling. 3.  No focal pulmonary consolidation, pleural effusions or pneumothorax. 4.  No definite CT evidence of abdominal or pelvic visceral/vascular injury. 5.  No intra-abdominal/pelvic fluid collections pneumoperitoneum. 6.  No acute thoracic or lumbar vertebral body compression/fracture. 7.  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 8. other findings as stated above. Signed by Terrence Borjas MD    CT thoracic spine wo IV contrast    Result Date: 12/1/2024  STUDY: CT Chest, Abdomen, and Pelvis with IV Contrast, CT Thoracic Spine and Lumbar Spine without IV Contrast; 12/01/2024 1:20 PM INDICATION: Fall - on blood thinners. COMPARISON: CT cervical spine 10/12/2023. ACCESSION NUMBER(S): [id number], [id number], [id number] ORDERING CLINICIAN: LINDA WINTERS TECHNIQUE: CT of the chest, abdomen, and pelvis was performed.  Contiguous axial images were obtained at 3 mm slice thickness through the chest, abdomen, and pelvis.  Coronal and sagittal reconstructions at 3 mm slice thickness were performed.  Omnipaque 350 60 mL was administered intravenously.  Please note that spinal images were generated from the original CT abdomen and pelvis imaging. FINDINGS: CHEST: MEDIASTINUM: The heart is normal in size without pericardial effusion.  Aortic valvular and coronary artery calcifications are visualized.  Central vascular structures opacify normally.  LUNGS/PLEURA: There is no pleural effusion, pleural thickening, or pneumothorax. The airways are patent. The lungs demonstrate emphysematous changes bilaterally with a predominantly centrilobular distribution in an upper lobe predilection.  There is a spiculated lesion located within the left lower lobe measuring 1.8 cm in diameter  (204-156) .  An additional ill-defined nodular density is noted within the left upper lobe which measures 1.8 cm in diameter (204-160) .  There is an ill-defined groundglass nodular density located within the right lower lobe measuring 9 mm in diameter (204-188).  The graft there is biapical pleural-parenchymal scarring noted. LYMPH NODES: There is no evidence of significant thoracic lymphadenopathy by CT size criteria.. ABDOMEN:  LIVER: No hepatomegaly.  Smooth surface contour.  Normal attenuation.  There are subcentimeter hypoattenuating lesions noted within the liver. Largest measures 9 mm in diameter (201-102).  These are too small to characterize by size criteria.  There is an additional hypodense lesion located within left hepatic lobe measuring 1.4 cm in diameter (201-94) which likely represents a cyst or hemangioma.  BILE DUCTS: No intrahepatic or extrahepatic biliary ductal dilatation.  GALLBLADDER: The gallbladder is visualized.  Radiopaque calculi are noted. STOMACH: No abnormalities identified.  PANCREAS: No masses or ductal dilatation.  SPLEEN: There are calcifications noted within the spleen and liver which may represent sequelae of previous granulomatous disease.  ADRENAL GLANDS: No thickening or nodules.  KIDNEYS AND URETERS: Kidneys are normal in size and location.  No renal or ureteral calculi.  There are bilateral renal cysts noted.  The largest within the right kidney measures proximally 4.9 cm in diameter.  No hydronephrosis or nephrolithiasis.  PELVIS:  BLADDER: No abnormalities identified.  REPRODUCTIVE ORGANS: No abnormalities identified.  BOWEL: No abnormalities identified.  VESSELS: There are severe calcific atherosclerotic changes of the abdominal aorta and iliac vessels.  There is an infrarenal abdominal aortic aneurysm noted containing mural thrombus which measures up to 4.1 cm in diameter (201-138).  The visceral vessels enhance normally.  The inferior mesenteric artery is not clearly  visualized..  There are severe calcific atherosclerotic changes of the iliac vessels. Calcific atherosclerotic changes of the internal iliac arteries are noted bilaterally.  PERITONEUM/RETROPERITONEUM/LYMPH NODES: No free fluid.  No pneumoperitoneum. There is no evidence of significant abdominal or pelvic lymphadenopathy by CT size criteria.  ABDOMINAL WALL: There is colonic calculus is noted without CT evidence of diverticulitis.  The appendix is not clearly visualized.  There is no definite evidence of bowel wall thickening or dilatation to suggest mechanical obstruction.  SOFT TISSUES: There are infiltrative changes and ill-defined fluid noted overlying the subcutaneous soft tissues of the left posterior pelvis and hip (201-179) sees.  BONES: No acute fracture or aggressive osseous lesion.  Status post anterior fusion of the lower cervical spine.  THORACIC SPINE: The alignment is anatomic.  There is no fracture or traumatic subluxation. The vertebral body heights are well maintained.  There are multilevel degenerative changes of the mid to distal thoracic spine with anterior marginal spurring noted..  No significant central canal stenosis is demonstrated.  The neural foramina are patent throughout.  The paravertebral soft tissues are within normal limits. LUMBAR SPINE: The alignment is anatomic.  There is no fracture or traumatic subluxation. The vertebral body heights are well maintained.  There is multilevel degenerative disc disease of the lumbar spine with changes most prominently noted at the L1-L2 and L2-L3 levels with associated anterior osteophytosis.  No definite spondylolisthesis..  No significant central canal stenosis is demonstrated.  There is some mild bilateral neuroforamina narrowing noted at the L5-S1 level.  Mild left narrowing with associated facet hypertrophy may be noted at the L4-L5 level..  The paravertebral soft tissues are within normal limits.    1.  No definite CT evidence of acute  thoracic vascular injury. 2.  There is a solid spiculated lesion located within the left lower lobe which measures 1.8 cm in diameter.  This is suspicious for possible neoplasm.  Additional nodular densities are noted within the left upper lobe and right lower lobe as described above.  Recommend either a PET/CT for further evaluation or possible tissue sampling. 3.  No focal pulmonary consolidation, pleural effusions or pneumothorax. 4.  No definite CT evidence of abdominal or pelvic visceral/vascular injury. 5.  No intra-abdominal/pelvic fluid collections pneumoperitoneum. 6.  No acute thoracic or lumbar vertebral body compression/fracture. 7.  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 8. other findings as stated above. Signed by Terrence Borjas MD    CT lumbar spine wo IV contrast    Result Date: 12/1/2024  STUDY: CT Chest, Abdomen, and Pelvis with IV Contrast, CT Thoracic Spine and Lumbar Spine without IV Contrast; 12/01/2024 1:20 PM INDICATION: Fall - on blood thinners. COMPARISON: CT cervical spine 10/12/2023. ACCESSION NUMBER(S): [id number], [id number], [id number] ORDERING CLINICIAN: LINDA WINTERS TECHNIQUE: CT of the chest, abdomen, and pelvis was performed.  Contiguous axial images were obtained at 3 mm slice thickness through the chest, abdomen, and pelvis.  Coronal and sagittal reconstructions at 3 mm slice thickness were performed.  Omnipaque 350 60 mL was administered intravenously.  Please note that spinal images were generated from the original CT abdomen and pelvis imaging. FINDINGS: CHEST: MEDIASTINUM: The heart is normal in size without pericardial effusion.  Aortic valvular and coronary artery calcifications are visualized.  Central vascular structures opacify normally.  LUNGS/PLEURA: There is no pleural effusion, pleural thickening, or pneumothorax. The airways are patent. The lungs demonstrate emphysematous changes bilaterally with  a predominantly centrilobular distribution in an upper lobe predilection.  There is a spiculated lesion located within the left lower lobe measuring 1.8 cm in diameter (204-156) .  An additional ill-defined nodular density is noted within the left upper lobe which measures 1.8 cm in diameter (204-160) .  There is an ill-defined groundglass nodular density located within the right lower lobe measuring 9 mm in diameter (204-188).  The graft there is biapical pleural-parenchymal scarring noted. LYMPH NODES: There is no evidence of significant thoracic lymphadenopathy by CT size criteria.. ABDOMEN:  LIVER: No hepatomegaly.  Smooth surface contour.  Normal attenuation.  There are subcentimeter hypoattenuating lesions noted within the liver. Largest measures 9 mm in diameter (201-102).  These are too small to characterize by size criteria.  There is an additional hypodense lesion located within left hepatic lobe measuring 1.4 cm in diameter (201-94) which likely represents a cyst or hemangioma.  BILE DUCTS: No intrahepatic or extrahepatic biliary ductal dilatation.  GALLBLADDER: The gallbladder is visualized.  Radiopaque calculi are noted. STOMACH: No abnormalities identified.  PANCREAS: No masses or ductal dilatation.  SPLEEN: There are calcifications noted within the spleen and liver which may represent sequelae of previous granulomatous disease.  ADRENAL GLANDS: No thickening or nodules.  KIDNEYS AND URETERS: Kidneys are normal in size and location.  No renal or ureteral calculi.  There are bilateral renal cysts noted.  The largest within the right kidney measures proximally 4.9 cm in diameter.  No hydronephrosis or nephrolithiasis.  PELVIS:  BLADDER: No abnormalities identified.  REPRODUCTIVE ORGANS: No abnormalities identified.  BOWEL: No abnormalities identified.  VESSELS: There are severe calcific atherosclerotic changes of the abdominal aorta and iliac vessels.  There is an infrarenal abdominal aortic aneurysm  noted containing mural thrombus which measures up to 4.1 cm in diameter (201-138).  The visceral vessels enhance normally.  The inferior mesenteric artery is not clearly visualized..  There are severe calcific atherosclerotic changes of the iliac vessels. Calcific atherosclerotic changes of the internal iliac arteries are noted bilaterally.  PERITONEUM/RETROPERITONEUM/LYMPH NODES: No free fluid.  No pneumoperitoneum. There is no evidence of significant abdominal or pelvic lymphadenopathy by CT size criteria.  ABDOMINAL WALL: There is colonic calculus is noted without CT evidence of diverticulitis.  The appendix is not clearly visualized.  There is no definite evidence of bowel wall thickening or dilatation to suggest mechanical obstruction.  SOFT TISSUES: There are infiltrative changes and ill-defined fluid noted overlying the subcutaneous soft tissues of the left posterior pelvis and hip (201-179) sees.  BONES: No acute fracture or aggressive osseous lesion.  Status post anterior fusion of the lower cervical spine.  THORACIC SPINE: The alignment is anatomic.  There is no fracture or traumatic subluxation. The vertebral body heights are well maintained.  There are multilevel degenerative changes of the mid to distal thoracic spine with anterior marginal spurring noted..  No significant central canal stenosis is demonstrated.  The neural foramina are patent throughout.  The paravertebral soft tissues are within normal limits. LUMBAR SPINE: The alignment is anatomic.  There is no fracture or traumatic subluxation. The vertebral body heights are well maintained.  There is multilevel degenerative disc disease of the lumbar spine with changes most prominently noted at the L1-L2 and L2-L3 levels with associated anterior osteophytosis.  No definite spondylolisthesis..  No significant central canal stenosis is demonstrated.  There is some mild bilateral neuroforamina narrowing noted at the L5-S1 level.  Mild left narrowing  with associated facet hypertrophy may be noted at the L4-L5 level..  The paravertebral soft tissues are within normal limits.    1.  No definite CT evidence of acute thoracic vascular injury. 2.  There is a solid spiculated lesion located within the left lower lobe which measures 1.8 cm in diameter.  This is suspicious for possible neoplasm.  Additional nodular densities are noted within the left upper lobe and right lower lobe as described above.  Recommend either a PET/CT for further evaluation or possible tissue sampling. 3.  No focal pulmonary consolidation, pleural effusions or pneumothorax. 4.  No definite CT evidence of abdominal or pelvic visceral/vascular injury. 5.  No intra-abdominal/pelvic fluid collections pneumoperitoneum. 6.  No acute thoracic or lumbar vertebral body compression/fracture. 7.  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 8. other findings as stated above. Signed by Terrence Borjas MD    CT head wo IV contrast    Result Date: 12/1/2024  Interpreted By:  Hai Kruger, STUDY: CT HEAD WO IV CONTRAST; ;  12/1/2024 1:10 pm   INDICATION: Signs/Symptoms:fall on thinners.   COMPARISON: 10/12/2023   ACCESSION NUMBER(S): [id number]   ORDERING CLINICIAN: LINDA WINTERS   TECHNIQUE: Contiguous unenhanced axial CT sections are performed from the skull base to the vertex.   The study is limited by motion degradation.   FINDINGS: There is mucoperiosteal thickening of the ethmoid air cells and maxillary sinuses. A fluid level is identified in the left frontal compartment and small fluid level in the left maxillary sinus. The mastoid air cells are clear. There is right periorbital hematoma and hematoma overlying the frontal calvarium at the midline and to the left of midline. There is some soft tissue swelling also noted over the posterior parietal calvarium. There is no sign of depressed calvarial fracture. The visualized osseous  structures are intact.   There is moderate to severe generalized parenchymal volume loss with enlargement of the cortical sulci and CSF spaces similar to the previous study. There is diffuse hypoattenuation in the cerebral white matter bilaterally compatible with small-vessel ischemia in greater on the right. These findings are also similar to the previous study. 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 These findings are also unchanged. Old infarct is also suspected in the posterior right cerebellum which is stable as well.   There is no sign of parenchymal hematoma or dense extra-axial fluid collection. There is no mass effect or midline shift. The gray matter/white matter distribution is preserved.       Soft tissue hematomas are identified overlying the right orbit, maxilla, and calvarium as described above. There is no acute fracture.   Severe age-related atrophy and small-vessel ischemic changes of the cerebral white matter.   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 These findings have remained unchanged.   No CT evidence of acute intracranial hemorrhage or mass effect.   Mucoperiosteal thickening of the ethmoid and maxillary sinuses. There is fluid level in the left frontal compartment and left maxillary sinus which could reflect acute inflammatory changes. If there is clinical concern for occult facial fracture, CT examination can be performed.     MACRO: None   Signed by: Hai Kruger 12/1/2024 1:29 PM Dictation workstation:   [id number]    CT cervical spine wo IV contrast    Result Date: 12/1/2024  Interpreted By:  Hai Kruger, STUDY: CT CERVICAL SPINE WO IV CONTRAST; ;  12/1/2024 1:10 pm   INDICATION: Signs/Symptoms:fall on thinners.   COMPARISON: 10/12/2023   ACCESSION  NUMBER(S): [id number]   ORDERING CLINICIAN: LINDA WINTERS   TECHNIQUE: Contiguous axial CT sections are performed from the skullbase to the upper thoracic spine and supplemented with coronal and sagittal reformatted images.   FINDINGS: The patient is status post anterior cervical fusion from C4 through C7 with plate and screw fixation. The hardware is intact. There is no lucency surrounding the hardware. The appearance is stable from 10/12/2023.   There is mild levo convexity of the lumbar spine and straightening of the lumbar lordosis. The facet joints align normally.   There is cervical spondylosis with disc space narrowing at C7-T1 and to a lesser extent C3-4. This appearance is also stable.   The cervical vertebral body heights are maintained. The C1 ring is intact. There is no sign of cervical vertebral fracture. There is no bone destruction or aggressive periosteal reaction. No lytic or blastic lesion is detected.   There is multiple bulging disc and marginal osteophyte with multilevel central canal narrowing which is greatest at C5-6 and C6-7. This appearance is also unchanged. There is bilateral multilevel neural foraminal narrowing secondary to facet and uncovertebral arthrosis which is greatest at C3-4 on the right. Multilevel hypertrophic facet arthrosis is most pronounced at C2-3 on the left at C3-4 on the right.   The surrounding soft tissues demonstrate biapical pleural and parenchymal scarring with emphysematous changes. There is no prevertebral soft tissue swelling or retropharyngeal air.       Status post multilevel anterior cervical fusion from C4 through C7. There is no hardware failure.   No acute fracture or subluxation.   Multilevel degenerative changes with central canal and neural foraminal narrowing as detailed above, stable from 10/12/2023.     MACRO: None   Signed by: Hai Kruger 12/1/2024 1:22 PM Dictation workstation:   [id number]      Assessment/Plan     Assessment &  Plan  Pulmonary embolism on right (Multi)    PE (pulmonary thromboembolism) (Multi)    HTN (hypertension)    Incidental pulmonary nodule    Chronic hypoxic respiratory failure, on home oxygen therapy (Multi)    Rhabdomyolysis    Elevated troponin    Elevated brain natriuretic peptide (BNP) level    Leukocytosis    Metabolic encephalopathy    // PE (pulmonary thromboembolism) (Multi)  Supplemental oxygen, goal O2 sat greater than 90%  Continuous telemetry monitoring  Incentive spirometry every hour while awake  Bowel regimen  Tylenol for pain  Zofran as needed for nausea  Continue with close neurovascular monitoring  AC.     // Acute change in mental status.   - CT brain is negative.   - Check ammonia.   - Check lactic acid.   - unable to obtain labs.   - unable to obtain ABGs  - transfer to ICU.   - D/W family at the bedside.      // HTN (hypertension)  Home meds Plavix, Coreg, and Lipitor     // Elevated HDL  Home Lipitor  Heart Healthy diet when cleared to take p.o.     // Incidental pulmonary nodule  Again noted are approximately 3 nodular lesions involving the left upper lobe, left lower lobe and right lower lobe unchanged from exam from the same day measuring up to approximately 2 cm involving the left upper lobe and left lower lobe.  Findings concerning for possible neoplasm.    Recommend further evaluation with PET/CT.     // Chronic hypoxic respiratory failure, on home oxygen therapy (Multi)  Home oxygen PRN 2 L NC   Resume Home inhalers  Monitor oxygen requirements          Obey Jennings MD

## 2024-12-02 NOTE — ED NOTES
85 y/o pt brought to the ER after a family member found him down at home. Pt brought in and had  a c-collar placed. Pt was brought down to CT and Xray. Pt alert to himself. Pts daughter states this is new for him. CT shows pt has a PE, heparin was hung. Pt to be admitted to the floor.     Jarvis West RN  12/01/24 1910

## 2024-12-02 NOTE — CARE PLAN
The patient's goals for the shift include  deferred    The clinical goals for the shift include patient will HDS throughout shift on 12/2.     Over the shift, the patient did not make progress toward the following goals. Barriers to progression include acuteness of illness. Recommendations to address these barriers include continue with plan of care

## 2024-12-03 ENCOUNTER — APPOINTMENT (OUTPATIENT)
Dept: RADIOLOGY | Facility: HOSPITAL | Age: 84
End: 2024-12-03
Payer: MEDICARE

## 2024-12-03 LAB
ALBUMIN SERPL BCP-MCNC: 2.7 G/DL (ref 3.4–5)
ALP SERPL-CCNC: 46 U/L (ref 33–136)
ALT SERPL W P-5'-P-CCNC: 30 U/L (ref 10–52)
AMORPH CRY #/AREA UR COMP ASSIST: ABNORMAL /HPF
ANION GAP SERPL CALC-SCNC: 13 MMOL/L (ref 10–20)
ANION GAP SERPL CALC-SCNC: 8 MMOL/L (ref 10–20)
APPEARANCE UR: ABNORMAL
AST SERPL W P-5'-P-CCNC: 60 U/L (ref 9–39)
BILIRUB SERPL-MCNC: 1.1 MG/DL (ref 0–1.2)
BILIRUB UR STRIP.AUTO-MCNC: NEGATIVE MG/DL
BUN SERPL-MCNC: 39 MG/DL (ref 6–23)
BUN SERPL-MCNC: 51 MG/DL (ref 6–23)
CALCIUM SERPL-MCNC: 8 MG/DL (ref 8.6–10.3)
CALCIUM SERPL-MCNC: 8.3 MG/DL (ref 8.6–10.3)
CHLORIDE SERPL-SCNC: 109 MMOL/L (ref 98–107)
CHLORIDE SERPL-SCNC: 109 MMOL/L (ref 98–107)
CK SERPL-CCNC: 785 U/L (ref 0–325)
CO2 SERPL-SCNC: 29 MMOL/L (ref 21–32)
CO2 SERPL-SCNC: 32 MMOL/L (ref 21–32)
COLOR UR: YELLOW
CREAT SERPL-MCNC: 0.65 MG/DL (ref 0.5–1.3)
CREAT SERPL-MCNC: 0.71 MG/DL (ref 0.5–1.3)
CREAT UR-MCNC: 113.9 MG/DL (ref 20–370)
EGFRCR SERPLBLD CKD-EPI 2021: >90 ML/MIN/1.73M*2
EGFRCR SERPLBLD CKD-EPI 2021: >90 ML/MIN/1.73M*2
ERYTHROCYTE [DISTWIDTH] IN BLOOD BY AUTOMATED COUNT: 13.9 % (ref 11.5–14.5)
GLUCOSE SERPL-MCNC: 92 MG/DL (ref 74–99)
GLUCOSE SERPL-MCNC: 98 MG/DL (ref 74–99)
GLUCOSE UR STRIP.AUTO-MCNC: NORMAL MG/DL
HCT VFR BLD AUTO: 36.8 % (ref 41–52)
HGB BLD-MCNC: 11.4 G/DL (ref 13.5–17.5)
HOLD SPECIMEN: NORMAL
KETONES UR STRIP.AUTO-MCNC: ABNORMAL MG/DL
LACTATE SERPL-SCNC: 1.8 MMOL/L (ref 0.4–2)
LACTATE SERPL-SCNC: 2.8 MMOL/L (ref 0.4–2)
LACTATE SERPL-SCNC: 3.2 MMOL/L (ref 0.4–2)
LEUKOCYTE ESTERASE UR QL STRIP.AUTO: NEGATIVE
MAGNESIUM SERPL-MCNC: 2.24 MG/DL (ref 1.6–2.4)
MCH RBC QN AUTO: 30.2 PG (ref 26–34)
MCHC RBC AUTO-ENTMCNC: 31 G/DL (ref 32–36)
MCV RBC AUTO: 97 FL (ref 80–100)
MUCOUS THREADS #/AREA URNS AUTO: ABNORMAL /LPF
NITRITE UR QL STRIP.AUTO: NEGATIVE
NRBC BLD-RTO: 0 /100 WBCS (ref 0–0)
PH UR STRIP.AUTO: 6 [PH]
PHOSPHATE SERPL-MCNC: 2.9 MG/DL (ref 2.5–4.9)
PLATELET # BLD AUTO: 140 X10*3/UL (ref 150–450)
POTASSIUM SERPL-SCNC: 3.4 MMOL/L (ref 3.5–5.3)
POTASSIUM SERPL-SCNC: 3.7 MMOL/L (ref 3.5–5.3)
PROT SERPL-MCNC: 5 G/DL (ref 6.4–8.2)
PROT UR STRIP.AUTO-MCNC: ABNORMAL MG/DL
PROT UR-ACNC: 31 MG/DL (ref 5–25)
PROT/CREAT UR: 0.27 MG/MG CREAT (ref 0–0.17)
RBC # BLD AUTO: 3.78 X10*6/UL (ref 4.5–5.9)
RBC # UR STRIP.AUTO: NEGATIVE /UL
RBC #/AREA URNS AUTO: ABNORMAL /HPF
SODIUM SERPL-SCNC: 146 MMOL/L (ref 136–145)
SODIUM SERPL-SCNC: 147 MMOL/L (ref 136–145)
SP GR UR STRIP.AUTO: 1.04
UFH PPP CHRO-ACNC: 0.3 IU/ML
UROBILINOGEN UR STRIP.AUTO-MCNC: NORMAL MG/DL
WBC # BLD AUTO: 9.8 X10*3/UL (ref 4.4–11.3)
WBC #/AREA URNS AUTO: ABNORMAL /HPF

## 2024-12-03 PROCEDURE — 2500000004 HC RX 250 GENERAL PHARMACY W/ HCPCS (ALT 636 FOR OP/ED): Performed by: NURSE PRACTITIONER

## 2024-12-03 PROCEDURE — 82610 CYSTATIN C: CPT | Performed by: INTERNAL MEDICINE

## 2024-12-03 PROCEDURE — 83605 ASSAY OF LACTIC ACID: CPT | Performed by: NURSE PRACTITIONER

## 2024-12-03 PROCEDURE — 84100 ASSAY OF PHOSPHORUS: CPT | Performed by: NURSE PRACTITIONER

## 2024-12-03 PROCEDURE — 82570 ASSAY OF URINE CREATININE: CPT | Performed by: INTERNAL MEDICINE

## 2024-12-03 PROCEDURE — 82550 ASSAY OF CK (CPK): CPT | Performed by: NURSE PRACTITIONER

## 2024-12-03 PROCEDURE — 2500000001 HC RX 250 WO HCPCS SELF ADMINISTERED DRUGS (ALT 637 FOR MEDICARE OP): Performed by: NURSE PRACTITIONER

## 2024-12-03 PROCEDURE — 99233 SBSQ HOSP IP/OBS HIGH 50: CPT | Performed by: INTERNAL MEDICINE

## 2024-12-03 PROCEDURE — 85027 COMPLETE CBC AUTOMATED: CPT | Performed by: NURSE PRACTITIONER

## 2024-12-03 PROCEDURE — 80048 BASIC METABOLIC PNL TOTAL CA: CPT | Mod: CCI | Performed by: NURSE PRACTITIONER

## 2024-12-03 PROCEDURE — 2500000002 HC RX 250 W HCPCS SELF ADMINISTERED DRUGS (ALT 637 FOR MEDICARE OP, ALT 636 FOR OP/ED): Performed by: INTERNAL MEDICINE

## 2024-12-03 PROCEDURE — 99232 SBSQ HOSP IP/OBS MODERATE 35: CPT | Performed by: PSYCHIATRY & NEUROLOGY

## 2024-12-03 PROCEDURE — 80053 COMPREHEN METABOLIC PANEL: CPT | Performed by: NURSE PRACTITIONER

## 2024-12-03 PROCEDURE — 81001 URINALYSIS AUTO W/SCOPE: CPT | Performed by: INTERNAL MEDICINE

## 2024-12-03 PROCEDURE — 2060000001 HC INTERMEDIATE ICU ROOM DAILY

## 2024-12-03 PROCEDURE — 85520 HEPARIN ASSAY: CPT | Performed by: INTERNAL MEDICINE

## 2024-12-03 PROCEDURE — 83735 ASSAY OF MAGNESIUM: CPT | Performed by: NURSE PRACTITIONER

## 2024-12-03 PROCEDURE — 74230 X-RAY XM SWLNG FUNCJ C+: CPT

## 2024-12-03 PROCEDURE — 74230 X-RAY XM SWLNG FUNCJ C+: CPT | Performed by: RADIOLOGY

## 2024-12-03 PROCEDURE — 36415 COLL VENOUS BLD VENIPUNCTURE: CPT | Performed by: INTERNAL MEDICINE

## 2024-12-03 PROCEDURE — 2500000005 HC RX 250 GENERAL PHARMACY W/O HCPCS: Performed by: INTERNAL MEDICINE

## 2024-12-03 PROCEDURE — 92611 MOTION FLUOROSCOPY/SWALLOW: CPT | Mod: GN | Performed by: SPEECH-LANGUAGE PATHOLOGIST

## 2024-12-03 PROCEDURE — 36415 COLL VENOUS BLD VENIPUNCTURE: CPT | Performed by: NURSE PRACTITIONER

## 2024-12-03 PROCEDURE — 94640 AIRWAY INHALATION TREATMENT: CPT

## 2024-12-03 PROCEDURE — 2500000004 HC RX 250 GENERAL PHARMACY W/ HCPCS (ALT 636 FOR OP/ED)

## 2024-12-03 RX ORDER — SODIUM CHLORIDE, SODIUM LACTATE, POTASSIUM CHLORIDE, CALCIUM CHLORIDE 600; 310; 30; 20 MG/100ML; MG/100ML; MG/100ML; MG/100ML
75 INJECTION, SOLUTION INTRAVENOUS CONTINUOUS
Status: ACTIVE | OUTPATIENT
Start: 2024-12-03 | End: 2024-12-03

## 2024-12-03 RX ORDER — POTASSIUM CHLORIDE 750 MG/1
10 TABLET, FILM COATED, EXTENDED RELEASE ORAL ONCE
Status: DISCONTINUED | OUTPATIENT
Start: 2024-12-03 | End: 2024-12-06 | Stop reason: HOSPADM

## 2024-12-03 RX ORDER — POTASSIUM CHLORIDE 14.9 MG/ML
20 INJECTION INTRAVENOUS
Status: COMPLETED | OUTPATIENT
Start: 2024-12-03 | End: 2024-12-04

## 2024-12-03 RX ORDER — ONDANSETRON HYDROCHLORIDE 2 MG/ML
4 INJECTION, SOLUTION INTRAVENOUS EVERY 6 HOURS PRN
Status: DISCONTINUED | OUTPATIENT
Start: 2024-12-03 | End: 2024-12-06 | Stop reason: HOSPADM

## 2024-12-03 RX ORDER — DEXTROSE, SODIUM CHLORIDE, SODIUM LACTATE, POTASSIUM CHLORIDE, AND CALCIUM CHLORIDE 5; .6; .31; .03; .02 G/100ML; G/100ML; G/100ML; G/100ML; G/100ML
75 INJECTION, SOLUTION INTRAVENOUS CONTINUOUS
Status: ACTIVE | OUTPATIENT
Start: 2024-12-03 | End: 2024-12-04

## 2024-12-03 RX ORDER — ACETAMINOPHEN 650 MG/1
650 SUPPOSITORY RECTAL EVERY 6 HOURS PRN
Status: DISCONTINUED | OUTPATIENT
Start: 2024-12-03 | End: 2024-12-06 | Stop reason: HOSPADM

## 2024-12-03 RX ADMIN — FORMOTEROL FUMARATE 20 MCG: 20 SOLUTION RESPIRATORY (INHALATION) at 09:00

## 2024-12-03 RX ADMIN — SODIUM CHLORIDE, SODIUM LACTATE, POTASSIUM CHLORIDE, CALCIUM CHLORIDE AND DEXTROSE MONOHYDRATE 55 ML/HR: 5; 600; 310; 30; 20 INJECTION, SOLUTION INTRAVENOUS at 19:58

## 2024-12-03 RX ADMIN — FORMOTEROL FUMARATE 20 MCG: 20 SOLUTION RESPIRATORY (INHALATION) at 20:48

## 2024-12-03 RX ADMIN — BUDESONIDE 0.5 MG: 0.5 INHALANT RESPIRATORY (INHALATION) at 20:48

## 2024-12-03 RX ADMIN — BARIUM SULFATE 15 ML: 0.81 POWDER, FOR SUSPENSION ORAL at 11:28

## 2024-12-03 RX ADMIN — SODIUM CHLORIDE, POTASSIUM CHLORIDE, SODIUM LACTATE AND CALCIUM CHLORIDE 75 ML/HR: 600; 310; 30; 20 INJECTION, SOLUTION INTRAVENOUS at 01:19

## 2024-12-03 RX ADMIN — SODIUM CHLORIDE, POTASSIUM CHLORIDE, SODIUM LACTATE AND CALCIUM CHLORIDE 75 ML/HR: 600; 310; 30; 20 INJECTION, SOLUTION INTRAVENOUS at 04:42

## 2024-12-03 RX ADMIN — PIPERACILLIN SODIUM AND TAZOBACTAM SODIUM 3.38 G: 3; .375 INJECTION, SOLUTION INTRAVENOUS at 20:44

## 2024-12-03 RX ADMIN — PIPERACILLIN SODIUM AND TAZOBACTAM SODIUM 3.38 G: 3; .375 INJECTION, SOLUTION INTRAVENOUS at 12:29

## 2024-12-03 RX ADMIN — IPRATROPIUM BROMIDE 0.5 MG: 0.5 SOLUTION RESPIRATORY (INHALATION) at 09:00

## 2024-12-03 RX ADMIN — HEPARIN SODIUM 900 UNITS/HR: 10000 INJECTION, SOLUTION INTRAVENOUS at 14:15

## 2024-12-03 RX ADMIN — IPRATROPIUM BROMIDE 0.5 MG: 0.5 SOLUTION RESPIRATORY (INHALATION) at 14:28

## 2024-12-03 RX ADMIN — BARIUM SULFATE 5 ML: 400 SUSPENSION ORAL at 11:35

## 2024-12-03 RX ADMIN — CLOPIDOGREL BISULFATE 75 MG: 75 TABLET ORAL at 08:56

## 2024-12-03 RX ADMIN — BUDESONIDE 0.5 MG: 0.5 INHALANT RESPIRATORY (INHALATION) at 09:00

## 2024-12-03 RX ADMIN — BARIUM SULFATE 5 ML: 400 PASTE ORAL at 11:37

## 2024-12-03 RX ADMIN — POTASSIUM CHLORIDE 20 MEQ: 14.9 INJECTION, SOLUTION INTRAVENOUS at 20:24

## 2024-12-03 RX ADMIN — BARIUM SULFATE 20 ML: 400 SUSPENSION ORAL at 11:30

## 2024-12-03 RX ADMIN — POTASSIUM CHLORIDE 20 MEQ: 14.9 INJECTION, SOLUTION INTRAVENOUS at 22:42

## 2024-12-03 RX ADMIN — SENNOSIDES AND DOCUSATE SODIUM 2 TABLET: 50; 8.6 TABLET ORAL at 08:56

## 2024-12-03 RX ADMIN — IPRATROPIUM BROMIDE 0.5 MG: 0.5 SOLUTION RESPIRATORY (INHALATION) at 20:48

## 2024-12-03 RX ADMIN — SODIUM CHLORIDE, POTASSIUM CHLORIDE, SODIUM LACTATE AND CALCIUM CHLORIDE 1000 ML: 600; 310; 30; 20 INJECTION, SOLUTION INTRAVENOUS at 12:32

## 2024-12-03 ASSESSMENT — COGNITIVE AND FUNCTIONAL STATUS - GENERAL
HELP NEEDED FOR BATHING: A LOT
TURNING FROM BACK TO SIDE WHILE IN FLAT BAD: A LOT
PERSONAL GROOMING: A LOT
DRESSING REGULAR LOWER BODY CLOTHING: A LOT
WALKING IN HOSPITAL ROOM: A LOT
TOILETING: A LOT
STANDING UP FROM CHAIR USING ARMS: A LOT
DRESSING REGULAR UPPER BODY CLOTHING: A LOT
MOVING FROM LYING ON BACK TO SITTING ON SIDE OF FLAT BED WITH BEDRAILS: A LOT
DAILY ACTIVITIY SCORE: 13
MOVING TO AND FROM BED TO CHAIR: A LOT
EATING MEALS: A LITTLE
CLIMB 3 TO 5 STEPS WITH RAILING: TOTAL
MOBILITY SCORE: 11

## 2024-12-03 ASSESSMENT — PAIN SCALES - GENERAL
PAINLEVEL_OUTOF10: 0 - NO PAIN

## 2024-12-03 ASSESSMENT — PAIN - FUNCTIONAL ASSESSMENT
PAIN_FUNCTIONAL_ASSESSMENT: 0-10

## 2024-12-03 NOTE — PROGRESS NOTES
"Lorne Avina is a 84 y.o. male on day 1 of admission presenting with Pulmonary embolism on right (Multi).      Subjective   Mental status improving  Pt resting in bed, awake, talking to daughter.  Daughter states that patient is \"angry.\" He wants to go home.        Objective     Last Recorded Vitals  Blood pressure 109/66, pulse 98, temperature 36.1 °C (97 °F), temperature source Temporal, resp. rate 13, height 1.778 m (5' 10\"), weight 53.1 kg (117 lb 1 oz), SpO2 92%.    Physical Exam  Constitutional:       General: He is awake.   Eyes:      Extraocular Movements: Extraocular movements intact.      Pupils: Pupils are equal, round, and reactive to light.   Neurological:      Mental Status: He is alert.   Psychiatric:         Speech: Speech normal.       Neurological Exam  Mental Status  Awake and alert. Oriented only to person. Speech is normal. Language is fluent with no aphasia.  Stated year as 1972.  States he is at [facility].  He was reoriented.  Naming and repetition intact. .    Cranial Nerves  CN II: Visual fields full to confrontation.  CN III, IV, VI: Extraocular movements intact bilaterally. Pupils equal round and reactive to light bilaterally.  CN V: Facial sensation is normal.  CN VII: Full and symmetric facial movement.  CN IX, X: Palate elevates symmetrically  CN XII: Tongue midline without atrophy or fasciculations.    Motor   Strength is 5/5 in all four extremities except as noted.  Left sided hemiparesis from prior stroke.   Able to move feet. .    Sensory  Light touch is normal in upper and lower extremities.     Coordination  Right: Finger-to-nose normal.Left: Finger-to-nose normal.    Relevant Results  Scheduled medications  atorvastatin, 20 mg, oral, Nightly  budesonide, 0.5 mg, nebulization, BID  clopidogrel, 75 mg, oral, Daily  [Held by provider] donepezil, 10 mg, oral, Nightly  formoterol, 20 mcg, nebulization, BID  ipratropium, 0.5 mg, nebulization, TID  lactated Ringer's, 1,000 mL, " intravenous, Once  [Held by provider] mirtazapine, 7.5 mg, oral, Nightly  piperacillin-tazobactam, 3.375 g, intravenous, q8h  potassium chloride CR, 10 mEq, oral, Once  sennosides-docusate sodium, 2 tablet, oral, BID  tamsulosin, 0.4 mg, oral, Nightly      Continuous medications  heparin, 0-4,500 Units/hr, Last Rate: 900 Units/hr (12/03/24 0400)  lactated Ringer's, 75 mL/hr, Last Rate: 75 mL/hr (12/03/24 0442)      PRN medications  PRN medications: acetaminophen **OR** acetaminophen **OR** acetaminophen, albuterol, heparin, ondansetron **OR** ondansetron, oxygen  Results for orders placed or performed during the hospital encounter of 12/01/24 (from the past 24 hours)   Lactate   Result Value Ref Range    Lactate 2.1 (H) 0.4 - 2.0 mmol/L   Heparin Assay, UFH   Result Value Ref Range    Heparin Unfractionated <0.1 See Comment Below for Therapeutic Ranges IU/mL   Transthoracic Echo (TTE) Complete   Result Value Ref Range    LV EF 70 %    RVSP 31.8 mmHg    LV A4C EF 59.1    BLOOD GAS ARTERIAL FULL PANEL   Result Value Ref Range    POCT pH, Arterial 7.46 (H) 7.38 - 7.42 pH    POCT pCO2, Arterial 45 (H) 38 - 42 mm Hg    POCT pO2, Arterial 245 (H) 85 - 95 mm Hg    POCT SO2, Arterial 100 94 - 100 %    POCT Oxy Hemoglobin, Arterial 97.2 94.0 - 98.0 %    POCT Hematocrit Calculated, Arterial 41.0 41.0 - 52.0 %    POCT Sodium, Arterial 142 136 - 145 mmol/L    POCT Potassium, Arterial 3.8 3.5 - 5.3 mmol/L    POCT Chloride, Arterial 109 (H) 98 - 107 mmol/L    POCT Ionized Calcium, Arterial 1.25 1.10 - 1.33 mmol/L    POCT Glucose, Arterial 108 (H) 74 - 99 mg/dL    POCT Lactate, Arterial 1.4 0.4 - 2.0 mmol/L    POCT Base Excess, Arterial 7.1 (H) -2.0 - 3.0 mmol/L    POCT HCO3 Calculated, Arterial 32.0 (H) 22.0 - 26.0 mmol/L    POCT Hemoglobin, Arterial 13.6 13.5 - 17.5 g/dL    POCT Anion Gap, Arterial 5 (L) 10 - 25 mmo/L    Patient Temperature      FiO2 33 %    Apparatus CANNULA    Heparin Assay   Result Value Ref Range    Heparin  Unfractionated 0.2 See Comment Below for Therapeutic Ranges IU/mL   Lactate   Result Value Ref Range    Lactate 4.8 (HH) 0.4 - 2.0 mmol/L   Lactate   Result Value Ref Range    Lactate 2.6 (H) 0.4 - 2.0 mmol/L   Comprehensive metabolic panel   Result Value Ref Range    Glucose 86 74 - 99 mg/dL    Sodium 146 (H) 136 - 145 mmol/L    Potassium 3.8 3.5 - 5.3 mmol/L    Chloride 107 98 - 107 mmol/L    Bicarbonate 29 21 - 32 mmol/L    Anion Gap 14 10 - 20 mmol/L    Urea Nitrogen 53 (H) 6 - 23 mg/dL    Creatinine 0.74 0.50 - 1.30 mg/dL    eGFR 90 >60 mL/min/1.73m*2    Calcium 8.3 (L) 8.6 - 10.3 mg/dL    Albumin 2.8 (L) 3.4 - 5.0 g/dL    Alkaline Phosphatase 48 33 - 136 U/L    Total Protein 5.1 (L) 6.4 - 8.2 g/dL    AST 62 (H) 9 - 39 U/L    Bilirubin, Total 1.1 0.0 - 1.2 mg/dL    ALT 32 10 - 52 U/L   Magnesium   Result Value Ref Range    Magnesium 2.21 1.60 - 2.40 mg/dL   Heparin Assay, UFH   Result Value Ref Range    Heparin Unfractionated 0.4 See Comment Below for Therapeutic Ranges IU/mL   Magnesium   Result Value Ref Range    Magnesium 2.24 1.60 - 2.40 mg/dL   Phosphorus   Result Value Ref Range    Phosphorus 2.9 2.5 - 4.9 mg/dL   Comprehensive Metabolic Panel   Result Value Ref Range    Glucose 92 74 - 99 mg/dL    Sodium 147 (H) 136 - 145 mmol/L    Potassium 3.7 3.5 - 5.3 mmol/L    Chloride 109 (H) 98 - 107 mmol/L    Bicarbonate 29 21 - 32 mmol/L    Anion Gap 13 10 - 20 mmol/L    Urea Nitrogen 51 (H) 6 - 23 mg/dL    Creatinine 0.71 0.50 - 1.30 mg/dL    eGFR >90 >60 mL/min/1.73m*2    Calcium 8.3 (L) 8.6 - 10.3 mg/dL    Albumin 2.7 (L) 3.4 - 5.0 g/dL    Alkaline Phosphatase 46 33 - 136 U/L    Total Protein 5.0 (L) 6.4 - 8.2 g/dL    AST 60 (H) 9 - 39 U/L    Bilirubin, Total 1.1 0.0 - 1.2 mg/dL    ALT 30 10 - 52 U/L   CBC   Result Value Ref Range    WBC 9.8 4.4 - 11.3 x10*3/uL    nRBC 0.0 0.0 - 0.0 /100 WBCs    RBC 3.78 (L) 4.50 - 5.90 x10*6/uL    Hemoglobin 11.4 (L) 13.5 - 17.5 g/dL    Hematocrit 36.8 (L) 41.0 - 52.0 %     MCV 97 80 - 100 fL    MCH 30.2 26.0 - 34.0 pg    MCHC 31.0 (L) 32.0 - 36.0 g/dL    RDW 13.9 11.5 - 14.5 %    Platelets 140 (L) 150 - 450 x10*3/uL   Heparin Assay   Result Value Ref Range    Heparin Unfractionated 0.3 See Comment Below for Therapeutic Ranges IU/mL   Creatine Kinase   Result Value Ref Range    Creatine Kinase 785 (H) 0 - 325 U/L   Lactate   Result Value Ref Range    Lactate 2.8 (H) 0.4 - 2.0 mmol/L     Transthoracic Echo (TTE) Complete    Result Date: 12/2/2024            [facility] [place] [address] [address] [place] [address]    Tel [phone] Fax [phone] TRANSTHORACIC ECHOCARDIOGRAM REPORT Patient Name:       ADDY Jamil Physician:    [id number] Andrzej Mae MD Study Date:         12/2/2024            Ordering Provider:    [id number] MEAGHAN JOEL MRN/PID:            [id number]             Fellow: Accession#:         [id number]         Nurse: Date of Birth/Age:  1940 / 84 years Sonographer:          Coreen Pathak RD Gender Assigned at                      Additional Staff: Birth: Height:             177.80 cm            Admit Date:           12/2/2024 Weight:             50.80 kg             Admission Status:     Inpatient -                                                                Priority                                                                discharge BSA / BMI:          1.63 m2 / 16.07      Department Location:  [facility] Back                     kg/m2                                      (27-34) Blood Pressure: 131 /73 mmHg Study Type:    TRANSTHORACIC ECHO (TTE) COMPLETE Diagnosis/ICD: Other pulmonary embolism without acute cor pulmonale-I26.99 Indication:    PE CPT Codes:     Echo Complete w Full Doppler-64067; Echo Limited-53938  Study Detail: The following Echo  studies were performed: 2D, M-Mode, Doppler and               color flow. Technically challenging study due to poor acoustic               windows and body habitus. Agitated saline used as a contrast agent               for intraseptal flow evaluation.  PHYSICIAN INTERPRETATION: Left Ventricle: Left ventricular ejection fraction is normal, by visual estimate at 70%. There are no regional wall motion abnormalities. The left ventricular cavity size is decreased. The interventricular septum is flattened in systole and diastole, consistent with right ventricular pressure and volume overload. Left ventricular diastolic filling was indeterminate. Left Atrium: The left atrium is mildly dilated. A bubble study using agitated saline was performed. Bubble study is negative. Right Ventricle: The right ventricle is moderately enlarged. There is mildly reduced right ventricular systolic function. Right Atrium: The right atrium is moderately dilated. Aortic Valve: The aortic valve is trileaflet. There is moderate aortic valve cusp calcification. There is evidence of mildly elevated transaortic gradients consistent with sclerosis of the aortic valve. There is no evidence of aortic valve regurgitation. Mitral Valve: The mitral valve is mildly thickened. There is trace mitral valve regurgitation. Tricuspid Valve: The tricuspid valve is structurally normal. There is mild tricuspid regurgitation. The Doppler estimated RVSP is within normal limits at 31.8 mmHg. Pulmonic Valve: The pulmonic valve is not well visualized. There is no indication of pulmonic valve regurgitation. Pericardium: Small pericardial effusion. There is no evidence of cardiac tamponade. Aorta: The aortic root is normal. Systemic Veins: The inferior vena cava appears normal in size.  CONCLUSIONS:  1. Left ventricular ejection fraction is normal, by visual estimate at 70%.  2. Left ventricular diastolic filling was indeterminate.  3. Left ventricular cavity size is  decreased.  4. Right ventricular volume and pressure overload.  5. There is mildly reduced right ventricular systolic function.  6. Moderately enlarged right ventricle.  7. The right atrium is moderately dilated.  8. There is no evidence of cardiac tamponade.  9. Right ventricular systolic pressure is within normal limits. 10. Aortic valve sclerosis. 11. There is moderate aortic valve cusp calcification. QUANTITATIVE DATA SUMMARY:  LV SYSTOLIC FUNCTION BY 2D PLANIMETRY (MOD):                      Normal Ranges: EF-A4C View:    59 % (>=55%) EF-Visual:      70 % LV EF Reported: 70 %  TRICUSPID VALVE/RVSP:          Normal Ranges: Peak TR Velocity:     2.69 m/s RV Syst Pressure:     32 mmHg  (< 30mmHg)  [id number] Andrzej Mae MD Electronically signed on 12/2/2024 at 5:09:09 PM  ** Final **     Electrocardiogram, 12-lead ACS symptoms    Result Date: 12/2/2024  Sinus tachycardia with Fusion complexes Left axis deviation Right bundle branch block T wave abnormality, consider lateral ischemia Abnormal ECG No previous ECGs available    CT head wo IV contrast    Result Date: 12/2/2024  Interpreted By:  Merlin Farias, STUDY: CT HEAD WO IV CONTRAST  12/2/2024 11:09 am   INDICATION: Signs/Symptoms:AMS. on heaprin drip   COMPARISON: 12/01/2004   ACCESSION NUMBER(S): [id number]   ORDERING CLINICIAN: MEAGHAN JOEL   TECHNIQUE: Contiguous axial CT images of the brain were obtained without IV contrast.   FINDINGS: The ventricles, cisterns and sulci are prominent, consistent with severe diffuse volume loss. Areas of white matter low attenuation are nonspecific but likely related to chronic microvascular disease. No change in areas of encephalomalacia. There is intracranial atherosclerosis.   Gray-white differentiation is preserved. No acute intracranial hemorrhage or mass effect. No midline shift. Patent basal cisterns. No extraaxial fluid collections.   The calvaria is intact. The visualized paranasal sinuses and mastoid air cells are  clear.       No acute intracranial pathology.     Signed by: Merlin Farias 12/2/2024 11:54 AM Dictation workstation:   [id number]    CT angio chest for pulmonary embolism    Result Date: 12/1/2024  STUDY: CT Angiogram of the Chest; 12/01/2024 4:00 pm INDICATION: Elevated D-Dimer, down for 4 days. COMPARISON: CT CAP 12/01/2024. ACCESSION NUMBER(S): [id number] ORDERING CLINICIAN: LINDA WINTERS TECHNIQUE:  CTA of the chest was performed with intravenous contrast. Images are reviewed and processed at a workstation according to the CT angiogram protocol with 3-D and/or MIP post processing imaging generated.  Automated mA/kV exposure control was utilized and patient examination was performed in strict accordance with principles of ALARA. FINDINGS: Pulmonary arteries are adequately opacified and there is a single pulmonary embolism involving the right interlobar pulmonary artery extending into the anterobasilar segment.  The thoracic aorta is normal in course and caliber without dissection or aneurysm. Small pericardial effusion.  Severe coronary artery calcifications noted.  Thoracic lymph nodes are not enlarged. There is no pleural effusion, pleural thickening, or pneumothorax. The airways are patent. Emphysematous changes again noted.  Again noted are approximately 3 nodular lesions involving the left upper lobe, left lower lobe and right lower lobe unchanged from exam from the same day measuring up to approximately 2 cm involving the left upper lobe and left lower lobe Upper abdomen demonstrates no acute pathology. There are no acute fractures.  No suspicious bony lesions.    1. Single pulmonary embolism involving the right interlobar pulmonary artery extending into the anterobasilar segment.  Minimal embolic burden.  No evidence of right heart strain or pulmonary infarct. 2. Again noted are approximately 3 nodular lesions involving the left upper lobe, left lower lobe and right lower lobe unchanged from exam from  the same day measuring up to approximately 2 cm involving the left upper lobe and left lower lobe.  Findings concerning for possible neoplasm.  Recommend further evaluation with PET/CT. 3. Emphysema. 4. Small pericardial effusion. 5. Severe coronary artery calcifications. Findings discussed with and acknowledged by Jelly Arana 4:28 PM Signed by Bong Nixon MD    XR tibia fibula bilateral 2 views    Result Date: 12/1/2024  STUDY: Bilateral Tibia and Fibula Radiographs; 12/1/2024 2:00 PM INDICATION: Fall. COMPARISON: None Available. ACCESSION NUMBER(S): [id number] ORDERING CLINICIAN: ROSANA BRUCE TECHNIQUE:  Two view(s) of the right tibia/fibula (four images) and two view(s) of the left tibia/fibula (four images). FINDINGS:  RIGHT TIBIA AND FIBULA:  There is no displaced fracture.  The alignment is anatomic.  No soft tissue abnormality is seen. LEFT TIBIA AND FIBULA:  There is no displaced fracture.  The alignment is anatomic.  No soft tissue abnormality is seen.    No acute osseous abnormality. Signed by Lorne Victor MD    XR femur 2 VW bilateral    Result Date: 12/1/2024  STUDY: Bilateral Femur Radiographs; 12/1/2024 2:00 PM INDICATION: Fall. COMPARISON: None Available. ACCESSION NUMBER(S): [id number] ORDERING CLINICIAN: ROSANA BRUCE TECHNIQUE:  Two view(s) of the right femur (four images) and two view(s) of the left femur (four images). FINDINGS:  Mild generalized osseous demineralization is noted. Right:  There is no displaced fracture.  The alignment is anatomic. No soft tissue abnormality is seen. Left:  There is no displaced fracture.  The alignment is anatomic.  No soft tissue abnormality is seen. Excreted contrast is noted in the urinary bladder.  Peripheral vascular calcifications are seen bilaterally.    No definite acute osseous abnormality identified. Signed by Aiden Barrow    XR ribs 2 views left w chest pa or ap    Result Date: 12/1/2024  STUDY: Left Rib and Chest Radiographs  INDICATION: Fall. COMPARISON: None Available. ACCESSION NUMBER(S): [id number] ORDERING CLINICIAN: ROSANA BRUCE TECHNIQUE:  Frontal chest and five view(s) of the left ribs. FINDINGS:  CARDIOMEDIASTINAL SILHOUETTE: Cardiomediastinal silhouette is normal in size and configuration. Calcified plaque is seen in the aorta.  LUNGS: Lungs are clear.  ABDOMEN: No remarkable upper abdominal findings.  Excreted contrast is seen in the collecting systems. LEFT RIBS:  There is no acute rib fracture. OTHER VISUALIZED BONES: No acute osseous changes.  Cervical fusion hardware is partially visualized.  Mild levoconvex curvature is seen of the mid thoracic spine.    No definite acute cardiopulmonary disease. No definite acute left rib fracture identified. Signed by Aiden Barrow    XR pelvis 1-2 views    Result Date: 12/1/2024  STUDY: Pelvis Radiographs; 12/1/2024 2:00 PM INDICATION: Fall. COMPARISON: None Available. ACCESSION NUMBER(S): [id number] ORDERING CLINICIAN: ROSANA BRUCE TECHNIQUE:  One view(s) of the pelvis. FINDINGS:  The pelvic ring is intact.  There is no acute fracture.  Mild degenerative changes of both hips.    No acute osseous abnormality. Signed by Bong Nixon MD    CT chest abdomen pelvis w IV contrast    Result Date: 12/1/2024  STUDY: CT Chest, Abdomen, and Pelvis with IV Contrast, CT Thoracic Spine and Lumbar Spine without IV Contrast; 12/01/2024 1:20 PM INDICATION: Fall - on blood thinners. COMPARISON: CT cervical spine 10/12/2023. ACCESSION NUMBER(S): [id number], [id number], [id number] ORDERING CLINICIAN: LINDA WINTERS TECHNIQUE: CT of the chest, abdomen, and pelvis was performed.  Contiguous axial images were obtained at 3 mm slice thickness through the chest, abdomen, and pelvis.  Coronal and sagittal reconstructions at 3 mm slice thickness were performed.  Omnipaque 350 60 mL was administered intravenously.  Please note that spinal images were generated from the original CT abdomen and  pelvis imaging. FINDINGS: CHEST: MEDIASTINUM: The heart is normal in size without pericardial effusion.  Aortic valvular and coronary artery calcifications are visualized.  Central vascular structures opacify normally.  LUNGS/PLEURA: There is no pleural effusion, pleural thickening, or pneumothorax. The airways are patent. The lungs demonstrate emphysematous changes bilaterally with a predominantly centrilobular distribution in an upper lobe predilection.  There is a spiculated lesion located within the left lower lobe measuring 1.8 cm in diameter (204-156) .  An additional ill-defined nodular density is noted within the left upper lobe which measures 1.8 cm in diameter (204-160) .  There is an ill-defined groundglass nodular density located within the right lower lobe measuring 9 mm in diameter (204-188).  The graft there is biapical pleural-parenchymal scarring noted. LYMPH NODES: There is no evidence of significant thoracic lymphadenopathy by CT size criteria.. ABDOMEN:  LIVER: No hepatomegaly.  Smooth surface contour.  Normal attenuation.  There are subcentimeter hypoattenuating lesions noted within the liver. Largest measures 9 mm in diameter (201-102).  These are too small to characterize by size criteria.  There is an additional hypodense lesion located within left hepatic lobe measuring 1.4 cm in diameter (201-94) which likely represents a cyst or hemangioma.  BILE DUCTS: No intrahepatic or extrahepatic biliary ductal dilatation.  GALLBLADDER: The gallbladder is visualized.  Radiopaque calculi are noted. STOMACH: No abnormalities identified.  PANCREAS: No masses or ductal dilatation.  SPLEEN: There are calcifications noted within the spleen and liver which may represent sequelae of previous granulomatous disease.  ADRENAL GLANDS: No thickening or nodules.  KIDNEYS AND URETERS: Kidneys are normal in size and location.  No renal or ureteral calculi.  There are bilateral renal cysts noted.  The largest within  the right kidney measures proximally 4.9 cm in diameter.  No hydronephrosis or nephrolithiasis.  PELVIS:  BLADDER: No abnormalities identified.  REPRODUCTIVE ORGANS: No abnormalities identified.  BOWEL: No abnormalities identified.  VESSELS: There are severe calcific atherosclerotic changes of the abdominal aorta and iliac vessels.  There is an infrarenal abdominal aortic aneurysm noted containing mural thrombus which measures up to 4.1 cm in diameter (201-138).  The visceral vessels enhance normally.  The inferior mesenteric artery is not clearly visualized..  There are severe calcific atherosclerotic changes of the iliac vessels. Calcific atherosclerotic changes of the internal iliac arteries are noted bilaterally.  PERITONEUM/RETROPERITONEUM/LYMPH NODES: No free fluid.  No pneumoperitoneum. There is no evidence of significant abdominal or pelvic lymphadenopathy by CT size criteria.  ABDOMINAL WALL: There is colonic calculus is noted without CT evidence of diverticulitis.  The appendix is not clearly visualized.  There is no definite evidence of bowel wall thickening or dilatation to suggest mechanical obstruction.  SOFT TISSUES: There are infiltrative changes and ill-defined fluid noted overlying the subcutaneous soft tissues of the left posterior pelvis and hip (201-179) sees.  BONES: No acute fracture or aggressive osseous lesion.  Status post anterior fusion of the lower cervical spine.  THORACIC SPINE: The alignment is anatomic.  There is no fracture or traumatic subluxation. The vertebral body heights are well maintained.  There are multilevel degenerative changes of the mid to distal thoracic spine with anterior marginal spurring noted..  No significant central canal stenosis is demonstrated.  The neural foramina are patent throughout.  The paravertebral soft tissues are within normal limits. LUMBAR SPINE: The alignment is anatomic.  There is no fracture or traumatic subluxation. The vertebral body heights  are well maintained.  There is multilevel degenerative disc disease of the lumbar spine with changes most prominently noted at the L1-L2 and L2-L3 levels with associated anterior osteophytosis.  No definite spondylolisthesis..  No significant central canal stenosis is demonstrated.  There is some mild bilateral neuroforamina narrowing noted at the L5-S1 level.  Mild left narrowing with associated facet hypertrophy may be noted at the L4-L5 level..  The paravertebral soft tissues are within normal limits.    1.  No definite CT evidence of acute thoracic vascular injury. 2.  There is a solid spiculated lesion located within the left lower lobe which measures 1.8 cm in diameter.  This is suspicious for possible neoplasm.  Additional nodular densities are noted within the left upper lobe and right lower lobe as described above.  Recommend either a PET/CT for further evaluation or possible tissue sampling. 3.  No focal pulmonary consolidation, pleural effusions or pneumothorax. 4.  No definite CT evidence of abdominal or pelvic visceral/vascular injury. 5.  No intra-abdominal/pelvic fluid collections pneumoperitoneum. 6.  No acute thoracic or lumbar vertebral body compression/fracture. 7.  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 8. other findings as stated above. Signed by Terrence Borjas MD    CT thoracic spine wo IV contrast    Result Date: 12/1/2024  STUDY: CT Chest, Abdomen, and Pelvis with IV Contrast, CT Thoracic Spine and Lumbar Spine without IV Contrast; 12/01/2024 1:20 PM INDICATION: Fall - on blood thinners. COMPARISON: CT cervical spine 10/12/2023. ACCESSION NUMBER(S): [id number], [id number], [id number] ORDERING CLINICIAN: LINDA WINTERS TECHNIQUE: CT of the chest, abdomen, and pelvis was performed.  Contiguous axial images were obtained at 3 mm slice thickness through the chest, abdomen, and pelvis.  Coronal and sagittal reconstructions at 3  mm slice thickness were performed.  Omnipaque 350 60 mL was administered intravenously.  Please note that spinal images were generated from the original CT abdomen and pelvis imaging. FINDINGS: CHEST: MEDIASTINUM: The heart is normal in size without pericardial effusion.  Aortic valvular and coronary artery calcifications are visualized.  Central vascular structures opacify normally.  LUNGS/PLEURA: There is no pleural effusion, pleural thickening, or pneumothorax. The airways are patent. The lungs demonstrate emphysematous changes bilaterally with a predominantly centrilobular distribution in an upper lobe predilection.  There is a spiculated lesion located within the left lower lobe measuring 1.8 cm in diameter (204-156) .  An additional ill-defined nodular density is noted within the left upper lobe which measures 1.8 cm in diameter (204-160) .  There is an ill-defined groundglass nodular density located within the right lower lobe measuring 9 mm in diameter (204-188).  The graft there is biapical pleural-parenchymal scarring noted. LYMPH NODES: There is no evidence of significant thoracic lymphadenopathy by CT size criteria.. ABDOMEN:  LIVER: No hepatomegaly.  Smooth surface contour.  Normal attenuation.  There are subcentimeter hypoattenuating lesions noted within the liver. Largest measures 9 mm in diameter (201-102).  These are too small to characterize by size criteria.  There is an additional hypodense lesion located within left hepatic lobe measuring 1.4 cm in diameter (201-94) which likely represents a cyst or hemangioma.  BILE DUCTS: No intrahepatic or extrahepatic biliary ductal dilatation.  GALLBLADDER: The gallbladder is visualized.  Radiopaque calculi are noted. STOMACH: No abnormalities identified.  PANCREAS: No masses or ductal dilatation.  SPLEEN: There are calcifications noted within the spleen and liver which may represent sequelae of previous granulomatous disease.  ADRENAL GLANDS: No thickening  or nodules.  KIDNEYS AND URETERS: Kidneys are normal in size and location.  No renal or ureteral calculi.  There are bilateral renal cysts noted.  The largest within the right kidney measures proximally 4.9 cm in diameter.  No hydronephrosis or nephrolithiasis.  PELVIS:  BLADDER: No abnormalities identified.  REPRODUCTIVE ORGANS: No abnormalities identified.  BOWEL: No abnormalities identified.  VESSELS: There are severe calcific atherosclerotic changes of the abdominal aorta and iliac vessels.  There is an infrarenal abdominal aortic aneurysm noted containing mural thrombus which measures up to 4.1 cm in diameter (201-138).  The visceral vessels enhance normally.  The inferior mesenteric artery is not clearly visualized..  There are severe calcific atherosclerotic changes of the iliac vessels. Calcific atherosclerotic changes of the internal iliac arteries are noted bilaterally.  PERITONEUM/RETROPERITONEUM/LYMPH NODES: No free fluid.  No pneumoperitoneum. There is no evidence of significant abdominal or pelvic lymphadenopathy by CT size criteria.  ABDOMINAL WALL: There is colonic calculus is noted without CT evidence of diverticulitis.  The appendix is not clearly visualized.  There is no definite evidence of bowel wall thickening or dilatation to suggest mechanical obstruction.  SOFT TISSUES: There are infiltrative changes and ill-defined fluid noted overlying the subcutaneous soft tissues of the left posterior pelvis and hip (201-179) sees.  BONES: No acute fracture or aggressive osseous lesion.  Status post anterior fusion of the lower cervical spine.  THORACIC SPINE: The alignment is anatomic.  There is no fracture or traumatic subluxation. The vertebral body heights are well maintained.  There are multilevel degenerative changes of the mid to distal thoracic spine with anterior marginal spurring noted..  No significant central canal stenosis is demonstrated.  The neural foramina are patent throughout.  The  paravertebral soft tissues are within normal limits. LUMBAR SPINE: The alignment is anatomic.  There is no fracture or traumatic subluxation. The vertebral body heights are well maintained.  There is multilevel degenerative disc disease of the lumbar spine with changes most prominently noted at the L1-L2 and L2-L3 levels with associated anterior osteophytosis.  No definite spondylolisthesis..  No significant central canal stenosis is demonstrated.  There is some mild bilateral neuroforamina narrowing noted at the L5-S1 level.  Mild left narrowing with associated facet hypertrophy may be noted at the L4-L5 level..  The paravertebral soft tissues are within normal limits.    1.  No definite CT evidence of acute thoracic vascular injury. 2.  There is a solid spiculated lesion located within the left lower lobe which measures 1.8 cm in diameter.  This is suspicious for possible neoplasm.  Additional nodular densities are noted within the left upper lobe and right lower lobe as described above.  Recommend either a PET/CT for further evaluation or possible tissue sampling. 3.  No focal pulmonary consolidation, pleural effusions or pneumothorax. 4.  No definite CT evidence of abdominal or pelvic visceral/vascular injury. 5.  No intra-abdominal/pelvic fluid collections pneumoperitoneum. 6.  No acute thoracic or lumbar vertebral body compression/fracture. 7.  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 8. other findings as stated above. Signed by Terrence Borjas MD    CT lumbar spine wo IV contrast    Result Date: 12/1/2024  STUDY: CT Chest, Abdomen, and Pelvis with IV Contrast, CT Thoracic Spine and Lumbar Spine without IV Contrast; 12/01/2024 1:20 PM INDICATION: Fall - on blood thinners. COMPARISON: CT cervical spine 10/12/2023. ACCESSION NUMBER(S): [id number], [id number], [id number] ORDERING CLINICIAN: LINDA WINTERS TECHNIQUE: CT of the chest, abdomen, and  pelvis was performed.  Contiguous axial images were obtained at 3 mm slice thickness through the chest, abdomen, and pelvis.  Coronal and sagittal reconstructions at 3 mm slice thickness were performed.  Omnipaque 350 60 mL was administered intravenously.  Please note that spinal images were generated from the original CT abdomen and pelvis imaging. FINDINGS: CHEST: MEDIASTINUM: The heart is normal in size without pericardial effusion.  Aortic valvular and coronary artery calcifications are visualized.  Central vascular structures opacify normally.  LUNGS/PLEURA: There is no pleural effusion, pleural thickening, or pneumothorax. The airways are patent. The lungs demonstrate emphysematous changes bilaterally with a predominantly centrilobular distribution in an upper lobe predilection.  There is a spiculated lesion located within the left lower lobe measuring 1.8 cm in diameter (204-156) .  An additional ill-defined nodular density is noted within the left upper lobe which measures 1.8 cm in diameter (204-160) .  There is an ill-defined groundglass nodular density located within the right lower lobe measuring 9 mm in diameter (204-188).  The graft there is biapical pleural-parenchymal scarring noted. LYMPH NODES: There is no evidence of significant thoracic lymphadenopathy by CT size criteria.. ABDOMEN:  LIVER: No hepatomegaly.  Smooth surface contour.  Normal attenuation.  There are subcentimeter hypoattenuating lesions noted within the liver. Largest measures 9 mm in diameter (201-102).  These are too small to characterize by size criteria.  There is an additional hypodense lesion located within left hepatic lobe measuring 1.4 cm in diameter (201-94) which likely represents a cyst or hemangioma.  BILE DUCTS: No intrahepatic or extrahepatic biliary ductal dilatation.  GALLBLADDER: The gallbladder is visualized.  Radiopaque calculi are noted. STOMACH: No abnormalities identified.  PANCREAS: No masses or ductal  dilatation.  SPLEEN: There are calcifications noted within the spleen and liver which may represent sequelae of previous granulomatous disease.  ADRENAL GLANDS: No thickening or nodules.  KIDNEYS AND URETERS: Kidneys are normal in size and location.  No renal or ureteral calculi.  There are bilateral renal cysts noted.  The largest within the right kidney measures proximally 4.9 cm in diameter.  No hydronephrosis or nephrolithiasis.  PELVIS:  BLADDER: No abnormalities identified.  REPRODUCTIVE ORGANS: No abnormalities identified.  BOWEL: No abnormalities identified.  VESSELS: There are severe calcific atherosclerotic changes of the abdominal aorta and iliac vessels.  There is an infrarenal abdominal aortic aneurysm noted containing mural thrombus which measures up to 4.1 cm in diameter (201-138).  The visceral vessels enhance normally.  The inferior mesenteric artery is not clearly visualized..  There are severe calcific atherosclerotic changes of the iliac vessels. Calcific atherosclerotic changes of the internal iliac arteries are noted bilaterally.  PERITONEUM/RETROPERITONEUM/LYMPH NODES: No free fluid.  No pneumoperitoneum. There is no evidence of significant abdominal or pelvic lymphadenopathy by CT size criteria.  ABDOMINAL WALL: There is colonic calculus is noted without CT evidence of diverticulitis.  The appendix is not clearly visualized.  There is no definite evidence of bowel wall thickening or dilatation to suggest mechanical obstruction.  SOFT TISSUES: There are infiltrative changes and ill-defined fluid noted overlying the subcutaneous soft tissues of the left posterior pelvis and hip (201-179) sees.  BONES: No acute fracture or aggressive osseous lesion.  Status post anterior fusion of the lower cervical spine.  THORACIC SPINE: The alignment is anatomic.  There is no fracture or traumatic subluxation. The vertebral body heights are well maintained.  There are multilevel degenerative changes of the  mid to distal thoracic spine with anterior marginal spurring noted..  No significant central canal stenosis is demonstrated.  The neural foramina are patent throughout.  The paravertebral soft tissues are within normal limits. LUMBAR SPINE: The alignment is anatomic.  There is no fracture or traumatic subluxation. The vertebral body heights are well maintained.  There is multilevel degenerative disc disease of the lumbar spine with changes most prominently noted at the L1-L2 and L2-L3 levels with associated anterior osteophytosis.  No definite spondylolisthesis..  No significant central canal stenosis is demonstrated.  There is some mild bilateral neuroforamina narrowing noted at the L5-S1 level.  Mild left narrowing with associated facet hypertrophy may be noted at the L4-L5 level..  The paravertebral soft tissues are within normal limits.    1.  No definite CT evidence of acute thoracic vascular injury. 2.  There is a solid spiculated lesion located within the left lower lobe which measures 1.8 cm in diameter.  This is suspicious for possible neoplasm.  Additional nodular densities are noted within the left upper lobe and right lower lobe as described above.  Recommend either a PET/CT for further evaluation or possible tissue sampling. 3.  No focal pulmonary consolidation, pleural effusions or pneumothorax. 4.  No definite CT evidence of abdominal or pelvic visceral/vascular injury. 5.  No intra-abdominal/pelvic fluid collections pneumoperitoneum. 6.  No acute thoracic or lumbar vertebral body compression/fracture. 7.  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 8. other findings as stated above. Signed by Terrence Borjas MD    CT head wo IV contrast    Result Date: 12/1/2024  Interpreted By:  Hai Kruger, STUDY: CT HEAD WO IV CONTRAST; ;  12/1/2024 1:10 pm   INDICATION: Signs/Symptoms:fall on thinners.   COMPARISON: 10/12/2023   ACCESSION NUMBER(S):  [id number]   ORDERING CLINICIAN: ILNDA WINTERS   TECHNIQUE: Contiguous unenhanced axial CT sections are performed from the skull base to the vertex.   The study is limited by motion degradation.   FINDINGS: There is mucoperiosteal thickening of the ethmoid air cells and maxillary sinuses. A fluid level is identified in the left frontal compartment and small fluid level in the left maxillary sinus. The mastoid air cells are clear. There is right periorbital hematoma and hematoma overlying the frontal calvarium at the midline and to the left of midline. There is some soft tissue swelling also noted over the posterior parietal calvarium. There is no sign of depressed calvarial fracture. The visualized osseous structures are intact.   There is moderate to severe generalized parenchymal volume loss with enlargement of the cortical sulci and CSF spaces similar to the previous study. There is diffuse hypoattenuation in the cerebral white matter bilaterally compatible with small-vessel ischemia in greater on the right. These findings are also similar to the previous study. 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 These findings are also unchanged. Old infarct is also suspected in the posterior right cerebellum which is stable as well.   There is no sign of parenchymal hematoma or dense extra-axial fluid collection. There is no mass effect or midline shift. The gray matter/white matter distribution is preserved.       Soft tissue hematomas are identified overlying the right orbit, maxilla, and calvarium as described above. There is no acute fracture.   Severe age-related atrophy and small-vessel ischemic changes of the cerebral white matter.   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 These findings have remained  unchanged.   No CT evidence of acute intracranial hemorrhage or mass effect.   Mucoperiosteal thickening of the ethmoid and maxillary sinuses. There is fluid level in the left frontal compartment and left maxillary sinus which could reflect acute inflammatory changes. If there is clinical concern for occult facial fracture, CT examination can be performed.     MACRO: None   Signed by: Hai Kruger 12/1/2024 1:29 PM Dictation workstation:   [id number]    CT cervical spine wo IV contrast    Result Date: 12/1/2024  Interpreted By:  Hai Kruger, STUDY: CT CERVICAL SPINE WO IV CONTRAST; ;  12/1/2024 1:10 pm   INDICATION: Signs/Symptoms:fall on thinners.   COMPARISON: 10/12/2023   ACCESSION NUMBER(S): [id number]   ORDERING CLINICIAN: LINDA WINTERS   TECHNIQUE: Contiguous axial CT sections are performed from the skullbase to the upper thoracic spine and supplemented with coronal and sagittal reformatted images.   FINDINGS: The patient is status post anterior cervical fusion from C4 through C7 with plate and screw fixation. The hardware is intact. There is no lucency surrounding the hardware. The appearance is stable from 10/12/2023.   There is mild levo convexity of the lumbar spine and straightening of the lumbar lordosis. The facet joints align normally.   There is cervical spondylosis with disc space narrowing at C7-T1 and to a lesser extent C3-4. This appearance is also stable.   The cervical vertebral body heights are maintained. The C1 ring is intact. There is no sign of cervical vertebral fracture. There is no bone destruction or aggressive periosteal reaction. No lytic or blastic lesion is detected.   There is multiple bulging disc and marginal osteophyte with multilevel central canal narrowing which is greatest at C5-6 and C6-7. This appearance is also unchanged. There is bilateral multilevel neural foraminal narrowing secondary to facet and uncovertebral arthrosis which is greatest at C3-4  on the right. Multilevel hypertrophic facet arthrosis is most pronounced at C2-3 on the left at C3-4 on the right.   The surrounding soft tissues demonstrate biapical pleural and parenchymal scarring with emphysematous changes. There is no prevertebral soft tissue swelling or retropharyngeal air.       Status post multilevel anterior cervical fusion from C4 through C7. There is no hardware failure.   No acute fracture or subluxation.   Multilevel degenerative changes with central canal and neural foraminal narrowing as detailed above, stable from 10/12/2023.     MACRO: None   Signed by: Hai Kruger 12/1/2024 1:22 PM Dictation workstation:   [id number]        Assessment/Plan   This patient currently has cardiac telemetry ordered; if you would like to modify or discontinue the telemetry order, click here to go to the orders activity to modify/discontinue the order.  Assessment & Plan  Pulmonary embolism on right (Multi)    PE (pulmonary thromboembolism) (Multi)    HTN (hypertension)    Incidental pulmonary nodule    Chronic hypoxic respiratory failure, on home oxygen therapy (Multi)    Rhabdomyolysis    Elevated troponin    Elevated brain natriuretic peptide (BNP) level    Leukocytosis    Metabolic encephalopathy    Unresponsive episode of unclear etiology.   Has returned to baseline per daughter at bedside.   CT head unremarkable.   No indication for MRI brain at this time as mentation has improved.  No further needs from neurology; okay for transfer or discharge as per primary team. Please contact if condition changes for re-eval.    Case/plan discussed and pt seen with DR. Mendez.      Adilia Yee, APRN-CNP      I saw and evaluated the patient.  I obtained the key portions of the history and examination.  I reviewed the residents/APNs note, discussed the patient and supervised treatment plan formulation.    Subjective:  No overnight events  Mental status has improved and is back to  "baseline    Objective:  BP 97/61 (BP Location: Left arm, Patient Position: Lying)   Pulse 93   Temp 36 °C (96.8 °F) (Temporal)   Resp 16   Ht 1.778 m (5' 10\")   Wt 53.1 kg (117 lb 1 oz)   SpO2 94%   BMI 16.80 kg/m²     Gen: NAD  Neuro:  --HIF: A&O X 1 (knew he was in [place]; did not know the year), repetition and naming intact  --CN:  PERRLA, EOMI, VFF, no visible facial asymmetry, facial sensation intact, no tongue or palatal deviation, SCM intact  --Motor: Mild left hemiparesis  --Sensory: Intact to light touch, intact to pinprick  --Reflex: 1+ symmetric, toes down  --Cerebellum: FTN and HTS intact  --Gait: Deferred     CT Head 12/2/2024 (I personally reviewed the images/tracings with the following interpretation)  No acute changes    Assessment:   Unresponsive Episode  - reviewed head CT which was negative  - patient is back at baseline  - no further neurological workup    Cholo Mendez MD  [facility]  Department of Neurology      "

## 2024-12-03 NOTE — PROGRESS NOTES
Physical Therapy                 Therapy Communication Note    Patient Name: Lorne Avina  MRN: [id number]  Department: [facility]  Room: 2115/2115-A  Today's Date: 12/3/2024     Discipline: Physical Therapy    Missed Visit Reason: Missed Visit Reason:  (Pt down for modified barrium swallow. Will follow up as appropriate.)

## 2024-12-03 NOTE — PROCEDURES
Speech-Language Pathology    Inpatient Modified Barium Swallow Study    Patient Name: Lorne Avina  MRN: [id number]  : 1940  Today's Date: 24  Time Calculation  Start Time: 1118  Stop Time: 1146  Time Calculation (min): 28 min      Modified Barium Swallow Study completed. Informed verbal consent obtained prior to completion of exam. The study was completed per modified protocol with various liquid barium consistencies and pudding. A 1.9 cm or .75 inch (outer diameter) ring was placed on the chin in the lateral view  in order to complete objective measurements during swallowing. The anatomic structures and function of the oropharynx, larynx, hypopharynx and cervical esophagus were evaluated.    SLP: KIERSTEN DAVALOS, CCC_SLP; EFFIE VARGAS-JACQUES   Contact info: [employer]; phone: [phone]    Reason for Referral: Suspected oral or pharyngeal dysphagia  Patient Hx: Patient is an 84-year-old man with a PMHx for HTN, HLD, infrarenal AAA, Hx of SVT s/p ablation, CVA with residual left-sided weakness, chronic hypoxic respiratory failure 2/2 COPD on 2 L NC prn, and mild cognitive impairment who presents to [facility] following a fall.     Upon presentation today, patient does not report being in pain, however patient had difficulty following directions with evident confusion throughout study..     Respiratory Status: 3 L O2 via nasal cannula  Current diet: Puree solids and moderately/honey thick liquids per clinical swallow evaluation 24  Pain:  Pain Scale: 0-10  Ratin    FINAL SPEECH RECOMMENDATIONS    DIET: NPO; may have 2-3 small, single ice chips AFTER ORAL CARE    STRATEGIES:  - Complete oral care frequently throughout the day    EXERCISES: Effortful Swallow with ice chips; additional exercises may be considered pending improvement in cognitive status and ability to follow commands    SLP PLAN:  Skilled SLP Services: Skilled SLP intervention for dysphagia is warranted.  SLP Frequency: 3x per  week  Duration: 2 weeks  Treatment/Interventions:   - Oropharyngeal exercises  - Patient/caregiver education    Discussed POC: Patient  Discussed Risks/Benefits: Yes  Patient/Caregiver Agreeable: Yes    Short term goals established 12/03/24:   Patient/Family will verbalize/demonstrate comprehension of dysphagia education, strategies, recommendations and POC.  2.   Patient will complete recommended swallowing exercises (effortful swallow ) for at least 30 repetitions during treatment session given minimal-moderate cues.  3.   Patient will tolerate PO trials of ice chips with consistent swallow elicited and without overt s./s of aspiration in 90% of trials.        Additional Medical Consults Suggested:   Palliative care to consider goals of care, if no improvement in cognition/swallowing status is observed    Repeat Study: Pending improved cognitive status       Mechanics of the Swallow Summary:  ORAL PHASE:  Lip Closure - Profuse escape through open lips   Tongue Control During Bolus Hold - Unable to assess due to inability to follow directions  Bolus prep/mastication - Mastication not assessed   Bolus transport/lingual motion - Repetitive/disorganized tongue motion (tongue pumping)   Oral residue - Majority of bolus remaining     PHARYNGEAL PHASE:  Initiation of pharyngeal swallow - Collection of bolus at the level of the pyriform sinus   Soft palate elevation - Escape to nasopharynx   Laryngeal elevation - Partial superior movement of thyroid cartilage and/or partial approximation of arytenoids to epiglottic petiole  inconsistent   Anterior hyoid excursion - Partial anterior movement  inconsistent  Epiglottic movement - Partial inversion inconsistent  Laryngeal vestibule closure - Incomplete - narrow column of air/contrast in laryngeal vestibule   Pharyngeal stripping wave -  Absent   Pharyngeal contraction (A/P view) - Not tested       Pharyngoesophageal segment opening - Minimal distension/incomplete duration  with marked obstruction of flow of bolus   Tongue base retraction - Wide column of contrast or air between tongue base and pharyngeal wall   Pharyngeal residue - Majority of contrast within or on the pharyngeal structures     ESOPHAGEAL PHASE:  Esophageal clearance - Not evaluated       SLP Impressions with Severity Rating:   Pt presents with severe oropharyngeal dysphagia upon completion of modified barium swallow study this date. Swallowing physiology is detailed above. Impairments most impacting swallowing safety and efficiency include tongue pumping, delayed initiation of swallow, decreased soft palate elevation, decreased tongue base retraction, reduced laryngeal elevation, reduced anterior hyoid excursion, partial epiglottic movement, incomplete laryngeal vestibular closure, absent pharyngeal stripping wave, and minimal distension during PES opening. Patient's laryngeal elevation and anterior hyoid excursion were inconsistent as the elevation and excursion only occurred completely with moderately thick liquids and pureed solids. This also occurred with the patient's epiglottic inversion as it was inconsistent due to only inverting with moderately thick liquids and puree solids. Patient demonstrated trace penetration above the vocal folds during most consistencies trialed (mildly thick liquid, moderately thick liquid, and puree solids). Penetration reached the vocal cords with visible residue after the mildly thick, moderately thick and puree solid trials.  Although no aspiration was visualized during study, patient demonstrated moderate -severe oral and pharyngeal residue that was minimally reduced with cued repeat swallows, however patient was inconsistently responsive to these cues. Oral suctioning was required following pudding consistency due to patient's inability to clear bolus from oral cavity.    Per the results of this assessment, patient is at high risk for aspiration due to severity of oropharyngeal  deficits. Recommend NPO with strict oral care, and small quantities of ice chips after oral care for comfort. Also recommend palliative care consult as it may be appropriate to determine goals of care regarding PO intake. ST to continue to follow during inpatient stay to provide ongoing education, and to direct swallowing exercises targeting deficits identified. Repeat MBSS may be considered if patient demonstrates improvement at bedside during inpatient stay.      OUTCOME MEASURES:  Functional Oral Intake Scale  Functional Oral Intake Scale: Level 1        nothing by mouth    Rosenbek's Penetration Aspiration Scale  Thin Liquids: only one partial swallow visualized with a minimal amount of bolus  Nectar Thick Liquids: 5. DEEP PENETRATION with HIGH ASPIRATION risk - contrast contacts vocal cords, visible residue  During the Swallow  Honey Thick Liquids: 5. DEEP PENETRATION with HIGH ASPIRATION risk - contrast contacts vocal cords, visible residue  During the Swallow  After the Swallow  Puree: 5. DEEP PENETRATION with HIGH ASPIRATION risk - contrast contacts vocal cords, visible residue  During the Swallow  After the Swallow

## 2024-12-03 NOTE — PROGRESS NOTES
"  Patient: Lorne Avina  : 1940  MRN: [id number]    Today's Date: 24  Hospital Day: #1    Primary Cardiologist:    ASSESSMENT/PLAN:  Type II myocardial infarction: LVEF is normal.  Okay to resume statin therapy.  Continue Plavix.  No need for ischemic workup.  Pulmonary embolism: Relatively small.  Continue heparin.  Transition to DOAC today or tomorrow if reasonably assured can safely swallow pills.  Rhabdo: Still clinically appears to be mildly dehydrated with elevated serum sodium.  Would continue hydration.  Disp: Cardiology will sign off.      SUBJECTIVE:  Patient is remarkably improved over the last 24 hours.  He is communicative and able to hold conversations.  He seems understand what is going on.  No specific complaints.    OBJECTIVE:  VITALS: BP 92/62 (BP Location: Left arm, Patient Position: Lying)   Pulse 98   Temp 36.3 °C (97.3 °F) (Temporal)   Resp 13   Ht 1.778 m (5' 10\")   Wt 53.1 kg (117 lb 1 oz)   SpO2 100%   BMI 16.80 kg/m²       Intake/Output Summary (Last 24 hours) at 12/3/2024 0842  Last data filed at 2024 1700  Gross per 24 hour   Intake 759.17 ml   Output --   Net 759.17 ml       Physical Exam  Vitals reviewed.   Cardiovascular:      Rate and Rhythm: Normal rate and regular rhythm.      Pulses:           Dorsalis pedis pulses are 1+ on the right side and 1+ on the left side.        Posterior tibial pulses are 1+ on the right side and 1+ on the left side.      Heart sounds: No murmur heard.  Pulmonary:      Effort: Pulmonary effort is normal. No respiratory distress.      Breath sounds: No wheezing or rales.   Abdominal:      General: Abdomen is flat. There is no distension.      Palpations: Abdomen is soft.   Musculoskeletal:      Right lower leg: No edema.      Left lower leg: No edema.   Skin:     General: Skin is warm and dry.   Neurological:      General: No focal deficit present.      Mental Status: He is alert and oriented to person, place, and time. "   Psychiatric:         Mood and Affect: Mood normal.          Meds:Scheduled medications  atorvastatin, 20 mg, oral, Nightly  budesonide, 0.5 mg, nebulization, BID  clopidogrel, 75 mg, oral, Daily  [Held by provider] donepezil, 10 mg, oral, Nightly  formoterol, 20 mcg, nebulization, BID  ipratropium, 0.5 mg, nebulization, TID  [Held by provider] mirtazapine, 7.5 mg, oral, Nightly  piperacillin-tazobactam, 3.375 g, intravenous, q8h  sennosides-docusate sodium, 2 tablet, oral, BID  tamsulosin, 0.4 mg, oral, Nightly      Continuous medications  heparin, 0-4,500 Units/hr, Last Rate: 900 Units/hr (12/03/24 0400)  lactated Ringer's, 75 mL/hr, Last Rate: 75 mL/hr (12/03/24 0442)      PRN medications  PRN medications: acetaminophen **OR** acetaminophen **OR** acetaminophen, albuterol, heparin, ondansetron **OR** ondansetron, oxygen    Infusions:heparin, 0-4,500 Units/hr, Last Rate: 900 Units/hr (12/03/24 0400)  lactated Ringer's, 75 mL/hr, Last Rate: 75 mL/hr (12/03/24 0442)        DIAGNOSTIC DATA:  Results for orders placed or performed during the hospital encounter of 12/01/24 (from the past 24 hours)   Ammonia   Result Value Ref Range    Ammonia 43 16 - 53 umol/L   Troponin I, High Sensitivity   Result Value Ref Range    Troponin I, High Sensitivity 84 (HH) 0 - 20 ng/L   B-Type Natriuretic Peptide   Result Value Ref Range     (H) 0 - 99 pg/mL   Sars-CoV-2 PCR   Result Value Ref Range    Coronavirus 2019, PCR Not Detected Not Detected   Influenza A, and B PCR   Result Value Ref Range    Flu A Result Not Detected Not Detected    Flu B Result Not Detected Not Detected   Lactate   Result Value Ref Range    Lactate 2.1 (H) 0.4 - 2.0 mmol/L   Heparin Assay, UFH   Result Value Ref Range    Heparin Unfractionated <0.1 See Comment Below for Therapeutic Ranges IU/mL   Transthoracic Echo (TTE) Complete   Result Value Ref Range    LV EF 70 %    RVSP 31.8 mmHg    LV A4C EF 59.1    BLOOD GAS ARTERIAL FULL PANEL   Result Value  Ref Range    POCT pH, Arterial 7.46 (H) 7.38 - 7.42 pH    POCT pCO2, Arterial 45 (H) 38 - 42 mm Hg    POCT pO2, Arterial 245 (H) 85 - 95 mm Hg    POCT SO2, Arterial 100 94 - 100 %    POCT Oxy Hemoglobin, Arterial 97.2 94.0 - 98.0 %    POCT Hematocrit Calculated, Arterial 41.0 41.0 - 52.0 %    POCT Sodium, Arterial 142 136 - 145 mmol/L    POCT Potassium, Arterial 3.8 3.5 - 5.3 mmol/L    POCT Chloride, Arterial 109 (H) 98 - 107 mmol/L    POCT Ionized Calcium, Arterial 1.25 1.10 - 1.33 mmol/L    POCT Glucose, Arterial 108 (H) 74 - 99 mg/dL    POCT Lactate, Arterial 1.4 0.4 - 2.0 mmol/L    POCT Base Excess, Arterial 7.1 (H) -2.0 - 3.0 mmol/L    POCT HCO3 Calculated, Arterial 32.0 (H) 22.0 - 26.0 mmol/L    POCT Hemoglobin, Arterial 13.6 13.5 - 17.5 g/dL    POCT Anion Gap, Arterial 5 (L) 10 - 25 mmo/L    Patient Temperature      FiO2 33 %    Apparatus CANNULA    Heparin Assay   Result Value Ref Range    Heparin Unfractionated 0.2 See Comment Below for Therapeutic Ranges IU/mL   Lactate   Result Value Ref Range    Lactate 4.8 (HH) 0.4 - 2.0 mmol/L   Lactate   Result Value Ref Range    Lactate 2.6 (H) 0.4 - 2.0 mmol/L   Comprehensive metabolic panel   Result Value Ref Range    Glucose 86 74 - 99 mg/dL    Sodium 146 (H) 136 - 145 mmol/L    Potassium 3.8 3.5 - 5.3 mmol/L    Chloride 107 98 - 107 mmol/L    Bicarbonate 29 21 - 32 mmol/L    Anion Gap 14 10 - 20 mmol/L    Urea Nitrogen 53 (H) 6 - 23 mg/dL    Creatinine 0.74 0.50 - 1.30 mg/dL    eGFR 90 >60 mL/min/1.73m*2    Calcium 8.3 (L) 8.6 - 10.3 mg/dL    Albumin 2.8 (L) 3.4 - 5.0 g/dL    Alkaline Phosphatase 48 33 - 136 U/L    Total Protein 5.1 (L) 6.4 - 8.2 g/dL    AST 62 (H) 9 - 39 U/L    Bilirubin, Total 1.1 0.0 - 1.2 mg/dL    ALT 32 10 - 52 U/L   Magnesium   Result Value Ref Range    Magnesium 2.21 1.60 - 2.40 mg/dL   Heparin Assay, UFH   Result Value Ref Range    Heparin Unfractionated 0.4 See Comment Below for Therapeutic Ranges IU/mL   Magnesium   Result Value Ref  Range    Magnesium 2.24 1.60 - 2.40 mg/dL   Phosphorus   Result Value Ref Range    Phosphorus 2.9 2.5 - 4.9 mg/dL   Comprehensive Metabolic Panel   Result Value Ref Range    Glucose 92 74 - 99 mg/dL    Sodium 147 (H) 136 - 145 mmol/L    Potassium 3.7 3.5 - 5.3 mmol/L    Chloride 109 (H) 98 - 107 mmol/L    Bicarbonate 29 21 - 32 mmol/L    Anion Gap 13 10 - 20 mmol/L    Urea Nitrogen 51 (H) 6 - 23 mg/dL    Creatinine 0.71 0.50 - 1.30 mg/dL    eGFR >90 >60 mL/min/1.73m*2    Calcium 8.3 (L) 8.6 - 10.3 mg/dL    Albumin 2.7 (L) 3.4 - 5.0 g/dL    Alkaline Phosphatase 46 33 - 136 U/L    Total Protein 5.0 (L) 6.4 - 8.2 g/dL    AST 60 (H) 9 - 39 U/L    Bilirubin, Total 1.1 0.0 - 1.2 mg/dL    ALT 30 10 - 52 U/L   CBC   Result Value Ref Range    WBC 9.8 4.4 - 11.3 x10*3/uL    nRBC 0.0 0.0 - 0.0 /100 WBCs    RBC 3.78 (L) 4.50 - 5.90 x10*6/uL    Hemoglobin 11.4 (L) 13.5 - 17.5 g/dL    Hematocrit 36.8 (L) 41.0 - 52.0 %    MCV 97 80 - 100 fL    MCH 30.2 26.0 - 34.0 pg    MCHC 31.0 (L) 32.0 - 36.0 g/dL    RDW 13.9 11.5 - 14.5 %    Platelets 140 (L) 150 - 450 x10*3/uL   Heparin Assay   Result Value Ref Range    Heparin Unfractionated 0.3 See Comment Below for Therapeutic Ranges IU/mL        Results for orders placed during the hospital encounter of 12/01/24    Transthoracic Echo (TTE) Complete    Narrative  [facility]  [address], [facility] [address]  Tel [phone] Fax [phone]    TRANSTHORACIC ECHOCARDIOGRAM REPORT    Patient Name:       ADDY Jamil Physician:    [id number] Andrzej Mae MD  Study Date:         12/2/2024            Ordering Provider:    [id number] MEAGHAN JOEL  MRN/PID:            [id number]             Fellow:  Accession#:         [id number]         Nurse:  Date of Birth/Age:  1940 / 84 years Sonographer:          Coreen Pathak RDCS  Gender Assigned at  M                    Additional Staff:  Birth:  Height:             177.80 cm            Admit Date:            12/2/2024  Weight:             50.80 kg             Admission Status:     Inpatient -  Priority  discharge  BSA / BMI:          1.63 m2 / 16.07      Department Location:  [facility] ICU Back  kg/m2                                      (27-34)  Blood Pressure: 131 /73 mmHg    Study Type:    TRANSTHORACIC ECHO (TTE) COMPLETE  Diagnosis/ICD: Other pulmonary embolism without acute cor pulmonale-I26.99  Indication:    PE  CPT Codes:     Echo Complete w Full Doppler-06702; Echo Limited-89900  Study Detail: The following Echo studies were performed: 2D, M-Mode, Doppler and  color flow. Technically challenging study due to poor acoustic  windows and body habitus. Agitated saline used as a contrast agent  for intraseptal flow evaluation.      PHYSICIAN INTERPRETATION:  Left Ventricle: Left ventricular ejection fraction is normal, by visual estimate at 70%. There are no regional wall motion abnormalities. The left ventricular cavity size is decreased. The interventricular septum is flattened in systole and diastole, consistent with right ventricular pressure and volume overload. Left ventricular diastolic filling was indeterminate.  Left Atrium: The left atrium is mildly dilated. A bubble study using agitated saline was performed. Bubble study is negative.  Right Ventricle: The right ventricle is moderately enlarged. There is mildly reduced right ventricular systolic function.  Right Atrium: The right atrium is moderately dilated.  Aortic Valve: The aortic valve is trileaflet. There is moderate aortic valve cusp calcification. There is evidence of mildly elevated transaortic gradients consistent with sclerosis of the aortic valve.  There is no evidence of aortic valve regurgitation.  Mitral Valve: The mitral valve is mildly thickened. There is trace mitral valve regurgitation.  Tricuspid Valve: The tricuspid valve is structurally normal. There is mild tricuspid regurgitation. The Doppler estimated RVSP is within normal limits at  31.8 mmHg.  Pulmonic Valve: The pulmonic valve is not well visualized. There is no indication of pulmonic valve regurgitation.  Pericardium: Small pericardial effusion. There is no evidence of cardiac tamponade.  Aorta: The aortic root is normal.  Systemic Veins: The inferior vena cava appears normal in size.      CONCLUSIONS:  1. Left ventricular ejection fraction is normal, by visual estimate at 70%.  2. Left ventricular diastolic filling was indeterminate.  3. Left ventricular cavity size is decreased.  4. Right ventricular volume and pressure overload.  5. There is mildly reduced right ventricular systolic function.  6. Moderately enlarged right ventricle.  7. The right atrium is moderately dilated.  8. There is no evidence of cardiac tamponade.  9. Right ventricular systolic pressure is within normal limits.  10. Aortic valve sclerosis.  11. There is moderate aortic valve cusp calcification.    QUANTITATIVE DATA SUMMARY:    LV SYSTOLIC FUNCTION BY 2D PLANIMETRY (MOD):  Normal Ranges:  EF-A4C View:    59 % (>=55%)  EF-Visual:      70 %  LV EF Reported: 70 %      TRICUSPID VALVE/RVSP:          Normal Ranges:  Peak TR Velocity:     2.69 m/s  RV Syst Pressure:     32 mmHg  (< 30mmHg)      [id number] Andrzej Mae MD  Electronically signed on 12/2/2024 at 5:09:09 PM        ** Final **         Andrzej Mae MD

## 2024-12-03 NOTE — CARE PLAN
Pt maintained safety throughout the shift. VS stable. Remained NSR, maintained 3L NC. No SOB or episodes of desatting. Pt remained AO x1 during the shift, only oriented to person/self. Requires frequent reorientation, pt somewhat re-directable. No complaints of pain during the night. Tolerated thickened liquids, tolerated well. Strict bedrest maintained per order. Heparin assay running at 9mL/hr. Had 2 therapeutic assays consecutively. Next assay for 12/4 a.m. draw placed. Compliant with medications. Safety maintained.      Problem: Pain - Adult  Goal: Verbalizes/displays adequate comfort level or baseline comfort level  12/3/2024 0613 by Raissa Johnson RN  Outcome: Progressing     Problem: Safety - Adult  Goal: Free from fall injury  Recent Flowsheet Documentation  Taken 12/3/2024 0311 by Raissa Johnson RN  Free from fall injury: Instruct family/caregiver on patient safety     Problem: Discharge Planning  Goal: Discharge to home or other facility with appropriate resources  12/3/2024 0613 by Raissa Johnson RN  Outcome: Progressing     Problem: Chronic Conditions and Co-morbidities  Goal: Patient's chronic conditions and co-morbidity symptoms are monitored and maintained or improved  12/3/2024 0613 by Raissa Johnson RN  Outcome: Progressing     Problem: Safety - Adult  Goal: Free from fall injury  12/3/2024 0613 by Raissa Johnson RN  Outcome: Met     Problem: Fall/Injury  Goal: Not fall by end of shift  Outcome: Met     Problem: Fall/Injury  Goal: Be free from injury by end of the shift  Outcome: Met     Problem: Fall/Injury  Goal: Verbalize understanding of risk factor reduction measures to prevent injury from fall in the home  Outcome: Progressing     Problem: Skin  Goal: Prevent/manage excess moisture  Recent Flowsheet Documentation  Taken 12/3/2024 0311 by Raissa Johnson RN  Prevent/manage excess moisture:   Monitor for/manage infection if present   Moisturize dry skin   Cleanse incontinence/protect  with barrier cream     Problem: Skin  Goal: Promote skin healing  Recent Flowsheet Documentation  Taken 12/3/2024 0311 by Raissa Johnson RN  Promote skin healing:   Assess skin/pad under line(s)/device(s)   Protective dressings over bony prominences   Turn/reposition every 2 hours/use positioning/transfer devices     Problem: Skin  Goal: Prevent/manage excess moisture  12/3/2024 0613 by Raissa Johnson RN  Outcome: Progressing     Problem: Skin  Goal: Prevent/minimize sheer/friction injuries  12/3/2024 0613 by Raissa Johnson RN  Outcome: Progressing     Problem: Nutrition  Goal: Electrolytes WNL  Outcome: Progressing     Problem: Nutrition  Goal: Lab values WNL  Outcome: Progressing     Problem: Nutrition  Goal: Promote healing  Outcome: Progressing     Problem: Nutrition  Goal: Maintain stable weight  Outcome: Progressing    The clinical goals for the shift include Pt will maintain safety throughout the shift.

## 2024-12-03 NOTE — PROGRESS NOTES
"Nutrition Initial Assessment:   Nutrition Assessment    Reason for Assessment: Admission nursing screening (MST score 3 for wt loss and decreased appetite.)    Patient is a 84 y.o. male presenting from home alone for pulmonary embolism on right. Cardiology, Neurology and Nephrology on consult. SLP eval 12/2 w/ recommendation for pureed solids, no straws and honey thick liquids. Pt down 3 days at home PTA and w/rhabdo. MBSS completed today and changed diet orders to NPO. Pt daughter present and provided hx as pt unable to provide hx.     Past Medical History   has a past medical history of AAA (abdominal aortic aneurysm) (CMS-HCC), BPH (benign prostatic hyperplasia), Cognitive impairment, COPD (chronic obstructive pulmonary disease) (Multi), ETOH abuse, Falls, GERD (gastroesophageal reflux disease), HLD (hyperlipidemia), Hypertension, Non-compliance, Pericardial effusion ([facility]), Pulmonary embolism, Pulmonary nodules, Stroke (Multi), and SVT (supraventricular tachycardia) (CMS-HCC).  Surgical History   has a past surgical history that includes Cervical spine surgery and Carotid endarterectomy.        Nutrition History:  Food and Nutrient History: Pt w/regular diet at home per daughter. Daughter would shop and drop off food 1x/week. Daughter notes appetite improved after pt started an appetite stimulant 2-3 months ago. Pt provided w/5 [employer] prepared meals and 2 salads per week, along w/4 bananas, grapes every 2 weeks and pie. Pt drank Ensure plus until ~2 weeks ago when he thought it was causing him \"BM accidents\". Pt has been drinking V8 recently. Daughter unsure how many meals a day pt eats, but says at least 1. Pt unable to provide any additional hx at this time.  Vitamin/Herbal Supplement Use: Remeron  Food Allergies/Intolerances:  None  GI Symptoms:  loose stools PTA per daughter  Oral Problems: Swallowing difficulty made NPO today per SLP       Anthropometrics:  Height: 177.8 cm (5' 10\")   Weight: 53.1 " kg (117 lb 1 oz)   BMI (Calculated): 16.8             Weight History:   Wt Readings from Last 22 Encounters:   12/03/24 53.1 kg (117 lb 1 oz)   10/12/23 59 kg (130 lb)       Weight Change %:  Weight History / % Weight Change: per archives 8/17/24 115#, 3/8/24 127#. Per daughter, pt was down to 105# and has been on an appetite stimulant the past 2-3 months. Pt wt at home recently was 112#. Wt loss of 7.9% x 9 months.  Significant Weight Loss: No    Nutrition Focused Physical Exam Findings:    Subcutaneous Fat Loss:   Orbital Fat Pads: Severe (dark circles, hollowing and loose skin)  Buccal Fat Pads: Severe (hollow, sunken and narrow face)  Triceps: Severe (negligible fat tissue)  Muscle Wasting:  Temporalis: Severe (hollowed scooping depression)  Pectoralis (Clavicular Region): Severe (protruding prominent clavicle)  Deltoid/Trapezius: Severe (squared shoulders, acromion process prominent)  Interosseous: Severe (depressed area between thumb and forefinger)  Quadriceps: Severe (depressions on inner and outer thigh)  Gastrocnemius: Defer  Edema:  Edema Location: LUE non-pitting  Physical Findings:  Skin: Positive (pale, warm, dry, flaky, bruising, hip left wound and perineum wound)    Nutrition Significant Labs:  CBC Trend:   Results from last 7 days   Lab Units 12/03/24  0346 12/02/24  0550 12/01/24  1223   WBC AUTO x10*3/uL 9.8 18.5* 20.9*   RBC AUTO x10*6/uL 3.78* 4.57 4.91   HEMOGLOBIN g/dL 11.4* 13.7 14.6   HEMATOCRIT % 36.8* 45.4 46.3   MCV fL 97 99 94   PLATELETS AUTO x10*3/uL 140* 155 206    , BMP Trend:   Results from last 7 days   Lab Units 12/03/24  0346 12/02/24  2304 12/02/24  0550 12/01/24  1223   GLUCOSE mg/dL 92 86 83 118*   CALCIUM mg/dL 8.3* 8.3* 8.7 9.5   SODIUM mmol/L 147* 146* 146* 147*   POTASSIUM mmol/L 3.7 3.8 4.1 4.6   CO2 mmol/L 29 29 28 33*   CHLORIDE mmol/L 109* 107 106 105   BUN mg/dL 51* 53* 51* 56*   CREATININE mg/dL 0.71 0.74 0.76 0.97    , A1C:  Lab Results   Component Value Date     "HGBA1C 5.5 07/16/2024   , BG POCT trend:    , Liver Function Trend:   Results from last 7 days   Lab Units 12/03/24 0346 12/02/24  2304 12/01/24  1223   ALK PHOS U/L 46 48 75   AST U/L 60* 62* 126*   ALT U/L 30 32 43   BILIRUBIN TOTAL mg/dL 1.1 1.1 1.3*    , Renal Lab Trend:   Results from last 7 days   Lab Units 12/03/24 0346 12/02/24 2304 12/02/24  0550 12/02/24  0550 12/01/24  1223   POTASSIUM mmol/L 3.7 3.8  --  4.1 4.6   PHOSPHORUS mg/dL 2.9  --   --   --   --    SODIUM mmol/L 147* 146*  --  146* 147*   MAGNESIUM mg/dL 2.24 2.21   < >  --   --    EGFR mL/min/1.73m*2 >90 90  --  90 78   BUN mg/dL 51* 53*  --  51* 56*   CREATININE mg/dL 0.71 0.74  --  0.76 0.97    < > = values in this interval not displayed.    , TPN/PPN Labs:   Results from last 7 days   Lab Units 12/03/24 0346 12/02/24 2304 12/02/24  0550 12/02/24  0550 12/01/24  1223   GLUCOSE mg/dL 92 86  --  83 118*   POTASSIUM mmol/L 3.7 3.8  --  4.1 4.6   PHOSPHORUS mg/dL 2.9  --   --   --   --    MAGNESIUM mg/dL 2.24 2.21   < >  --   --    SODIUM mmol/L 147* 146*  --  146* 147*   CHLORIDE mmol/L 109* 107  --  106 105   ALT U/L 30 32  --   --  43   AST U/L 60* 62*  --   --  126*   ALK PHOS U/L 46 48  --   --  75   BILIRUBIN TOTAL mg/dL 1.1 1.1  --   --  1.3*    < > = values in this interval not displayed.    , Lipid Panel: No results found for: \"CHOL\", \"HDL\", \"CHHDL\", \"LDLF\", \"VLDL\", \"TRIG\" , Vit D: No results found for: \"VITD25\" , Vit B12: No results found for: \"PKUXJYMG93\" , Iron Panel: No results found for: \"IRON\", \"TIBC\", \"FERRITIN\" , Folate: No results found for: \"FOLATE\"     Nutrition Specific Medications:  Scheduled medications  atorvastatin, 20 mg, oral, Nightly  budesonide, 0.5 mg, nebulization, BID  clopidogrel, 75 mg, oral, Daily  [Held by provider] donepezil, 10 mg, oral, Nightly  formoterol, 20 mcg, nebulization, BID  ipratropium, 0.5 mg, nebulization, TID  lactated Ringer's, 1,000 mL, intravenous, Once  [Held by provider] mirtazapine, " 7.5 mg, oral, Nightly  piperacillin-tazobactam, 3.375 g, intravenous, q8h  potassium chloride CR, 10 mEq, oral, Once  sennosides-docusate sodium, 2 tablet, oral, BID  tamsulosin, 0.4 mg, oral, Nightly      Continuous medications  heparin, 0-4,500 Units/hr, Last Rate: 900 Units/hr (12/03/24 1415)      PRN medications  PRN medications: acetaminophen **OR** acetaminophen **OR** acetaminophen, albuterol, heparin, ondansetron **OR** ondansetron, oxygen  0-10 (Numeric) Pain Score: 0 - No pain  Critical-Care Pain Observation Score:  [0]       I/O:   Last BM Date: 12/01/24;      Dietary Orders (From admission, onward)       Start     Ordered    12/03/24 1239  NPO Diet; Effective now  Diet effective now        Comments: May have 2-3 small, single ice chips AFTER ORAL CARE    12/03/24 1239    12/02/24 0638  May Participate in Room Service With Assistance  ( ROOM SERVICE MAY PARTICIPATE WITH ASSISTANCE)  Once        Question:  .  Answer:  Yes    12/02/24 0637                     Estimated Needs:   Total Energy Estimated Needs (kCal):  (3769-3573 (30-35kcal/kg))     Total Protein Estimated Needs (g):  (69-95 (1.3-1.8g/kg))     Total Fluid Estimated Needs (mL):  (1mL/kcal)           Nutrition Diagnosis   Malnutrition Diagnosis  Patient has Malnutrition Diagnosis: Yes  Diagnosis Status: New  Malnutrition Diagnosis: Severe malnutrition related to acute disease or injury  As Evidenced by: severe muscle wasting, severe subcutaneous fat loss, pt consuming <50% of estimated energy needs x 5 days and pt low BMI 16.80kg/m2.            Nutrition Interventions/Recommendations         Nutrition Prescription:  Individualized Nutrition Prescription Provided for : Diet: continue w/SLP recommendations for NPO. Pending plan of care, consider enteral nutrition. If able to advance diet, suggest Regular diet w/SLP recommendation for textures and consistencies.        Nutrition Interventions:   Interventions: Enteral intake  Enteral Intake:  Insert enteral feeding tube  Goal: Pending plan of care, suggest Corpak insertion with initiation of enteral nutrition with Jevity 1.5 at an initial rate of 10mL/hr continuous. Increase by 10mL every 8-10 hours as tolerated to an initial goal rate of 35mL/hr continuous to provide 1260kcal, 54g pro and 638mL formula free water. After 24 hours at 35mL/hr, suggest increasing to final goal rate of 50mL/hr to provide 1800kcal, 77g pro and 912mL formula free water. Adjust water flush pending fluid needs. Consider a minimum water flush of 150mL 6x/day to provide 1812mL/d free water (formula + flush).    Collaboration and Referral of Nutrition Care: Collaboration by nutrition professional with other providers  Goal: JACQUES Grady,  JOVANI LORENZO and Epic nell w/MIKE Arevalo    Nutrition Education:   Pt not appropriate.        Nutrition Monitoring and Evaluation   Food/Nutrient Related History Monitoring  Monitoring and Evaluation Plan: Energy intake, Enteral and parenteral nutrition intake  Energy Intake: Estimated energy intake  Criteria: will meet 75% of estimated energy needs w/meals or enteral nutrition  Enteral and Parenteral Nutrition Intake: Enteral nutrition intake  Criteria: will meet nutrition support goals in the next 48 hours    Body Composition/Growth/Weight History  Monitoring and Evaluation Plan: Weight change  Weight Change: Weight gain  Criteria: towards IBW    Biochemical Data, Medical Tests and Procedures  Monitoring and Evaluation Plan: Electrolyte/renal panel, Glucose/endocrine profile  Electrolyte and Renal Panel: Sodium, Potassium, Phosphorus, Magnesium, Creatinine  Criteria: WNR  Glucose/Endocrine Profile: Glucose, casual  Criteria: BG 70-180mg/dL    Nutrition Focused Physical Findings  Monitoring and Evaluation Plan: Skin  Skin: Impaired wound healing  Criteria: promote healing         Time Spent (min): 60 minutes  Follow up 3-5 days  Last RD note 12/3/24

## 2024-12-03 NOTE — PROGRESS NOTES
Lorne Avina is a 84 y.o. male on day 1 of admission presenting with Pulmonary embolism on right (Multi).      Subjective   84 y.o. male   PMH; HTN, HLD, infrarenal AAA, Hx of SVT s/p ablation, CVA with residual left-sided weakness, chronic hypoxic respiratory failure 2/2 COPD on 2 L NC prn, BPH and mild cognitive impairment    Patient is feeling well, no N/V, no CP or SOB.          Objective          Vitals 24HR  Heart Rate:  []   Temp:  [36.1 °C (97 °F)-36.5 °C (97.7 °F)]   Resp:  [11-21]   BP: ()/(54-79)   Weight:  [53.1 kg (117 lb 1 oz)]   SpO2:  [78 %-100 %]     Intake/Output last 3 Shifts:    Intake/Output Summary (Last 24 hours) at 12/3/2024 0933  Last data filed at 12/2/2024 1700  Gross per 24 hour   Intake 759.17 ml   Output --   Net 759.17 ml       Physical Exam  GENERAL: No acute distress.   SKIN: Skin color, texture, turgor normal. No rashes or lesions.  EYES: PERRLA, EOMI  HEENT: Head: Normocephalic  Neck: supple and no adenopathy  LUNGS: Lungs clear to auscultation.   CARDIAC: Normal S1 and S2; no rubs, murmurs, or gallops  ABDOMEN: Abdomen soft, non-tender.   EXTREMITIES: No ulcers, Extremities normal.   NEURO: Responsive.     Relevant Results             Scheduled medications  atorvastatin, 20 mg, oral, Nightly  budesonide, 0.5 mg, nebulization, BID  clopidogrel, 75 mg, oral, Daily  [Held by provider] donepezil, 10 mg, oral, Nightly  formoterol, 20 mcg, nebulization, BID  ipratropium, 0.5 mg, nebulization, TID  [Held by provider] mirtazapine, 7.5 mg, oral, Nightly  piperacillin-tazobactam, 3.375 g, intravenous, q8h  sennosides-docusate sodium, 2 tablet, oral, BID  tamsulosin, 0.4 mg, oral, Nightly      Continuous medications  heparin, 0-4,500 Units/hr, Last Rate: 900 Units/hr (12/03/24 0400)  lactated Ringer's, 75 mL/hr, Last Rate: 75 mL/hr (12/03/24 0442)      PRN medications  PRN medications: acetaminophen **OR** acetaminophen **OR** acetaminophen, albuterol, heparin, ondansetron **OR**  ondansetron, oxygen  Results for orders placed or performed during the hospital encounter of 12/01/24 (from the past 24 hours)   Ammonia   Result Value Ref Range    Ammonia 43 16 - 53 umol/L   Troponin I, High Sensitivity   Result Value Ref Range    Troponin I, High Sensitivity 84 (HH) 0 - 20 ng/L   B-Type Natriuretic Peptide   Result Value Ref Range     (H) 0 - 99 pg/mL   Sars-CoV-2 PCR   Result Value Ref Range    Coronavirus 2019, PCR Not Detected Not Detected   Influenza A, and B PCR   Result Value Ref Range    Flu A Result Not Detected Not Detected    Flu B Result Not Detected Not Detected   Lactate   Result Value Ref Range    Lactate 2.1 (H) 0.4 - 2.0 mmol/L   Heparin Assay, UFH   Result Value Ref Range    Heparin Unfractionated <0.1 See Comment Below for Therapeutic Ranges IU/mL   Transthoracic Echo (TTE) Complete   Result Value Ref Range    LV EF 70 %    RVSP 31.8 mmHg    LV A4C EF 59.1    BLOOD GAS ARTERIAL FULL PANEL   Result Value Ref Range    POCT pH, Arterial 7.46 (H) 7.38 - 7.42 pH    POCT pCO2, Arterial 45 (H) 38 - 42 mm Hg    POCT pO2, Arterial 245 (H) 85 - 95 mm Hg    POCT SO2, Arterial 100 94 - 100 %    POCT Oxy Hemoglobin, Arterial 97.2 94.0 - 98.0 %    POCT Hematocrit Calculated, Arterial 41.0 41.0 - 52.0 %    POCT Sodium, Arterial 142 136 - 145 mmol/L    POCT Potassium, Arterial 3.8 3.5 - 5.3 mmol/L    POCT Chloride, Arterial 109 (H) 98 - 107 mmol/L    POCT Ionized Calcium, Arterial 1.25 1.10 - 1.33 mmol/L    POCT Glucose, Arterial 108 (H) 74 - 99 mg/dL    POCT Lactate, Arterial 1.4 0.4 - 2.0 mmol/L    POCT Base Excess, Arterial 7.1 (H) -2.0 - 3.0 mmol/L    POCT HCO3 Calculated, Arterial 32.0 (H) 22.0 - 26.0 mmol/L    POCT Hemoglobin, Arterial 13.6 13.5 - 17.5 g/dL    POCT Anion Gap, Arterial 5 (L) 10 - 25 mmo/L    Patient Temperature      FiO2 33 %    Apparatus CANNULA    Heparin Assay   Result Value Ref Range    Heparin Unfractionated 0.2 See Comment Below for Therapeutic Ranges IU/mL    Lactate   Result Value Ref Range    Lactate 4.8 (HH) 0.4 - 2.0 mmol/L   Lactate   Result Value Ref Range    Lactate 2.6 (H) 0.4 - 2.0 mmol/L   Comprehensive metabolic panel   Result Value Ref Range    Glucose 86 74 - 99 mg/dL    Sodium 146 (H) 136 - 145 mmol/L    Potassium 3.8 3.5 - 5.3 mmol/L    Chloride 107 98 - 107 mmol/L    Bicarbonate 29 21 - 32 mmol/L    Anion Gap 14 10 - 20 mmol/L    Urea Nitrogen 53 (H) 6 - 23 mg/dL    Creatinine 0.74 0.50 - 1.30 mg/dL    eGFR 90 >60 mL/min/1.73m*2    Calcium 8.3 (L) 8.6 - 10.3 mg/dL    Albumin 2.8 (L) 3.4 - 5.0 g/dL    Alkaline Phosphatase 48 33 - 136 U/L    Total Protein 5.1 (L) 6.4 - 8.2 g/dL    AST 62 (H) 9 - 39 U/L    Bilirubin, Total 1.1 0.0 - 1.2 mg/dL    ALT 32 10 - 52 U/L   Magnesium   Result Value Ref Range    Magnesium 2.21 1.60 - 2.40 mg/dL   Heparin Assay, UFH   Result Value Ref Range    Heparin Unfractionated 0.4 See Comment Below for Therapeutic Ranges IU/mL   Magnesium   Result Value Ref Range    Magnesium 2.24 1.60 - 2.40 mg/dL   Phosphorus   Result Value Ref Range    Phosphorus 2.9 2.5 - 4.9 mg/dL   Comprehensive Metabolic Panel   Result Value Ref Range    Glucose 92 74 - 99 mg/dL    Sodium 147 (H) 136 - 145 mmol/L    Potassium 3.7 3.5 - 5.3 mmol/L    Chloride 109 (H) 98 - 107 mmol/L    Bicarbonate 29 21 - 32 mmol/L    Anion Gap 13 10 - 20 mmol/L    Urea Nitrogen 51 (H) 6 - 23 mg/dL    Creatinine 0.71 0.50 - 1.30 mg/dL    eGFR >90 >60 mL/min/1.73m*2    Calcium 8.3 (L) 8.6 - 10.3 mg/dL    Albumin 2.7 (L) 3.4 - 5.0 g/dL    Alkaline Phosphatase 46 33 - 136 U/L    Total Protein 5.0 (L) 6.4 - 8.2 g/dL    AST 60 (H) 9 - 39 U/L    Bilirubin, Total 1.1 0.0 - 1.2 mg/dL    ALT 30 10 - 52 U/L   CBC   Result Value Ref Range    WBC 9.8 4.4 - 11.3 x10*3/uL    nRBC 0.0 0.0 - 0.0 /100 WBCs    RBC 3.78 (L) 4.50 - 5.90 x10*6/uL    Hemoglobin 11.4 (L) 13.5 - 17.5 g/dL    Hematocrit 36.8 (L) 41.0 - 52.0 %    MCV 97 80 - 100 fL    MCH 30.2 26.0 - 34.0 pg    MCHC 31.0 (L) 32.0  - 36.0 g/dL    RDW 13.9 11.5 - 14.5 %    Platelets 140 (L) 150 - 450 x10*3/uL   Heparin Assay   Result Value Ref Range    Heparin Unfractionated 0.3 See Comment Below for Therapeutic Ranges IU/mL         Assessment/Plan   This patient currently has cardiac telemetry ordered; if you would like to modify or discontinue the telemetry order, click here to go to the orders activity to modify/discontinue the order.    Assessment & Plan  Pulmonary embolism on right (Multi)    PE (pulmonary thromboembolism) (Multi)    HTN (hypertension)    Incidental pulmonary nodule    Chronic hypoxic respiratory failure, on home oxygen therapy (Multi)    Rhabdomyolysis    Elevated troponin    Elevated brain natriuretic peptide (BNP) level    Leukocytosis    Metabolic encephalopathy    // PE (pulmonary thromboembolism) (Multi)  Supplemental oxygen, goal O2 sat greater than 90%  Continuous telemetry monitoring  Incentive spirometry every hour while awake  Continue with close neurovascular monitoring  AC. Transition to PO when on PO diet.   MBS today.     D/W daughter at the bedside; stated, confusion is better but not back to baseline, also weak.     // Elevated BUN   - Check cystain C  - Check Cr Cl.     // Lactic acidosis  - Blood cultures.   - Zosyn.   - dc tomorrow if cultures are negative.      // Acute change in mental status.   - CT brain is negative.   - Ammonia normal.   - No CO2 retention.   - back to baseline.   - Neurology note reviewed.      // HTN (hypertension)  Home meds Plavix, Coreg, and Lipitor     // Elevated HDL  Home Lipitor  Heart Healthy diet when cleared to take p.o.     // Incidental pulmonary nodule  Again noted are approximately 3 nodular lesions involving the left upper lobe, left lower lobe and right lower lobe unchanged from exam from the same day measuring up to approximately 2 cm involving the left upper lobe and left lower lobe.  Findings concerning for possible neoplasm.    Recommend further evaluation  with PET/CT.     // Chronic hypoxic respiratory failure, on home oxygen therapy (Multi)  Home oxygen PRN 2 L NC   Resume Home inhalers  Monitor oxygen requirements       Obey Jennings MD

## 2024-12-04 LAB
ALBUMIN SERPL BCP-MCNC: 2.6 G/DL (ref 3.4–5)
ALP SERPL-CCNC: 44 U/L (ref 33–136)
ALT SERPL W P-5'-P-CCNC: 28 U/L (ref 10–52)
ANION GAP SERPL CALC-SCNC: 10 MMOL/L (ref 10–20)
ANION GAP SERPL CALC-SCNC: 9 MMOL/L (ref 10–20)
AST SERPL W P-5'-P-CCNC: 45 U/L (ref 9–39)
BILIRUB SERPL-MCNC: 1 MG/DL (ref 0–1.2)
BUN SERPL-MCNC: 33 MG/DL (ref 6–23)
BUN SERPL-MCNC: 35 MG/DL (ref 6–23)
CALCIUM SERPL-MCNC: 8.2 MG/DL (ref 8.6–10.3)
CALCIUM SERPL-MCNC: 8.2 MG/DL (ref 8.6–10.3)
CHLORIDE SERPL-SCNC: 110 MMOL/L (ref 98–107)
CHLORIDE SERPL-SCNC: 111 MMOL/L (ref 98–107)
CK SERPL-CCNC: 466 U/L (ref 0–325)
CO2 SERPL-SCNC: 31 MMOL/L (ref 21–32)
CO2 SERPL-SCNC: 33 MMOL/L (ref 21–32)
COLLECT DURATION TIME SPEC: 24 HRS
CREAT 24H UR-MCNC: 124.5 MG/DL (ref 20–370)
CREAT 24H UR-MRATE: 0.75 G/24 H (ref 0.87–2.41)
CREAT SERPL-MCNC: 0.56 MG/DL (ref 0.5–1.3)
CREAT SERPL-MCNC: 0.59 MG/DL (ref 0.5–1.3)
EGFRCR SERPLBLD CKD-EPI 2021: >90 ML/MIN/1.73M*2
EGFRCR SERPLBLD CKD-EPI 2021: >90 ML/MIN/1.73M*2
ERYTHROCYTE [DISTWIDTH] IN BLOOD BY AUTOMATED COUNT: 13.7 % (ref 11.5–14.5)
FERRITIN SERPL-MCNC: 699 NG/ML (ref 20–300)
FOLATE SERPL-MCNC: 7.1 NG/ML
GLUCOSE SERPL-MCNC: 90 MG/DL (ref 74–99)
GLUCOSE SERPL-MCNC: 96 MG/DL (ref 74–99)
HCT VFR BLD AUTO: 34 % (ref 41–52)
HGB BLD-MCNC: 10.5 G/DL (ref 13.5–17.5)
HOLD SPECIMEN: NORMAL
IRON SATN MFR SERPL: 21 % (ref 25–45)
IRON SERPL-MCNC: 29 UG/DL (ref 35–150)
MAGNESIUM SERPL-MCNC: 1.96 MG/DL (ref 1.6–2.4)
MCH RBC QN AUTO: 30.2 PG (ref 26–34)
MCHC RBC AUTO-ENTMCNC: 30.9 G/DL (ref 32–36)
MCV RBC AUTO: 98 FL (ref 80–100)
NRBC BLD-RTO: 0 /100 WBCS (ref 0–0)
PLATELET # BLD AUTO: 119 X10*3/UL (ref 150–450)
POTASSIUM SERPL-SCNC: 3.6 MMOL/L (ref 3.5–5.3)
POTASSIUM SERPL-SCNC: 3.8 MMOL/L (ref 3.5–5.3)
PROT 24H UR-MCNC: 42 MG/DL (ref 5–25)
PROT 24H UR-MRATE: 252 MG/24H (ref 0–149)
PROT SERPL-MCNC: 4.8 G/DL (ref 6.4–8.2)
RBC # BLD AUTO: 3.48 X10*6/UL (ref 4.5–5.9)
SODIUM SERPL-SCNC: 148 MMOL/L (ref 136–145)
SODIUM SERPL-SCNC: 148 MMOL/L (ref 136–145)
SPECIMEN VOL 24H UR: 600 ML
TIBC SERPL-MCNC: 140 UG/DL (ref 240–445)
UFH PPP CHRO-ACNC: 0.3 IU/ML
UIBC SERPL-MCNC: 111 UG/DL (ref 110–370)
VIT B12 SERPL-MCNC: 541 PG/ML (ref 211–911)
WBC # BLD AUTO: 6.8 X10*3/UL (ref 4.4–11.3)

## 2024-12-04 PROCEDURE — 85520 HEPARIN ASSAY: CPT | Performed by: INTERNAL MEDICINE

## 2024-12-04 PROCEDURE — 82728 ASSAY OF FERRITIN: CPT | Performed by: NURSE PRACTITIONER

## 2024-12-04 PROCEDURE — 82607 VITAMIN B-12: CPT | Performed by: NURSE PRACTITIONER

## 2024-12-04 PROCEDURE — 80048 BASIC METABOLIC PNL TOTAL CA: CPT | Mod: CCI | Performed by: NURSE PRACTITIONER

## 2024-12-04 PROCEDURE — 82746 ASSAY OF FOLIC ACID SERUM: CPT | Performed by: NURSE PRACTITIONER

## 2024-12-04 PROCEDURE — 97162 PT EVAL MOD COMPLEX 30 MIN: CPT | Mod: GP

## 2024-12-04 PROCEDURE — 83735 ASSAY OF MAGNESIUM: CPT | Performed by: NURSE PRACTITIONER

## 2024-12-04 PROCEDURE — 82550 ASSAY OF CK (CPK): CPT | Performed by: NURSE PRACTITIONER

## 2024-12-04 PROCEDURE — 2060000001 HC INTERMEDIATE ICU ROOM DAILY

## 2024-12-04 PROCEDURE — 92526 ORAL FUNCTION THERAPY: CPT | Mod: GN

## 2024-12-04 PROCEDURE — 2500000004 HC RX 250 GENERAL PHARMACY W/ HCPCS (ALT 636 FOR OP/ED): Performed by: NURSE PRACTITIONER

## 2024-12-04 PROCEDURE — 82570 ASSAY OF URINE CREATININE: CPT | Performed by: NURSE PRACTITIONER

## 2024-12-04 PROCEDURE — 80048 BASIC METABOLIC PNL TOTAL CA: CPT | Performed by: NURSE PRACTITIONER

## 2024-12-04 PROCEDURE — 36415 COLL VENOUS BLD VENIPUNCTURE: CPT | Performed by: NURSE PRACTITIONER

## 2024-12-04 PROCEDURE — 83550 IRON BINDING TEST: CPT | Performed by: NURSE PRACTITIONER

## 2024-12-04 PROCEDURE — 99222 1ST HOSP IP/OBS MODERATE 55: CPT | Performed by: PSYCHIATRY & NEUROLOGY

## 2024-12-04 PROCEDURE — 85027 COMPLETE CBC AUTOMATED: CPT | Performed by: NURSE PRACTITIONER

## 2024-12-04 PROCEDURE — 97165 OT EVAL LOW COMPLEX 30 MIN: CPT | Mod: GO

## 2024-12-04 PROCEDURE — 2500000005 HC RX 250 GENERAL PHARMACY W/O HCPCS: Performed by: INTERNAL MEDICINE

## 2024-12-04 PROCEDURE — 2500000004 HC RX 250 GENERAL PHARMACY W/ HCPCS (ALT 636 FOR OP/ED)

## 2024-12-04 RX ORDER — POTASSIUM CHLORIDE 14.9 MG/ML
20 INJECTION INTRAVENOUS ONCE
Status: COMPLETED | OUTPATIENT
Start: 2024-12-04 | End: 2024-12-04

## 2024-12-04 RX ORDER — DEXTROSE, SODIUM CHLORIDE, SODIUM LACTATE, POTASSIUM CHLORIDE, AND CALCIUM CHLORIDE 5; .6; .31; .03; .02 G/100ML; G/100ML; G/100ML; G/100ML; G/100ML
75 INJECTION, SOLUTION INTRAVENOUS CONTINUOUS
Status: DISCONTINUED | OUTPATIENT
Start: 2024-12-04 | End: 2024-12-05

## 2024-12-04 RX ADMIN — Medication 3 L/MIN: at 08:00

## 2024-12-04 RX ADMIN — PIPERACILLIN SODIUM AND TAZOBACTAM SODIUM 3.38 G: 3; .375 INJECTION, SOLUTION INTRAVENOUS at 04:38

## 2024-12-04 RX ADMIN — SODIUM CHLORIDE, SODIUM LACTATE, POTASSIUM CHLORIDE, CALCIUM CHLORIDE AND DEXTROSE MONOHYDRATE 75 ML/HR: 5; 600; 310; 30; 20 INJECTION, SOLUTION INTRAVENOUS at 19:37

## 2024-12-04 RX ADMIN — POTASSIUM CHLORIDE 20 MEQ: 14.9 INJECTION, SOLUTION INTRAVENOUS at 12:40

## 2024-12-04 RX ADMIN — HEPARIN SODIUM 900 UNITS/HR: 10000 INJECTION, SOLUTION INTRAVENOUS at 20:25

## 2024-12-04 SDOH — ECONOMIC STABILITY: INCOME INSECURITY: IN THE PAST 12 MONTHS HAS THE ELECTRIC, GAS, OIL, OR WATER COMPANY THREATENED TO SHUT OFF SERVICES IN YOUR HOME?: NO

## 2024-12-04 SDOH — ECONOMIC STABILITY: HOUSING INSECURITY: AT ANY TIME IN THE PAST 12 MONTHS, WERE YOU HOMELESS OR LIVING IN A SHELTER (INCLUDING NOW)?: NO

## 2024-12-04 SDOH — ECONOMIC STABILITY: FOOD INSECURITY: HOW HARD IS IT FOR YOU TO PAY FOR THE VERY BASICS LIKE FOOD, HOUSING, MEDICAL CARE, AND HEATING?: NOT HARD AT ALL

## 2024-12-04 SDOH — ECONOMIC STABILITY: HOUSING INSECURITY: IN THE LAST 12 MONTHS, WAS THERE A TIME WHEN YOU WERE NOT ABLE TO PAY THE MORTGAGE OR RENT ON TIME?: NO

## 2024-12-04 SDOH — ECONOMIC STABILITY: TRANSPORTATION INSECURITY: IN THE PAST 12 MONTHS, HAS LACK OF TRANSPORTATION KEPT YOU FROM MEDICAL APPOINTMENTS OR FROM GETTING MEDICATIONS?: NO

## 2024-12-04 SDOH — ECONOMIC STABILITY: FOOD INSECURITY: WITHIN THE PAST 12 MONTHS, THE FOOD YOU BOUGHT JUST DIDN'T LAST AND YOU DIDN'T HAVE MONEY TO GET MORE.: NEVER TRUE

## 2024-12-04 SDOH — ECONOMIC STABILITY: FOOD INSECURITY: WITHIN THE PAST 12 MONTHS, YOU WORRIED THAT YOUR FOOD WOULD RUN OUT BEFORE YOU GOT THE MONEY TO BUY MORE.: NEVER TRUE

## 2024-12-04 SDOH — ECONOMIC STABILITY: HOUSING INSECURITY: IN THE PAST 12 MONTHS, HOW MANY TIMES HAVE YOU MOVED WHERE YOU WERE LIVING?: 0

## 2024-12-04 ASSESSMENT — COGNITIVE AND FUNCTIONAL STATUS - GENERAL
MOVING TO AND FROM BED TO CHAIR: TOTAL
PERSONAL GROOMING: TOTAL
TOILETING: TOTAL
STANDING UP FROM CHAIR USING ARMS: TOTAL
PERSONAL GROOMING: TOTAL
MOVING FROM LYING ON BACK TO SITTING ON SIDE OF FLAT BED WITH BEDRAILS: TOTAL
TOILETING: TOTAL
DRESSING REGULAR UPPER BODY CLOTHING: TOTAL
EATING MEALS: TOTAL
CLIMB 3 TO 5 STEPS WITH RAILING: TOTAL
MOBILITY SCORE: 6
DAILY ACTIVITIY SCORE: 6
DAILY ACTIVITIY SCORE: 6
TOILETING: TOTAL
HELP NEEDED FOR BATHING: TOTAL
HELP NEEDED FOR BATHING: TOTAL
DAILY ACTIVITIY SCORE: 6
DRESSING REGULAR UPPER BODY CLOTHING: TOTAL
TURNING FROM BACK TO SIDE WHILE IN FLAT BAD: TOTAL
MOBILITY SCORE: 6
DRESSING REGULAR LOWER BODY CLOTHING: TOTAL
TURNING FROM BACK TO SIDE WHILE IN FLAT BAD: TOTAL
WALKING IN HOSPITAL ROOM: TOTAL
MOVING FROM LYING ON BACK TO SITTING ON SIDE OF FLAT BED WITH BEDRAILS: TOTAL
EATING MEALS: TOTAL
STANDING UP FROM CHAIR USING ARMS: TOTAL
WALKING IN HOSPITAL ROOM: TOTAL
STANDING UP FROM CHAIR USING ARMS: TOTAL
HELP NEEDED FOR BATHING: TOTAL
DRESSING REGULAR UPPER BODY CLOTHING: TOTAL
EATING MEALS: TOTAL
TURNING FROM BACK TO SIDE WHILE IN FLAT BAD: TOTAL
MOVING FROM LYING ON BACK TO SITTING ON SIDE OF FLAT BED WITH BEDRAILS: TOTAL
MOVING TO AND FROM BED TO CHAIR: TOTAL
DRESSING REGULAR LOWER BODY CLOTHING: TOTAL
MOBILITY SCORE: 6
CLIMB 3 TO 5 STEPS WITH RAILING: TOTAL
PERSONAL GROOMING: TOTAL
MOVING TO AND FROM BED TO CHAIR: TOTAL
DRESSING REGULAR LOWER BODY CLOTHING: TOTAL
CLIMB 3 TO 5 STEPS WITH RAILING: TOTAL
WALKING IN HOSPITAL ROOM: TOTAL

## 2024-12-04 ASSESSMENT — PAIN SCALES - GENERAL
PAINLEVEL_OUTOF10: 0 - NO PAIN

## 2024-12-04 ASSESSMENT — PAIN - FUNCTIONAL ASSESSMENT
PAIN_FUNCTIONAL_ASSESSMENT: 0-10

## 2024-12-04 ASSESSMENT — ACTIVITIES OF DAILY LIVING (ADL)
LACK_OF_TRANSPORTATION: NO
ADL_ASSISTANCE: INDEPENDENT
LACK_OF_TRANSPORTATION: NO

## 2024-12-04 NOTE — PROGRESS NOTES
12/04/24 1117   Discharge Planning   Living Arrangements Alone   Support Systems Children;Friends/neighbors   Assistance Needed supposed to be using a walker/cane to ambulate but doesn't per daughter.   Type of Residence Private residence   Number of Stairs to Enter Residence 1   Number of Stairs Within Residence 0   Do you have animals or pets at home? Yes   Type of Animals or Pets cat   Who is requesting discharge planning? Provider   Home or Post Acute Services Post acute facilities (Rehab/SNF/etc)   Type of Post Acute Facility Services Skilled nursing   Expected Discharge Disposition SNF   Does the patient need discharge transport arranged? Yes   RoundTrip coordination needed? Yes   Has discharge transport been arranged? No   Financial Resource Strain   How hard is it for you to pay for the very basics like food, housing, medical care, and heating? Not hard   Housing Stability   In the last 12 months, was there a time when you were not able to pay the mortgage or rent on time? N   In the past 12 months, how many times have you moved where you were living? 0   At any time in the past 12 months, were you homeless or living in a shelter (including now)? N   Transportation Needs   In the past 12 months, has lack of transportation kept you from medical appointments or from getting medications? no   In the past 12 months, has lack of transportation kept you from meetings, work, or from getting things needed for daily living? No   Patient Choice   Patient / Family choosing to utilize agency / facility established prior to hospitalization No   Intensity of Service   Intensity of Service 0-30 min     Met with daughter in hallway by room. Introduced self and role in hospital. Verified address and PCP is Dr. Rosy Murillo through the [facility]. Uses [email or web address] in [place] for medications and has no issues in obtaining/paying for them and daughter manages all of his medications. PTA patient was driving and independent with his ADL's.  Denies any issues paying bills or getting food into the home. Patient was found down andit hd been about 3 days, Palliative is also on consult Therapy still needs to work with patient. We will make sure he has a safe discharge pan in place. CT team to follow patient for discharge needs.

## 2024-12-04 NOTE — PROGRESS NOTES
Occupational Therapy    Occupational Therapy    Evaluation    Patient Name: Lorne Avina  MRN: [id number]  Today's Date: 12/4/2024  Time Calculation  Start Time: 1353  Stop Time: 1402  Time Calculation (min): 9 min  2115/2115-A    Assessment  IP OT Assessment  OT Assessment:  (Pt. presents with global weakness and stiffness impacting pt. ability to complete ADLs and mobility independently)  Prognosis: Poor  Barriers to Discharge: Decreased caregiver support  Evaluation/Treatment Tolerance: Patient limited by pain, Patient limited by fatigue  End of Session Communication: Bedside nurse  End of Session Patient Position: Bed, 3 rail up, Alarm on    Plan:  Treatment Interventions: ADL retraining, Functional transfer training, UE strengthening/ROM, Endurance training, Cognitive reorientation  OT Frequency: 1 time per week (as a trial)  OT Discharge Recommendations: Moderate intensity level of continued care  OT Recommended Transfer Status: Dependent  OT - OK to Discharge: Yes (Next level of care when cleared by medical team)    Subjective   Per EMR: Lorne Avina is a 84 y.o. male with Pmhx of  HTN, HLD, infrarenal AAA, Hx of SVT s/p ablation, CVA with residual left-sided weakness, chronic hypoxic respiratory failure 2/2 COPD on 2 L NC prn, BPH and mild cognitive impairment who presented to ED after being found by daughter at home.  Believed to be down for approximately 3 days.  Patient reportedly was found to be wedged between his bed and nightstand, incontinent of urine. Upon discovery patient's daughter called 911, and patient was sent to [facility].      Upon arrival to the emergency room patient remained confused A&O x1, to self, limited ability to follow commands. A&A x 1 to self.  Patient was noted to be hypotensive 80s over 50s and tachycardic with a heart rate in the 120s with tachypnea with respiratory rate in the high 20s. Treated with 2 L normal saline pressure improved and stabilized.  Initially on  nonrebreather successfully weaned down to 2 L nasal cannula.  Given one-time dose of IV vancomycin and Zosyn. Lab work significant for leukocytosis, afebrile, elevated CK level of 2758, BNP 1067, elevated troponins of 137 and 127, D-dimer of 15,893, urine tox and alcohol tox negative. Urine significant for turbid appearance but negative for UTI.      CT of cervical spine, head, lumbar spine, thoracic spine negative.  X-ray of the femur pelvis ribs and tib-fib without significant findings.  CT angio positive for Single pulmonary embolism involving the right interlobar pulmonary artery extending into the anterobasilar segment.  Minimal embolic burden, no evidence of right heart strain.  Additional imaging reviewed without significant findings  Current Problem:  1. Pulmonary embolism on right (Multi)  Transthoracic Echo (TTE) Complete    Transthoracic Echo (TTE) Complete      2. Fall, initial encounter        3. Acute pulmonary embolism without acute cor pulmonale, unspecified pulmonary embolism type (Multi)            General:  General  Reason for Referral: ADLs, discharge planning  Referred By: Dr. Jennings  Family/Caregiver Present: No  Co-Treatment: PT  Co-Treatment Reason: Safety  Prior to Session Communication: Bedside nurse  Patient Position Received: Bed, 3 rail up, Alarm on    Precautions:  Medical Precautions: Fall precautions        Pain:  Pain Assessment  Pain Assessment: 0-10  0-10 (Numeric) Pain Score:  (Denies pain but moans with any movement)    Objective     Cognition:  Orientation Level: Disoriented to time, Disoriented to place, Disoriented to situation, Disoriented to person (Oriented to name only)  Sustained Attention: Impaired             Home Living:  Home Living Comments:  (Pt. unable to provide PLOF. Per chart, lives alone, independent, drives)     Prior Function:  Level of [place]: Independent with ADLs and functional transfers  ADL Assistance: Independent  Homemaking Assistance:  Independent  Ambulatory Assistance: Independent        ADL:  Grooming Assistance: Total  UE Dressing Assistance: Total  LE Dressing Assistance: Total    Activity Tolerance:  Endurance: Decreased tolerance for upright activites    Bed Mobility/Transfers:   Bed Mobility  Bed Mobility:  (dependent x 2 for rolling side to side)           Hand Function:  Hand Function  Gross Grasp: Impaired  Coordination: Impaired    Extremities: RUE   RUE :  (stiff and grossly weak 2/5) and LUE   LUE:  (2/5)    Outcome Measures: [facility] Daily Activity  Putting on and taking off regular lower body clothing: Total  Bathing (including washing, rinsing, drying): Total  Putting on and taking off regular upper body clothing: Total  Toileting, which includes using toilet, bedpan or urinal: Total  Taking care of personal grooming such as brushing teeth: Total  Eating Meals: Total  Daily Activity - Total Score: 6                       EDUCATION:  Education  Individual(s) Educated: Patient  Education Provided: Fall precautons  Patient Response to Education:  (Pt. unable to retain education at this time)        Goals:   Encounter Problems       Encounter Problems (Active)       Functional Balance       LTG - Patient will maintain sitting balance to allow for completion of daily activities       Start:  12/04/24    Expected End:  12/18/24               Grooming       STG - Patient completes grooming mod assist       Start:  12/04/24    Expected End:  12/18/24               OT Transfers       STG - Patient will perform bed mobility max assist       Start:  12/04/24    Expected End:  12/18/24            STG - Patient will transfer sit to and from stand max assist       Start:  12/04/24    Expected End:  12/18/24

## 2024-12-04 NOTE — PROGRESS NOTES
Physical Therapy    Physical Therapy Evaluation    Patient Name: Lorne Avina  MRN: [id number]  Today's Date: 12/4/2024   Time Calculation  Start Time: 1352  Stop Time: 1400  Time Calculation (min): 8 min  2115/2115-A    Assessment/Plan   PT Assessment  PT Assessment Results: Decreased strength, Decreased range of motion, Decreased endurance, Impaired balance, Decreased mobility, Decreased safety awareness, Decreased coordination, Decreased cognition, Impaired judgement, Pain  Evaluation/Treatment Tolerance: Patient limited by fatigue, Patient limited by pain  Medical Staff Made Aware: Yes  End of Session Communication: Bedside nurse  Assessment Comment: Pt is a 83 y/o male admitted for PE. Pt presents with cognitive decline, decreased strength, endurance and balance. Pt unable to tolerate upright activities this date. He is functioning below baseline level of function and will benefit from skilled therapy during stay to improve overall functional mobility and safety awareness. Upon discharge pt will benefit from moderate intensity therapy with 24 hr supervision 2/2 cognition for continued improvement in functional mobility.     End of Session Patient Position: Bed, 3 rail up, Alarm on (call light within reach)  IP OR SWING BED PT PLAN  Inpatient or Swing Bed: Inpatient  PT Plan  Treatment/Interventions: Bed mobility, Transfer training, Gait training, Stair training, Balance training, Neuromuscular re-education, Endurance training, Strengthening, Range of motion, Therapeutic exercise, Therapeutic activity, Home exercise program, Positioning, Postural re-education  PT Frequency: 2 times per week  PT Discharge Recommendations: Moderate intensity level of continued care, 24 hr supervision due to cognition  Equipment Recommended upon Discharge:  (TBD)  PT Recommended Transfer Status: Total assist  PT - OK to Discharge: Yes (Once medically cleared to next level of care.)    Subjective     Current Problem:  1. Pulmonary  embolism on right (Multi)  Transthoracic Echo (TTE) Complete    Transthoracic Echo (TTE) Complete      2. Fall, initial encounter        3. Acute pulmonary embolism without acute cor pulmonale, unspecified pulmonary embolism type (Multi)          Patient Active Problem List   Diagnosis    PE (pulmonary thromboembolism) (Multi)    HTN (hypertension)    Elevated HDL    Incidental pulmonary nodule    Chronic hypoxic respiratory failure, on home oxygen therapy (Multi)    Pulmonary embolism on right (Multi)    Rhabdomyolysis    Elevated troponin    Elevated brain natriuretic peptide (BNP) level    Leukocytosis    Metabolic encephalopathy     General Visit Information:  General  Reason for Referral: Presented to ED after being found by daughter at home.  Believed to be down for approximately 3 days.  Patient reportedly was found to be wedged between his bed and nightstand, incontinent of urine. Upon discovery patient's daughter called 911, and patient was sent to [facility]. Pt admitted for PE.  Referred By: Dr. Jennings  Past Medical History Relevant to Rehab: HTN, HLD, infrarenal AAA, Hx of SVT s/p ablation, CVA with residual left-sided weakness, chronic hypoxic respiratory failure 2/2 COPD on 2 L NC prn, BPH and mild cognitive impairment  Family/Caregiver Present: No  Co-Treatment: OT  Co-Treatment Reason: To maximize pt safety with functional mobility and discharge planning  Prior to Session Communication: Bedside nurse  Patient Position Received: Bed, 3 rail up, Alarm on  Preferred Learning Style: verbal  General Comment: Pt agreeable to work with therapy. Mobility limited this date 2/2 cognitive decline, fatigue and decreased tolerance to upright activites.    Home Living/PLOF:  Home Living  Home Living Comments: Pt unable to provide meaningul information about subjective hx. Per EMR pt lived at home alone and was independent. Pt drives.    Precautions:  Precautions  Medical Precautions: Fall precautions,  Oxygen therapy device and L/min (3L O2 via NC, Aspiration precautions)    Vital Signs:  Vital Signs  Vitals Session: During PT  Heart Rate: 75  Heart Rate Source: Monitor  Resp: 21  BP: 121/65  MAP (mmHg): 80  BP Location: Right arm  BP Method: Automatic  Patient Position: Lying    Objective     Pain:  Pain Assessment  Pain Assessment: 0-10  0-10 (Numeric) Pain Score:  (Pt reports no pain when asked; however pt expressing pain when moving all 4 extremities)    Cognition:  Cognition  Overall Cognitive Status: Impaired  Orientation Level: Disoriented X4 (Pt oriented to name and birth date month only)  Cognition Comments: baseline mild cognitive impairment  Attention:  (Impaired)  Memory:  (Impaired)  Safety/Judgement:  (Impaired)  Insight: Severe    General Assessments:      Activity Tolerance  Endurance: Decreased tolerance for upright activites    Sensation  Light Touch: Not tested    Strength  Strength Comments: AROM limited in all 4 extremities, unable to assess PROM as pt expressed pain with movemen tin all 4 extremities. Grossly weak in all 4 extremities. BL UE 2-/5 strength grossly and BL LEs 1/5 grossly.     Functional Assessments:     Bed Mobility  Bed Mobility: Yes  Bed Mobility 1  Bed Mobility 1: Rolling right, Rolling left  Level of Assistance 1: Dependent, +2     Outcome Measures:     [facility] Basic Mobility  Turning from your back to your side while in a flat bed without using bedrails: Total  Moving from lying on your back to sitting on the side of a flat bed without using bedrails: Total  Moving to and from bed to chair (including a wheelchair): Total  Standing up from a chair using your arms (e.g. wheelchair or bedside chair): Total  To walk in hospital room: Total  Climbing 3-5 steps with railing: Total  Basic Mobility - Total Score: 6     Goals:  Encounter Problems       Encounter Problems (Active)       Balance       Pt will demonstrate static/dynamic sitting balance for >/= 2 min max A x2 with UE/LE  support without evidence of retro/lateral leaning or LOB.        Start:  12/04/24    Expected End:  12/18/24               Mobility       Pt will complete supine, seated and standing exercises to maintain/improve overall strength with moderate verbal cues.         Start:  12/04/24    Expected End:  12/18/24               PT Transfers       Pt will be able to complete all bed mobility tasks max A x2 with use of bed HR.        Start:  12/04/24    Expected End:  12/18/24            Pt will be able to complete all transfers with mairo stedy max A x2 demonstrating good safety awareness and proper body mechanics.        Start:  12/04/24    Expected End:  12/18/24                 Education Documentation  Body Mechanics, taught by Sarah Garza PT at 12/4/2024  2:40 PM.  Learner: Patient  Readiness: Acceptance  Method: Explanation  Response: No Evidence of Learning    Home Exercise Program, taught by Sarah Garza PT at 12/4/2024  2:40 PM.  Learner: Patient  Readiness: Acceptance  Method: Explanation  Response: No Evidence of Learning    Mobility Training, taught by Sarah Garza PT at 12/4/2024  2:40 PM.  Learner: Patient  Readiness: Acceptance  Method: Explanation  Response: No Evidence of Learning    Education Comments  No comments found.

## 2024-12-04 NOTE — CARE PLAN
Problem: Fall/Injury  Goal: Use assistive devices by end of the shift  Outcome: Not Progressing     Problem: Skin  Goal: Participates in plan/prevention/treatment measures  Outcome: Not Progressing  Goal: Promote/optimize nutrition  Outcome: Not Progressing  Goal: Prevent/manage excess moisture  Outcome: Not Progressing  Goal: Prevent/minimize sheer/friction injuries  Outcome: Not Progressing     Problem: Nutrition  Goal: Oral intake greater than 50%  Outcome: Not Progressing  Goal: Consume prescribed supplement  Outcome: Not Progressing  Goal: Adequate PO fluid intake  Outcome: Not Progressing  Goal: Nutrition support is meeting 75% of nutrient needs  Outcome: Not Progressing     Problem: Respiratory  Goal: Increase self care and/or family involvement in next 24 hours  Outcome: Not Progressing     Problem: Pain - Adult  Goal: Verbalizes/displays adequate comfort level or baseline comfort level  Outcome: Met     Problem: Discharge Planning  Goal: Discharge to home or other facility with appropriate resources  Outcome: Progressing     Problem: Chronic Conditions and Co-morbidities  Goal: Patient's chronic conditions and co-morbidity symptoms are monitored and maintained or improved  Outcome: Met     Problem: Fall/Injury  Goal: Verbalize understanding of personal risk factors for fall in the hospital  Outcome: Progressing  Goal: Verbalize understanding of risk factor reduction measures to prevent injury from fall in the home  Outcome: Progressing  Goal: Use assistive devices by end of the shift  Outcome: Not Progressing  Goal: Pace activities to prevent fatigue by end of the shift  Outcome: Progressing     The clinical goals for the shift include Pt will be free from falls throughout shift. Goal met. Patient is stable with no acute changes at this time.

## 2024-12-04 NOTE — CONSULTS
"Reason for consult:  Capacity assessment    History Of Present Illness  Lorne Avina is a 84 y.o. male presenting with altered mental status.  Patient lives independently and most information was provided by the daughter.  Patient is alert and disoriented  Patient is unable to understand the nature and purpose of the current treatment that was proposed  Unable to analyze the risk-benefit alternative  Unable to retain information  Unable to make decision based on the available information  No past diagnosis of dementia as per her daughter although patient started seeing a neurologist recently    Past Medical History  He has a past medical history of AAA (abdominal aortic aneurysm) (CMS-HCC), BPH (benign prostatic hyperplasia), Cognitive impairment, COPD (chronic obstructive pulmonary disease) (Multi), ETOH abuse, Falls, GERD (gastroesophageal reflux disease), HLD (hyperlipidemia), Hypertension, Non-compliance, Pericardial effusion ([facility]), Pulmonary embolism, Pulmonary nodules, Stroke (Multi), and SVT (supraventricular tachycardia) (CMS-HCC).    Surgical History  He has a past surgical history that includes Cervical spine surgery and Carotid endarterectomy.     Social History  He reports that he has quit smoking. His smoking use included cigarettes. He has never used smokeless tobacco. He reports current alcohol use. He reports that he does not use drugs.     Allergies  Patient has no known allergies.    Review of Systems    Psychiatric ROS - Adult  Unable to obtain details  Physical Exam      Mental Status Exam  Unable to obtain mental status exam    Last Recorded Vitals  Blood pressure 121/65, pulse 75, temperature 36.4 °C (97.5 °F), temperature source Temporal, resp. rate 21, height 1.778 m (5' 10\"), weight 53.1 kg (117 lb 1 oz), SpO2 99%.    Relevant Results  Results for orders placed or performed during the hospital encounter of 12/01/24 (from the past 96 hours)   CBC and Auto Differential   Result Value Ref " Range    WBC 20.9 (H) 4.4 - 11.3 x10*3/uL    nRBC 0.0 0.0 - 0.0 /100 WBCs    RBC 4.91 4.50 - 5.90 x10*6/uL    Hemoglobin 14.6 13.5 - 17.5 g/dL    Hematocrit 46.3 41.0 - 52.0 %    MCV 94 80 - 100 fL    MCH 29.7 26.0 - 34.0 pg    MCHC 31.5 (L) 32.0 - 36.0 g/dL    RDW 13.9 11.5 - 14.5 %    Platelets 206 150 - 450 x10*3/uL    Neutrophils % 86.8 40.0 - 80.0 %    Immature Granulocytes %, Automated 0.9 0.0 - 0.9 %    Lymphocytes % 4.8 13.0 - 44.0 %    Monocytes % 7.4 2.0 - 10.0 %    Eosinophils % 0.0 0.0 - 6.0 %    Basophils % 0.1 0.0 - 2.0 %    Neutrophils Absolute 18.17 (H) 1.60 - 5.50 x10*3/uL    Immature Granulocytes Absolute, Automated 0.19 0.00 - 0.50 x10*3/uL    Lymphocytes Absolute 1.01 0.80 - 3.00 x10*3/uL    Monocytes Absolute 1.54 (H) 0.05 - 0.80 x10*3/uL    Eosinophils Absolute 0.00 0.00 - 0.40 x10*3/uL    Basophils Absolute 0.02 0.00 - 0.10 x10*3/uL   Comprehensive Metabolic Panel   Result Value Ref Range    Glucose 118 (H) 74 - 99 mg/dL    Sodium 147 (H) 136 - 145 mmol/L    Potassium 4.6 3.5 - 5.3 mmol/L    Chloride 105 98 - 107 mmol/L    Bicarbonate 33 (H) 21 - 32 mmol/L    Anion Gap 14 10 - 20 mmol/L    Urea Nitrogen 56 (H) 6 - 23 mg/dL    Creatinine 0.97 0.50 - 1.30 mg/dL    eGFR 78 >60 mL/min/1.73m*2    Calcium 9.5 8.6 - 10.3 mg/dL    Albumin 3.9 3.4 - 5.0 g/dL    Alkaline Phosphatase 75 33 - 136 U/L    Total Protein 7.0 6.4 - 8.2 g/dL     (H) 9 - 39 U/L    Bilirubin, Total 1.3 (H) 0.0 - 1.2 mg/dL    ALT 43 10 - 52 U/L   Lactate   Result Value Ref Range    Lactate 1.8 0.4 - 2.0 mmol/L   Blood Culture    Specimen: Peripheral Venipuncture; Blood culture   Result Value Ref Range    Blood Culture No growth at 2 days    Blood Culture    Specimen: Peripheral Venipuncture; Blood culture   Result Value Ref Range    Blood Culture No growth at 2 days    Troponin I, High Sensitivity   Result Value Ref Range    Troponin I, High Sensitivity 113 (HH) 0 - 20 ng/L   Creatine Kinase   Result Value Ref Range     Creatine Kinase 2,758 (H) 0 - 325 U/L   Alcohol   Result Value Ref Range    Alcohol <10 <=10 mg/dL   Protime-INR   Result Value Ref Range    Protime 10.7 9.8 - 12.8 seconds    INR 1.0 0.9 - 1.1   Type And Screen   Result Value Ref Range    ABO TYPE O     Rh TYPE POS     ANTIBODY SCREEN NEG    D-Dimer, Quantitative Non VTE   Result Value Ref Range    D-Dimer Non VTE, Quant (ng/mL FEU) 15,893 (H) <=500 ng/mL FEU   Red Top   Result Value Ref Range    Extra Tube Hold for add-ons.    Green Top   Result Value Ref Range    Extra Tube Hold for add-ons.    Lavender Top   Result Value Ref Range    Extra Tube Hold for add-ons.    SST TOP   Result Value Ref Range    Extra Tube Hold for add-ons.    B-type Natriuretic Peptide   Result Value Ref Range    BNP 1,067 (H) 0 - 99 pg/mL   Electrocardiogram, 12-lead ACS symptoms   Result Value Ref Range    Ventricular Rate 120 BPM    Atrial Rate 120 BPM    MS Interval 148 ms    QRS Duration 140 ms    QT Interval 340 ms    QTC Calculation(Bazett) 480 ms    P Axis 83 degrees    R Axis -87 degrees    T Axis 79 degrees    QRS Count 19 beats    Q Onset 215 ms    P Onset 141 ms    P Offset 191 ms    T Offset 385 ms    QTC Fredericia 428 ms   Troponin I, High Sensitivity   Result Value Ref Range    Troponin I, High Sensitivity 137 (HH) 0 - 20 ng/L   Urinalysis with Reflex Culture and Microscopic   Result Value Ref Range    Color, Urine Yellow Light-Yellow, Yellow, Dark-Yellow    Appearance, Urine Turbid (N) Clear    Specific Gravity, Urine >1.050 (N) 1.005 - 1.035    pH, Urine 6.0 5.0, 5.5, 6.0, 6.5, 7.0, 7.5, 8.0    Protein, Urine 50 (1+) (A) NEGATIVE, 10 (TRACE), 20 (TRACE) mg/dL    Glucose, Urine Normal Normal mg/dL    Blood, Urine 0.2 (2+) (A) NEGATIVE    Ketones, Urine 20 (1+) (A) NEGATIVE mg/dL    Bilirubin, Urine NEGATIVE NEGATIVE    Urobilinogen, Urine Normal Normal mg/dL    Nitrite, Urine NEGATIVE NEGATIVE    Leukocyte Esterase, Urine NEGATIVE NEGATIVE   Extra Urine Gray Tube   Result  Value Ref Range    Extra Tube Hold for add-ons.    Urinalysis Microscopic   Result Value Ref Range    WBC, Urine 1-5 1-5, NONE /HPF    RBC, Urine 11-20 (A) NONE, 1-2, 3-5 /HPF    Squamous Epithelial Cells, Urine 1-9 (SPARSE) Reference range not established. /HPF    Mucus, Urine 4+ Reference range not established. /LPF    Hyaline Casts, Urine 1+ (A) NONE /LPF   Troponin I, High Sensitivity   Result Value Ref Range    Troponin I, High Sensitivity 127 (HH) 0 - 20 ng/L   Creatine Kinase   Result Value Ref Range    Creatine Kinase 2,745 (H) 0 - 325 U/L   Heparin Assay, UFH   Result Value Ref Range    Heparin Unfractionated 0.5 See Comment Below for Therapeutic Ranges IU/mL   Heparin Assay, UFH   Result Value Ref Range    Heparin Unfractionated 0.3 See Comment Below for Therapeutic Ranges IU/mL   CBC   Result Value Ref Range    WBC 18.5 (H) 4.4 - 11.3 x10*3/uL    nRBC 0.0 0.0 - 0.0 /100 WBCs    RBC 4.57 4.50 - 5.90 x10*6/uL    Hemoglobin 13.7 13.5 - 17.5 g/dL    Hematocrit 45.4 41.0 - 52.0 %    MCV 99 80 - 100 fL    MCH 30.0 26.0 - 34.0 pg    MCHC 30.2 (L) 32.0 - 36.0 g/dL    RDW 14.0 11.5 - 14.5 %    Platelets 155 150 - 450 x10*3/uL   Basic metabolic panel   Result Value Ref Range    Glucose 83 74 - 99 mg/dL    Sodium 146 (H) 136 - 145 mmol/L    Potassium 4.1 3.5 - 5.3 mmol/L    Chloride 106 98 - 107 mmol/L    Bicarbonate 28 21 - 32 mmol/L    Anion Gap 16 10 - 20 mmol/L    Urea Nitrogen 51 (H) 6 - 23 mg/dL    Creatinine 0.76 0.50 - 1.30 mg/dL    eGFR 90 >60 mL/min/1.73m*2    Calcium 8.7 8.6 - 10.3 mg/dL   Procalcitonin   Result Value Ref Range    Procalcitonin 0.17 (H) <=0.07 ng/mL   Creatine Kinase   Result Value Ref Range    Creatine Kinase 1,533 (H) 0 - 325 U/L   Heparin Assay, UFH   Result Value Ref Range    Heparin Unfractionated 0.3 See Comment Below for Therapeutic Ranges IU/mL   Ammonia   Result Value Ref Range    Ammonia 43 16 - 53 umol/L   Troponin I, High Sensitivity   Result Value Ref Range    Troponin I,  High Sensitivity 84 (HH) 0 - 20 ng/L   B-Type Natriuretic Peptide   Result Value Ref Range     (H) 0 - 99 pg/mL   Sars-CoV-2 PCR   Result Value Ref Range    Coronavirus 2019, PCR Not Detected Not Detected   Influenza A, and B PCR   Result Value Ref Range    Flu A Result Not Detected Not Detected    Flu B Result Not Detected Not Detected   Lactate   Result Value Ref Range    Lactate 2.1 (H) 0.4 - 2.0 mmol/L   Heparin Assay, UFH   Result Value Ref Range    Heparin Unfractionated <0.1 See Comment Below for Therapeutic Ranges IU/mL   Transthoracic Echo (TTE) Complete   Result Value Ref Range    LV EF 70 %    RVSP 31.8 mmHg    LV A4C EF 59.1    BLOOD GAS ARTERIAL FULL PANEL   Result Value Ref Range    POCT pH, Arterial 7.46 (H) 7.38 - 7.42 pH    POCT pCO2, Arterial 45 (H) 38 - 42 mm Hg    POCT pO2, Arterial 245 (H) 85 - 95 mm Hg    POCT SO2, Arterial 100 94 - 100 %    POCT Oxy Hemoglobin, Arterial 97.2 94.0 - 98.0 %    POCT Hematocrit Calculated, Arterial 41.0 41.0 - 52.0 %    POCT Sodium, Arterial 142 136 - 145 mmol/L    POCT Potassium, Arterial 3.8 3.5 - 5.3 mmol/L    POCT Chloride, Arterial 109 (H) 98 - 107 mmol/L    POCT Ionized Calcium, Arterial 1.25 1.10 - 1.33 mmol/L    POCT Glucose, Arterial 108 (H) 74 - 99 mg/dL    POCT Lactate, Arterial 1.4 0.4 - 2.0 mmol/L    POCT Base Excess, Arterial 7.1 (H) -2.0 - 3.0 mmol/L    POCT HCO3 Calculated, Arterial 32.0 (H) 22.0 - 26.0 mmol/L    POCT Hemoglobin, Arterial 13.6 13.5 - 17.5 g/dL    POCT Anion Gap, Arterial 5 (L) 10 - 25 mmo/L    Patient Temperature      FiO2 33 %    Apparatus CANNULA    Heparin Assay   Result Value Ref Range    Heparin Unfractionated 0.2 See Comment Below for Therapeutic Ranges IU/mL   Lactate   Result Value Ref Range    Lactate 4.8 (HH) 0.4 - 2.0 mmol/L   Lactate   Result Value Ref Range    Lactate 2.6 (H) 0.4 - 2.0 mmol/L   Comprehensive metabolic panel   Result Value Ref Range    Glucose 86 74 - 99 mg/dL    Sodium 146 (H) 136 - 145 mmol/L     Potassium 3.8 3.5 - 5.3 mmol/L    Chloride 107 98 - 107 mmol/L    Bicarbonate 29 21 - 32 mmol/L    Anion Gap 14 10 - 20 mmol/L    Urea Nitrogen 53 (H) 6 - 23 mg/dL    Creatinine 0.74 0.50 - 1.30 mg/dL    eGFR 90 >60 mL/min/1.73m*2    Calcium 8.3 (L) 8.6 - 10.3 mg/dL    Albumin 2.8 (L) 3.4 - 5.0 g/dL    Alkaline Phosphatase 48 33 - 136 U/L    Total Protein 5.1 (L) 6.4 - 8.2 g/dL    AST 62 (H) 9 - 39 U/L    Bilirubin, Total 1.1 0.0 - 1.2 mg/dL    ALT 32 10 - 52 U/L   Magnesium   Result Value Ref Range    Magnesium 2.21 1.60 - 2.40 mg/dL   Heparin Assay, UFH   Result Value Ref Range    Heparin Unfractionated 0.4 See Comment Below for Therapeutic Ranges IU/mL   Magnesium   Result Value Ref Range    Magnesium 2.24 1.60 - 2.40 mg/dL   Phosphorus   Result Value Ref Range    Phosphorus 2.9 2.5 - 4.9 mg/dL   Comprehensive Metabolic Panel   Result Value Ref Range    Glucose 92 74 - 99 mg/dL    Sodium 147 (H) 136 - 145 mmol/L    Potassium 3.7 3.5 - 5.3 mmol/L    Chloride 109 (H) 98 - 107 mmol/L    Bicarbonate 29 21 - 32 mmol/L    Anion Gap 13 10 - 20 mmol/L    Urea Nitrogen 51 (H) 6 - 23 mg/dL    Creatinine 0.71 0.50 - 1.30 mg/dL    eGFR >90 >60 mL/min/1.73m*2    Calcium 8.3 (L) 8.6 - 10.3 mg/dL    Albumin 2.7 (L) 3.4 - 5.0 g/dL    Alkaline Phosphatase 46 33 - 136 U/L    Total Protein 5.0 (L) 6.4 - 8.2 g/dL    AST 60 (H) 9 - 39 U/L    Bilirubin, Total 1.1 0.0 - 1.2 mg/dL    ALT 30 10 - 52 U/L   CBC   Result Value Ref Range    WBC 9.8 4.4 - 11.3 x10*3/uL    nRBC 0.0 0.0 - 0.0 /100 WBCs    RBC 3.78 (L) 4.50 - 5.90 x10*6/uL    Hemoglobin 11.4 (L) 13.5 - 17.5 g/dL    Hematocrit 36.8 (L) 41.0 - 52.0 %    MCV 97 80 - 100 fL    MCH 30.2 26.0 - 34.0 pg    MCHC 31.0 (L) 32.0 - 36.0 g/dL    RDW 13.9 11.5 - 14.5 %    Platelets 140 (L) 150 - 450 x10*3/uL   Heparin Assay   Result Value Ref Range    Heparin Unfractionated 0.3 See Comment Below for Therapeutic Ranges IU/mL   Creatine Kinase   Result Value Ref Range    Creatine Kinase 785  (H) 0 - 325 U/L   Lactate   Result Value Ref Range    Lactate 2.8 (H) 0.4 - 2.0 mmol/L   PST Top   Result Value Ref Range    Extra Tube Hold for add-ons.    Lactate   Result Value Ref Range    Lactate 3.2 (H) 0.4 - 2.0 mmol/L   Urinalysis with Reflex Culture and Microscopic   Result Value Ref Range    Color, Urine Yellow Light-Yellow, Yellow, Dark-Yellow    Appearance, Urine Ex.Turbid (N) Clear    Specific Gravity, Urine 1.040 (N) 1.005 - 1.035    pH, Urine 6.0 5.0, 5.5, 6.0, 6.5, 7.0, 7.5, 8.0    Protein, Urine 20 (TRACE) NEGATIVE, 10 (TRACE), 20 (TRACE) mg/dL    Glucose, Urine Normal Normal mg/dL    Blood, Urine NEGATIVE NEGATIVE    Ketones, Urine 10 (1+) (A) NEGATIVE mg/dL    Bilirubin, Urine NEGATIVE NEGATIVE    Urobilinogen, Urine Normal Normal mg/dL    Nitrite, Urine NEGATIVE NEGATIVE    Leukocyte Esterase, Urine NEGATIVE NEGATIVE   Extra Urine Gray Tube   Result Value Ref Range    Extra Tube Hold for add-ons.    Urinalysis Microscopic   Result Value Ref Range    WBC, Urine 6-10 (A) 1-5, NONE /HPF    RBC, Urine 6-10 (A) NONE, 1-2, 3-5 /HPF    Mucus, Urine FEW Reference range not established. /LPF    Amorphous Crystals, Urine 1+ NONE, 1+, 2+ /HPF   Protein, Urine Random   Result Value Ref Range    Total Protein, Urine Random 31 (H) 5 - 25 mg/dL    Creatinine, Urine Random 113.9 20.0 - 370.0 mg/dL    T. Protein/Creatinine Ratio 0.27 (H) 0.00 - 0.17 mg/mg Creat   Lactate   Result Value Ref Range    Lactate 1.8 0.4 - 2.0 mmol/L   Basic Metabolic Panel   Result Value Ref Range    Glucose 98 74 - 99 mg/dL    Sodium 146 (H) 136 - 145 mmol/L    Potassium 3.4 (L) 3.5 - 5.3 mmol/L    Chloride 109 (H) 98 - 107 mmol/L    Bicarbonate 32 21 - 32 mmol/L    Anion Gap 8 (L) 10 - 20 mmol/L    Urea Nitrogen 39 (H) 6 - 23 mg/dL    Creatinine 0.65 0.50 - 1.30 mg/dL    eGFR >90 >60 mL/min/1.73m*2    Calcium 8.0 (L) 8.6 - 10.3 mg/dL   Basic Metabolic Panel   Result Value Ref Range    Glucose 90 74 - 99 mg/dL    Sodium 148 (H) 136 -  145 mmol/L    Potassium 3.8 3.5 - 5.3 mmol/L    Chloride 110 (H) 98 - 107 mmol/L    Bicarbonate 33 (H) 21 - 32 mmol/L    Anion Gap 9 (L) 10 - 20 mmol/L    Urea Nitrogen 35 (H) 6 - 23 mg/dL    Creatinine 0.59 0.50 - 1.30 mg/dL    eGFR >90 >60 mL/min/1.73m*2    Calcium 8.2 (L) 8.6 - 10.3 mg/dL   Ferritin   Result Value Ref Range    Ferritin 699 (H) 20 - 300 ng/mL   Magnesium   Result Value Ref Range    Magnesium 1.96 1.60 - 2.40 mg/dL   Comprehensive Metabolic Panel   Result Value Ref Range    Glucose 96 74 - 99 mg/dL    Sodium 148 (H) 136 - 145 mmol/L    Potassium 3.6 3.5 - 5.3 mmol/L    Chloride 111 (H) 98 - 107 mmol/L    Bicarbonate 31 21 - 32 mmol/L    Anion Gap 10 10 - 20 mmol/L    Urea Nitrogen 33 (H) 6 - 23 mg/dL    Creatinine 0.56 0.50 - 1.30 mg/dL    eGFR >90 >60 mL/min/1.73m*2    Calcium 8.2 (L) 8.6 - 10.3 mg/dL    Albumin 2.6 (L) 3.4 - 5.0 g/dL    Alkaline Phosphatase 44 33 - 136 U/L    Total Protein 4.8 (L) 6.4 - 8.2 g/dL    AST 45 (H) 9 - 39 U/L    Bilirubin, Total 1.0 0.0 - 1.2 mg/dL    ALT 28 10 - 52 U/L   CBC   Result Value Ref Range    WBC 6.8 4.4 - 11.3 x10*3/uL    nRBC 0.0 0.0 - 0.0 /100 WBCs    RBC 3.48 (L) 4.50 - 5.90 x10*6/uL    Hemoglobin 10.5 (L) 13.5 - 17.5 g/dL    Hematocrit 34.0 (L) 41.0 - 52.0 %    MCV 98 80 - 100 fL    MCH 30.2 26.0 - 34.0 pg    MCHC 30.9 (L) 32.0 - 36.0 g/dL    RDW 13.7 11.5 - 14.5 %    Platelets 119 (L) 150 - 450 x10*3/uL   Heparin Assay   Result Value Ref Range    Heparin Unfractionated 0.3 See Comment Below for Therapeutic Ranges IU/mL   Creatine Kinase   Result Value Ref Range    Creatine Kinase 466 (H) 0 - 325 U/L   Iron and TIBC   Result Value Ref Range    Iron 29 (L) 35 - 150 ug/dL    UIBC 111 110 - 370 ug/dL    TIBC 140 (L) 240 - 445 ug/dL    % Saturation 21 (L) 25 - 45 %     FL modified barium swallow study    Result Date: 12/3/2024  Interpreted By:  Merlin Farias and Hetrick Bethany STUDY: FL MODIFIED BARIUM SWALLOW STUDY;; 12/3/2024 11:54 am   INDICATION:  Signs/Symptoms:Throat clearing/coughing after thin/nectar thick liquids; throat clearing following honey thick liquids (single boluses via cup and teaspoon); throat clearing after 1 out of 3 half-teaspoon boluses honey thick liquids..     COMPARISON: None.   ACCESSION NUMBER(S): [id number]   ORDERING CLINICIAN: CHARLIE JUNIOR   TECHNIQUE: Modified Barium Swallow Study completed. Informed verbal consent obtained prior to completion of exam. The study was completed per modified protocol with various liquid barium consistencies and pudding. A 1.9 cm or .75 inch (outer diameter) ring was placed on the chin in the lateral view  in order to complete objective measurements during swallowing. The anatomic structures and function of the oropharynx, larynx, hypopharynx and cervical esophagus were evaluated.   SLP: KIERSTEN DAVALOS, KEVIN_SLP; EFFIE VARGAS-SLP Contact info: [employer] chat; phone: [phone]   SPEECH FINDINGS: Reason for Referral: Suspected oral or pharyngeal dysphagia Patient Hx: Patient is an 84-year-old man with a PMHx for HTN, HLD, infrarenal AAA, Hx of SVT s/p ablation, CVA with residual left-sided weakness, chronic hypoxic respiratory failure 2/2 COPD on 2 L NC prn, and mild cognitive impairment who presents to [facility] following a fall.   Upon presentation today, patient does not report being in pain, however patient had difficulty following directions with evident confusion throughout study..   Respiratory Status: 3 L O2 via nasal cannula Current diet: Puree solids and moderately/honey thick liquids per clinical swallow evaluation 24 Pain: Pain Scale: 0-10 Ratin   FINAL SPEECH RECOMMENDATIONS   DIET: NPO; may have 2-3 small, single ice chips AFTER ORAL CARE   STRATEGIES: - Complete oral care frequently throughout the day   EXERCISES: Effortful Swallow with ice chips; additional exercises may be considered pending improvement in cognitive status and ability to follow commands   SLP PLAN:  Skilled SLP Services: Skilled SLP intervention for dysphagia is warranted. SLP Frequency: 3x per week Duration: 2 weeks Treatment/Interventions: - Oropharyngeal exercises - Patient/caregiver education   Discussed POC: Patient Discussed Risks/Benefits: Yes Patient/Caregiver Agreeable: Yes   Short term goals established 12/03/24: Patient/Family will verbalize/demonstrate comprehension of dysphagia education, strategies, recommendations and POC. 2.   Patient will complete recommended swallowing exercises (effortful swallow ) for at least 30 repetitions during treatment session given minimal-moderate cues. 3.   Patient will tolerate PO trials of ice chips with consistent swallow elicited and without overt s./s of aspiration in 90% of trials.       Additional Medical Consults Suggested: Palliative care to consider goals of care, if no improvement in cognition/swallowing status is observed   Repeat Study: Pending improved cognitive status     Mechanics of the Swallow Summary: ORAL PHASE: Lip Closure - Profuse escape through open lips Tongue Control During Bolus Hold - Unable to assess due to inability to follow directions Bolus prep/mastication - Mastication not assessed Bolus transport/lingual motion - Repetitive/disorganized tongue motion (tongue pumping) Oral residue - Majority of bolus remaining   PHARYNGEAL PHASE: Initiation of pharyngeal swallow - Collection of bolus at the level of the pyriform sinus Soft palate elevation - Escape to nasopharynx Laryngeal elevation - Partial superior movement of thyroid cartilage and/or partial approximation of arytenoids to epiglottic petiole inconsistent Anterior hyoid excursion - Partial anterior movement inconsistent Epiglottic movement - Partial inversion inconsistent Laryngeal vestibule closure - Incomplete - narrow column of air/contrast in laryngeal vestibule Pharyngeal stripping wave - Absent Pharyngeal contraction (A/P view) - Not tested Pharyngoesophageal segment opening  - Minimal distension/incomplete duration with marked obstruction of flow of bolus Tongue base retraction - Wide column of contrast or air between tongue base and pharyngeal wall Pharyngeal residue - Majority of contrast within or on the pharyngeal structures   ESOPHAGEAL PHASE: Esophageal clearance - Not evaluated     SLP Impressions with Severity Rating: Pt presents with severe oropharyngeal dysphagia upon completion of modified barium swallow study this date. Swallowing physiology is detailed above. Impairments most impacting swallowing safety and efficiency include tongue pumping, delayed initiation of swallow, decreased soft palate elevation, decreased tongue base retraction, reduced laryngeal elevation, reduced anterior hyoid excursion, partial epiglottic movement, incomplete laryngeal vestibular closure, absent pharyngeal stripping wave, and minimal distension during PES opening. Patient's laryngeal elevation and anterior hyoid excursion were inconsistent as the elevation and excursion only occurred completely with moderately thick liquids and pureed solids. This also occurred with the patient's epiglottic inversion as it was inconsistent due to only inverting with moderately thick liquids and puree solids. Patient demonstrated trace penetration above the vocal folds during most consistencies trialed (mildly thick liquid, moderately thick liquid, and puree solids). Penetration reached the vocal cords with visible residue after the mildly thick, moderately thick and puree solid trials.  Although no aspiration was visualized during study, patient demonstrated moderate -severe oral and pharyngeal residue that was minimally reduced with cued repeat swallows, however patient was inconsistently responsive to these cues. Oral suctioning was required following pudding consistency due to patient's inability to clear bolus from oral cavity.   Per the results of this assessment, patient is at high risk for aspiration due  to severity of oropharyngeal deficits. Recommend NPO with strict oral care, and small quantities of ice chips after oral care for comfort. Also recommend palliative care consult as it may be appropriate to determine goals of care regarding PO intake. ST to continue to follow during inpatient stay to provide ongoing education, and to direct swallowing exercises targeting deficits identified. Repeat MBSS may be considered if patient demonstrates improvement at bedside during inpatient stay.     OUTCOME MEASURES: Functional Oral Intake Scale Functional Oral Intake Scale: Level 1        nothing by mouth   Rosenbek's Penetration Aspiration Scale Thin Liquids: only one partial swallow visualized with a minimal amount of bolus Nectar Thick Liquids: 5. DEEP PENETRATION with HIGH ASPIRATION risk - contrast contacts vocal cords, visible residue During the Swallow Honey Thick Liquids: 5. DEEP PENETRATION with HIGH ASPIRATION risk - contrast contacts vocal cords, visible residue During the Swallow After the Swallow Puree: 5. DEEP PENETRATION with HIGH ASPIRATION risk - contrast contacts vocal cords, visible residue During the Swallow After the Swallow     Speech Therapy section of this report signed by Ysabel Hitchcock MS, CCC-SLP on 12/3/2024 at 1:37 pm.   RADIOLOGY FINDINGS: Anterior cervical fusion hardware extends from C4-C7. Trace anterolisthesis C2-3.   Radiology section of this report signed by Merlin Farias.       Deep laryngeal penetration with multiple consistencies.   MACRO: None   Signed by: Merlin Farias 12/3/2024 1:46 PM Dictation workstation:   [id number]    Transthoracic Echo (TTE) Complete    Result Date: 12/2/2024            [facility] [address] [facility], [facility] [address]    Tel [phone] Fax [phone] TRANSTHORACIC ECHOCARDIOGRAM REPORT Patient Name:       ADDY GAUTHIER          Reading Physician:    [id number] Andrzej Mae MD Study  Date:         12/2/2024            Ordering Provider:    [id number] MEAHGAN JOEL MRN/PID:            [id number]             Fellow: Accession#:         [id number]         Nurse: Date of Birth/Age:  1940 / 84 years Sonographer:          Coreen Pathak RDCS Gender Assigned at  M                    Additional Staff: Birth: Height:             177.80 cm            Admit Date:           12/2/2024 Weight:             50.80 kg             Admission Status:     Inpatient -                                                                Priority                                                                discharge BSA / BMI:          1.63 m2 / 16.07      Department Location:  [facility] ICU Back                     kg/m2                                      (27-34) Blood Pressure: 131 /73 mmHg Study Type:    TRANSTHORACIC ECHO (TTE) COMPLETE Diagnosis/ICD: Other pulmonary embolism without acute cor pulmonale-I26.99 Indication:    PE CPT Codes:     Echo Complete w Full Doppler-71420; Echo Limited-61347  Study Detail: The following Echo studies were performed: 2D, M-Mode, Doppler and               color flow. Technically challenging study due to poor acoustic               windows and body habitus. Agitated saline used as a contrast agent               for intraseptal flow evaluation.  PHYSICIAN INTERPRETATION: Left Ventricle: Left ventricular ejection fraction is normal, by visual estimate at 70%. There are no regional wall motion abnormalities. The left ventricular cavity size is decreased. The interventricular septum is flattened in systole and diastole, consistent with right ventricular pressure and volume overload. Left ventricular diastolic filling was indeterminate. Left Atrium: The left atrium is mildly dilated. A bubble study using agitated saline was performed. Bubble study is negative. Right  Ventricle: The right ventricle is moderately enlarged. There is mildly reduced right ventricular systolic function. Right Atrium: The right atrium is moderately dilated. Aortic Valve: The aortic valve is trileaflet. There is moderate aortic valve cusp calcification. There is evidence of mildly elevated transaortic gradients consistent with sclerosis of the aortic valve. There is no evidence of aortic valve regurgitation. Mitral Valve: The mitral valve is mildly thickened. There is trace mitral valve regurgitation. Tricuspid Valve: The tricuspid valve is structurally normal. There is mild tricuspid regurgitation. The Doppler estimated RVSP is within normal limits at 31.8 mmHg. Pulmonic Valve: The pulmonic valve is not well visualized. There is no indication of pulmonic valve regurgitation. Pericardium: Small pericardial effusion. There is no evidence of cardiac tamponade. Aorta: The aortic root is normal. Systemic Veins: The inferior vena cava appears normal in size.  CONCLUSIONS:  1. Left ventricular ejection fraction is normal, by visual estimate at 70%.  2. Left ventricular diastolic filling was indeterminate.  3. Left ventricular cavity size is decreased.  4. Right ventricular volume and pressure overload.  5. There is mildly reduced right ventricular systolic function.  6. Moderately enlarged right ventricle.  7. The right atrium is moderately dilated.  8. There is no evidence of cardiac tamponade.  9. Right ventricular systolic pressure is within normal limits. 10. Aortic valve sclerosis. 11. There is moderate aortic valve cusp calcification. QUANTITATIVE DATA SUMMARY:  LV SYSTOLIC FUNCTION BY 2D PLANIMETRY (MOD):                      Normal Ranges: EF-A4C View:    59 % (>=55%) EF-Visual:      70 % LV EF Reported: 70 %  TRICUSPID VALVE/RVSP:          Normal Ranges: Peak TR Velocity:     2.69 m/s RV Syst Pressure:     32 mmHg  (< 30mmHg)  [id number] Andrzej Mae MD Electronically signed on 12/2/2024 at 5:09:09 PM  **  Final **     Electrocardiogram, 12-lead ACS symptoms    Result Date: 12/2/2024  Sinus tachycardia with Fusion complexes Left axis deviation Right bundle branch block T wave abnormality, consider lateral ischemia Abnormal ECG No previous ECGs available    CT head wo IV contrast    Result Date: 12/2/2024  Interpreted By:  Merlin Farias, STUDY: CT HEAD WO IV CONTRAST  12/2/2024 11:09 am   INDICATION: Signs/Symptoms:AMS. on heaprin drip   COMPARISON: 12/01/2004   ACCESSION NUMBER(S): [id number]   ORDERING CLINICIAN: MEAGHAN JOEL   TECHNIQUE: Contiguous axial CT images of the brain were obtained without IV contrast.   FINDINGS: The ventricles, cisterns and sulci are prominent, consistent with severe diffuse volume loss. Areas of white matter low attenuation are nonspecific but likely related to chronic microvascular disease. No change in areas of encephalomalacia. There is intracranial atherosclerosis.   Gray-white differentiation is preserved. No acute intracranial hemorrhage or mass effect. No midline shift. Patent basal cisterns. No extraaxial fluid collections.   The calvaria is intact. The visualized paranasal sinuses and mastoid air cells are clear.       No acute intracranial pathology.     Signed by: Merlin Farias 12/2/2024 11:54 AM Dictation workstation:   [id number]    CT angio chest for pulmonary embolism    Result Date: 12/1/2024  STUDY: CT Angiogram of the Chest; 12/01/2024 4:00 pm INDICATION: Elevated D-Dimer, down for 4 days. COMPARISON: CT CAP 12/01/2024. ACCESSION NUMBER(S): [id number] ORDERING CLINICIAN: LINDA WINTERS TECHNIQUE:  CTA of the chest was performed with intravenous contrast. Images are reviewed and processed at a workstation according to the CT angiogram protocol with 3-D and/or MIP post processing imaging generated.  Automated mA/kV exposure control was utilized and patient examination was performed in strict accordance with principles of ALARA. FINDINGS: Pulmonary arteries are  adequately opacified and there is a single pulmonary embolism involving the right interlobar pulmonary artery extending into the anterobasilar segment.  The thoracic aorta is normal in course and caliber without dissection or aneurysm. Small pericardial effusion.  Severe coronary artery calcifications noted.  Thoracic lymph nodes are not enlarged. There is no pleural effusion, pleural thickening, or pneumothorax. The airways are patent. Emphysematous changes again noted.  Again noted are approximately 3 nodular lesions involving the left upper lobe, left lower lobe and right lower lobe unchanged from exam from the same day measuring up to approximately 2 cm involving the left upper lobe and left lower lobe Upper abdomen demonstrates no acute pathology. There are no acute fractures.  No suspicious bony lesions.    1. Single pulmonary embolism involving the right interlobar pulmonary artery extending into the anterobasilar segment.  Minimal embolic burden.  No evidence of right heart strain or pulmonary infarct. 2. Again noted are approximately 3 nodular lesions involving the left upper lobe, left lower lobe and right lower lobe unchanged from exam from the same day measuring up to approximately 2 cm involving the left upper lobe and left lower lobe.  Findings concerning for possible neoplasm.  Recommend further evaluation with PET/CT. 3. Emphysema. 4. Small pericardial effusion. 5. Severe coronary artery calcifications. Findings discussed with and acknowledged by Jelly Arana 4:28 PM Signed by Bong Nixon MD    XR tibia fibula bilateral 2 views    Result Date: 12/1/2024  STUDY: Bilateral Tibia and Fibula Radiographs; 12/1/2024 2:00 PM INDICATION: Fall. COMPARISON: None Available. ACCESSION NUMBER(S): [id number] ORDERING CLINICIAN: ROSANA BRUCE TECHNIQUE:  Two view(s) of the right tibia/fibula (four images) and two view(s) of the left tibia/fibula (four images). FINDINGS:  RIGHT TIBIA AND FIBULA:   There is no displaced fracture.  The alignment is anatomic.  No soft tissue abnormality is seen. LEFT TIBIA AND FIBULA:  There is no displaced fracture.  The alignment is anatomic.  No soft tissue abnormality is seen.    No acute osseous abnormality. Signed by Lorne Victor MD    XR femur 2 VW bilateral    Result Date: 12/1/2024  STUDY: Bilateral Femur Radiographs; 12/1/2024 2:00 PM INDICATION: Fall. COMPARISON: None Available. ACCESSION NUMBER(S): [id number] ORDERING CLINICIAN: ROSANA BRUCE TECHNIQUE:  Two view(s) of the right femur (four images) and two view(s) of the left femur (four images). FINDINGS:  Mild generalized osseous demineralization is noted. Right:  There is no displaced fracture.  The alignment is anatomic. No soft tissue abnormality is seen. Left:  There is no displaced fracture.  The alignment is anatomic.  No soft tissue abnormality is seen. Excreted contrast is noted in the urinary bladder.  Peripheral vascular calcifications are seen bilaterally.    No definite acute osseous abnormality identified. Signed by Aiden Barrow    XR ribs 2 views left w chest pa or ap    Result Date: 12/1/2024  STUDY: Left Rib and Chest Radiographs INDICATION: Fall. COMPARISON: None Available. ACCESSION NUMBER(S): [id number] ORDERING CLINICIAN: ROSANA BRUCE TECHNIQUE:  Frontal chest and five view(s) of the left ribs. FINDINGS:  CARDIOMEDIASTINAL SILHOUETTE: Cardiomediastinal silhouette is normal in size and configuration. Calcified plaque is seen in the aorta.  LUNGS: Lungs are clear.  ABDOMEN: No remarkable upper abdominal findings.  Excreted contrast is seen in the collecting systems. LEFT RIBS:  There is no acute rib fracture. OTHER VISUALIZED BONES: No acute osseous changes.  Cervical fusion hardware is partially visualized.  Mild levoconvex curvature is seen of the mid thoracic spine.    No definite acute cardiopulmonary disease. No definite acute left rib fracture identified. Signed by Aiden ALFONSO  pelvis 1-2 views    Result Date: 12/1/2024  STUDY: Pelvis Radiographs; 12/1/2024 2:00 PM INDICATION: Fall. COMPARISON: None Available. ACCESSION NUMBER(S): [id number] ORDERING CLINICIAN: ROSANA BRUCE TECHNIQUE:  One view(s) of the pelvis. FINDINGS:  The pelvic ring is intact.  There is no acute fracture.  Mild degenerative changes of both hips.    No acute osseous abnormality. Signed by Bong Nixon MD    CT chest abdomen pelvis w IV contrast    Result Date: 12/1/2024  STUDY: CT Chest, Abdomen, and Pelvis with IV Contrast, CT Thoracic Spine and Lumbar Spine without IV Contrast; 12/01/2024 1:20 PM INDICATION: Fall - on blood thinners. COMPARISON: CT cervical spine 10/12/2023. ACCESSION NUMBER(S): [id number], [id number], [id number] ORDERING CLINICIAN: LINDA WINTERS TECHNIQUE: CT of the chest, abdomen, and pelvis was performed.  Contiguous axial images were obtained at 3 mm slice thickness through the chest, abdomen, and pelvis.  Coronal and sagittal reconstructions at 3 mm slice thickness were performed.  Omnipaque 350 60 mL was administered intravenously.  Please note that spinal images were generated from the original CT abdomen and pelvis imaging. FINDINGS: CHEST: MEDIASTINUM: The heart is normal in size without pericardial effusion.  Aortic valvular and coronary artery calcifications are visualized.  Central vascular structures opacify normally.  LUNGS/PLEURA: There is no pleural effusion, pleural thickening, or pneumothorax. The airways are patent. The lungs demonstrate emphysematous changes bilaterally with a predominantly centrilobular distribution in an upper lobe predilection.  There is a spiculated lesion located within the left lower lobe measuring 1.8 cm in diameter (204-156) .  An additional ill-defined nodular density is noted within the left upper lobe which measures 1.8 cm in diameter (204-160) .  There is an ill-defined groundglass nodular density located within the right lower lobe  measuring 9 mm in diameter (204-188).  The graft there is biapical pleural-parenchymal scarring noted. LYMPH NODES: There is no evidence of significant thoracic lymphadenopathy by CT size criteria.. ABDOMEN:  LIVER: No hepatomegaly.  Smooth surface contour.  Normal attenuation.  There are subcentimeter hypoattenuating lesions noted within the liver. Largest measures 9 mm in diameter (201-102).  These are too small to characterize by size criteria.  There is an additional hypodense lesion located within left hepatic lobe measuring 1.4 cm in diameter (201-94) which likely represents a cyst or hemangioma.  BILE DUCTS: No intrahepatic or extrahepatic biliary ductal dilatation.  GALLBLADDER: The gallbladder is visualized.  Radiopaque calculi are noted. STOMACH: No abnormalities identified.  PANCREAS: No masses or ductal dilatation.  SPLEEN: There are calcifications noted within the spleen and liver which may represent sequelae of previous granulomatous disease.  ADRENAL GLANDS: No thickening or nodules.  KIDNEYS AND URETERS: Kidneys are normal in size and location.  No renal or ureteral calculi.  There are bilateral renal cysts noted.  The largest within the right kidney measures proximally 4.9 cm in diameter.  No hydronephrosis or nephrolithiasis.  PELVIS:  BLADDER: No abnormalities identified.  REPRODUCTIVE ORGANS: No abnormalities identified.  BOWEL: No abnormalities identified.  VESSELS: There are severe calcific atherosclerotic changes of the abdominal aorta and iliac vessels.  There is an infrarenal abdominal aortic aneurysm noted containing mural thrombus which measures up to 4.1 cm in diameter (201-138).  The visceral vessels enhance normally.  The inferior mesenteric artery is not clearly visualized..  There are severe calcific atherosclerotic changes of the iliac vessels. Calcific atherosclerotic changes of the internal iliac arteries are noted bilaterally.  PERITONEUM/RETROPERITONEUM/LYMPH NODES: No free  fluid.  No pneumoperitoneum. There is no evidence of significant abdominal or pelvic lymphadenopathy by CT size criteria.  ABDOMINAL WALL: There is colonic calculus is noted without CT evidence of diverticulitis.  The appendix is not clearly visualized.  There is no definite evidence of bowel wall thickening or dilatation to suggest mechanical obstruction.  SOFT TISSUES: There are infiltrative changes and ill-defined fluid noted overlying the subcutaneous soft tissues of the left posterior pelvis and hip (201-179) sees.  BONES: No acute fracture or aggressive osseous lesion.  Status post anterior fusion of the lower cervical spine.  THORACIC SPINE: The alignment is anatomic.  There is no fracture or traumatic subluxation. The vertebral body heights are well maintained.  There are multilevel degenerative changes of the mid to distal thoracic spine with anterior marginal spurring noted..  No significant central canal stenosis is demonstrated.  The neural foramina are patent throughout.  The paravertebral soft tissues are within normal limits. LUMBAR SPINE: The alignment is anatomic.  There is no fracture or traumatic subluxation. The vertebral body heights are well maintained.  There is multilevel degenerative disc disease of the lumbar spine with changes most prominently noted at the L1-L2 and L2-L3 levels with associated anterior osteophytosis.  No definite spondylolisthesis..  No significant central canal stenosis is demonstrated.  There is some mild bilateral neuroforamina narrowing noted at the L5-S1 level.  Mild left narrowing with associated facet hypertrophy may be noted at the L4-L5 level..  The paravertebral soft tissues are within normal limits.    1.  No definite CT evidence of acute thoracic vascular injury. 2.  There is a solid spiculated lesion located within the left lower lobe which measures 1.8 cm in diameter.  This is suspicious for possible neoplasm.  Additional nodular densities are noted within  the left upper lobe and right lower lobe as described above.  Recommend either a PET/CT for further evaluation or possible tissue sampling. 3.  No focal pulmonary consolidation, pleural effusions or pneumothorax. 4.  No definite CT evidence of abdominal or pelvic visceral/vascular injury. 5.  No intra-abdominal/pelvic fluid collections pneumoperitoneum. 6.  No acute thoracic or lumbar vertebral body compression/fracture. 7.  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 8. other findings as stated above. Signed by Terrence Borjas MD    CT thoracic spine wo IV contrast    Result Date: 12/1/2024  STUDY: CT Chest, Abdomen, and Pelvis with IV Contrast, CT Thoracic Spine and Lumbar Spine without IV Contrast; 12/01/2024 1:20 PM INDICATION: Fall - on blood thinners. COMPARISON: CT cervical spine 10/12/2023. ACCESSION NUMBER(S): [id number], [id number], [id number] ORDERING CLINICIAN: LINDA WINTERS TECHNIQUE: CT of the chest, abdomen, and pelvis was performed.  Contiguous axial images were obtained at 3 mm slice thickness through the chest, abdomen, and pelvis.  Coronal and sagittal reconstructions at 3 mm slice thickness were performed.  Omnipaque 350 60 mL was administered intravenously.  Please note that spinal images were generated from the original CT abdomen and pelvis imaging. FINDINGS: CHEST: MEDIASTINUM: The heart is normal in size without pericardial effusion.  Aortic valvular and coronary artery calcifications are visualized.  Central vascular structures opacify normally.  LUNGS/PLEURA: There is no pleural effusion, pleural thickening, or pneumothorax. The airways are patent. The lungs demonstrate emphysematous changes bilaterally with a predominantly centrilobular distribution in an upper lobe predilection.  There is a spiculated lesion located within the left lower lobe measuring 1.8 cm in diameter (204-156) .  An additional ill-defined nodular density  is noted within the left upper lobe which measures 1.8 cm in diameter (204-160) .  There is an ill-defined groundglass nodular density located within the right lower lobe measuring 9 mm in diameter (204-188).  The graft there is biapical pleural-parenchymal scarring noted. LYMPH NODES: There is no evidence of significant thoracic lymphadenopathy by CT size criteria.. ABDOMEN:  LIVER: No hepatomegaly.  Smooth surface contour.  Normal attenuation.  There are subcentimeter hypoattenuating lesions noted within the liver. Largest measures 9 mm in diameter (201-102).  These are too small to characterize by size criteria.  There is an additional hypodense lesion located within left hepatic lobe measuring 1.4 cm in diameter (201-94) which likely represents a cyst or hemangioma.  BILE DUCTS: No intrahepatic or extrahepatic biliary ductal dilatation.  GALLBLADDER: The gallbladder is visualized.  Radiopaque calculi are noted. STOMACH: No abnormalities identified.  PANCREAS: No masses or ductal dilatation.  SPLEEN: There are calcifications noted within the spleen and liver which may represent sequelae of previous granulomatous disease.  ADRENAL GLANDS: No thickening or nodules.  KIDNEYS AND URETERS: Kidneys are normal in size and location.  No renal or ureteral calculi.  There are bilateral renal cysts noted.  The largest within the right kidney measures proximally 4.9 cm in diameter.  No hydronephrosis or nephrolithiasis.  PELVIS:  BLADDER: No abnormalities identified.  REPRODUCTIVE ORGANS: No abnormalities identified.  BOWEL: No abnormalities identified.  VESSELS: There are severe calcific atherosclerotic changes of the abdominal aorta and iliac vessels.  There is an infrarenal abdominal aortic aneurysm noted containing mural thrombus which measures up to 4.1 cm in diameter (201-138).  The visceral vessels enhance normally.  The inferior mesenteric artery is not clearly visualized..  There are severe calcific atherosclerotic  changes of the iliac vessels. Calcific atherosclerotic changes of the internal iliac arteries are noted bilaterally.  PERITONEUM/RETROPERITONEUM/LYMPH NODES: No free fluid.  No pneumoperitoneum. There is no evidence of significant abdominal or pelvic lymphadenopathy by CT size criteria.  ABDOMINAL WALL: There is colonic calculus is noted without CT evidence of diverticulitis.  The appendix is not clearly visualized.  There is no definite evidence of bowel wall thickening or dilatation to suggest mechanical obstruction.  SOFT TISSUES: There are infiltrative changes and ill-defined fluid noted overlying the subcutaneous soft tissues of the left posterior pelvis and hip (201-179) sees.  BONES: No acute fracture or aggressive osseous lesion.  Status post anterior fusion of the lower cervical spine.  THORACIC SPINE: The alignment is anatomic.  There is no fracture or traumatic subluxation. The vertebral body heights are well maintained.  There are multilevel degenerative changes of the mid to distal thoracic spine with anterior marginal spurring noted..  No significant central canal stenosis is demonstrated.  The neural foramina are patent throughout.  The paravertebral soft tissues are within normal limits. LUMBAR SPINE: The alignment is anatomic.  There is no fracture or traumatic subluxation. The vertebral body heights are well maintained.  There is multilevel degenerative disc disease of the lumbar spine with changes most prominently noted at the L1-L2 and L2-L3 levels with associated anterior osteophytosis.  No definite spondylolisthesis..  No significant central canal stenosis is demonstrated.  There is some mild bilateral neuroforamina narrowing noted at the L5-S1 level.  Mild left narrowing with associated facet hypertrophy may be noted at the L4-L5 level..  The paravertebral soft tissues are within normal limits.    1.  No definite CT evidence of acute thoracic vascular injury. 2.  There is a solid spiculated  lesion located within the left lower lobe which measures 1.8 cm in diameter.  This is suspicious for possible neoplasm.  Additional nodular densities are noted within the left upper lobe and right lower lobe as described above.  Recommend either a PET/CT for further evaluation or possible tissue sampling. 3.  No focal pulmonary consolidation, pleural effusions or pneumothorax. 4.  No definite CT evidence of abdominal or pelvic visceral/vascular injury. 5.  No intra-abdominal/pelvic fluid collections pneumoperitoneum. 6.  No acute thoracic or lumbar vertebral body compression/fracture. 7.  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 8. other findings as stated above. Signed by Terrence Borjas MD    CT lumbar spine wo IV contrast    Result Date: 12/1/2024  STUDY: CT Chest, Abdomen, and Pelvis with IV Contrast, CT Thoracic Spine and Lumbar Spine without IV Contrast; 12/01/2024 1:20 PM INDICATION: Fall - on blood thinners. COMPARISON: CT cervical spine 10/12/2023. ACCESSION NUMBER(S): [id number], [id number], [id number] ORDERING CLINICIAN: LINDA WINTERS TECHNIQUE: CT of the chest, abdomen, and pelvis was performed.  Contiguous axial images were obtained at 3 mm slice thickness through the chest, abdomen, and pelvis.  Coronal and sagittal reconstructions at 3 mm slice thickness were performed.  Omnipaque 350 60 mL was administered intravenously.  Please note that spinal images were generated from the original CT abdomen and pelvis imaging. FINDINGS: CHEST: MEDIASTINUM: The heart is normal in size without pericardial effusion.  Aortic valvular and coronary artery calcifications are visualized.  Central vascular structures opacify normally.  LUNGS/PLEURA: There is no pleural effusion, pleural thickening, or pneumothorax. The airways are patent. The lungs demonstrate emphysematous changes bilaterally with a predominantly centrilobular distribution in an upper  lobe predilection.  There is a spiculated lesion located within the left lower lobe measuring 1.8 cm in diameter (204-156) .  An additional ill-defined nodular density is noted within the left upper lobe which measures 1.8 cm in diameter (204-160) .  There is an ill-defined groundglass nodular density located within the right lower lobe measuring 9 mm in diameter (204-188).  The graft there is biapical pleural-parenchymal scarring noted. LYMPH NODES: There is no evidence of significant thoracic lymphadenopathy by CT size criteria.. ABDOMEN:  LIVER: No hepatomegaly.  Smooth surface contour.  Normal attenuation.  There are subcentimeter hypoattenuating lesions noted within the liver. Largest measures 9 mm in diameter (201-102).  These are too small to characterize by size criteria.  There is an additional hypodense lesion located within left hepatic lobe measuring 1.4 cm in diameter (201-94) which likely represents a cyst or hemangioma.  BILE DUCTS: No intrahepatic or extrahepatic biliary ductal dilatation.  GALLBLADDER: The gallbladder is visualized.  Radiopaque calculi are noted. STOMACH: No abnormalities identified.  PANCREAS: No masses or ductal dilatation.  SPLEEN: There are calcifications noted within the spleen and liver which may represent sequelae of previous granulomatous disease.  ADRENAL GLANDS: No thickening or nodules.  KIDNEYS AND URETERS: Kidneys are normal in size and location.  No renal or ureteral calculi.  There are bilateral renal cysts noted.  The largest within the right kidney measures proximally 4.9 cm in diameter.  No hydronephrosis or nephrolithiasis.  PELVIS:  BLADDER: No abnormalities identified.  REPRODUCTIVE ORGANS: No abnormalities identified.  BOWEL: No abnormalities identified.  VESSELS: There are severe calcific atherosclerotic changes of the abdominal aorta and iliac vessels.  There is an infrarenal abdominal aortic aneurysm noted containing mural thrombus which measures up to 4.1  cm in diameter (201-138).  The visceral vessels enhance normally.  The inferior mesenteric artery is not clearly visualized..  There are severe calcific atherosclerotic changes of the iliac vessels. Calcific atherosclerotic changes of the internal iliac arteries are noted bilaterally.  PERITONEUM/RETROPERITONEUM/LYMPH NODES: No free fluid.  No pneumoperitoneum. There is no evidence of significant abdominal or pelvic lymphadenopathy by CT size criteria.  ABDOMINAL WALL: There is colonic calculus is noted without CT evidence of diverticulitis.  The appendix is not clearly visualized.  There is no definite evidence of bowel wall thickening or dilatation to suggest mechanical obstruction.  SOFT TISSUES: There are infiltrative changes and ill-defined fluid noted overlying the subcutaneous soft tissues of the left posterior pelvis and hip (201-179) sees.  BONES: No acute fracture or aggressive osseous lesion.  Status post anterior fusion of the lower cervical spine.  THORACIC SPINE: The alignment is anatomic.  There is no fracture or traumatic subluxation. The vertebral body heights are well maintained.  There are multilevel degenerative changes of the mid to distal thoracic spine with anterior marginal spurring noted..  No significant central canal stenosis is demonstrated.  The neural foramina are patent throughout.  The paravertebral soft tissues are within normal limits. LUMBAR SPINE: The alignment is anatomic.  There is no fracture or traumatic subluxation. The vertebral body heights are well maintained.  There is multilevel degenerative disc disease of the lumbar spine with changes most prominently noted at the L1-L2 and L2-L3 levels with associated anterior osteophytosis.  No definite spondylolisthesis..  No significant central canal stenosis is demonstrated.  There is some mild bilateral neuroforamina narrowing noted at the L5-S1 level.  Mild left narrowing with associated facet hypertrophy may be noted at the  L4-L5 level..  The paravertebral soft tissues are within normal limits.    1.  No definite CT evidence of acute thoracic vascular injury. 2.  There is a solid spiculated lesion located within the left lower lobe which measures 1.8 cm in diameter.  This is suspicious for possible neoplasm.  Additional nodular densities are noted within the left upper lobe and right lower lobe as described above.  Recommend either a PET/CT for further evaluation or possible tissue sampling. 3.  No focal pulmonary consolidation, pleural effusions or pneumothorax. 4.  No definite CT evidence of abdominal or pelvic visceral/vascular injury. 5.  No intra-abdominal/pelvic fluid collections pneumoperitoneum. 6.  No acute thoracic or lumbar vertebral body compression/fracture. 7.  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 8. other findings as stated above. Signed by Terrence Borjas MD    CT head wo IV contrast    Result Date: 12/1/2024  Interpreted By:  Hai Kruger, STUDY: CT HEAD WO IV CONTRAST; ;  12/1/2024 1:10 pm   INDICATION: Signs/Symptoms:fall on thinners.   COMPARISON: 10/12/2023   ACCESSION NUMBER(S): [id number]   ORDERING CLINICIAN: LINDA WINTERS   TECHNIQUE: Contiguous unenhanced axial CT sections are performed from the skull base to the vertex.   The study is limited by motion degradation.   FINDINGS: There is mucoperiosteal thickening of the ethmoid air cells and maxillary sinuses. A fluid level is identified in the left frontal compartment and small fluid level in the left maxillary sinus. The mastoid air cells are clear. There is right periorbital hematoma and hematoma overlying the frontal calvarium at the midline and to the left of midline. There is some soft tissue swelling also noted over the posterior parietal calvarium. There is no sign of depressed calvarial fracture. The visualized osseous structures are intact.   There is moderate to severe  generalized parenchymal volume loss with enlargement of the cortical sulci and CSF spaces similar to the previous study. There is diffuse hypoattenuation in the cerebral white matter bilaterally compatible with small-vessel ischemia in greater on the right. These findings are also similar to the previous study. 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 These findings are also unchanged. Old infarct is also suspected in the posterior right cerebellum which is stable as well.   There is no sign of parenchymal hematoma or dense extra-axial fluid collection. There is no mass effect or midline shift. The gray matter/white matter distribution is preserved.       Soft tissue hematomas are identified overlying the right orbit, maxilla, and calvarium as described above. There is no acute fracture.   Severe age-related atrophy and small-vessel ischemic changes of the cerebral white matter.   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 These findings have remained unchanged.   No CT evidence of acute intracranial hemorrhage or mass effect.   Mucoperiosteal thickening of the ethmoid and maxillary sinuses. There is fluid level in the left frontal compartment and left maxillary sinus which could reflect acute inflammatory changes. If there is clinical concern for occult facial fracture, CT examination can be performed.     MACRO: None   Signed by: Hai Kruger 12/1/2024 1:29 PM Dictation workstation:   [id number]    CT cervical spine wo IV contrast    Result Date: 12/1/2024  Interpreted By:  Hai Kruger, STUDY: CT CERVICAL SPINE WO IV CONTRAST; ;  12/1/2024 1:10 pm   INDICATION: Signs/Symptoms:fall on thinners.   COMPARISON: 10/12/2023   ACCESSION NUMBER(S): [id number]   ORDERING CLINICIAN: LINDA  SPIRNAK   TECHNIQUE: Contiguous axial CT sections are performed from the skullbase to the upper thoracic spine and supplemented with coronal and sagittal reformatted images.   FINDINGS: The patient is status post anterior cervical fusion from C4 through C7 with plate and screw fixation. The hardware is intact. There is no lucency surrounding the hardware. The appearance is stable from 10/12/2023.   There is mild levo convexity of the lumbar spine and straightening of the lumbar lordosis. The facet joints align normally.   There is cervical spondylosis with disc space narrowing at C7-T1 and to a lesser extent C3-4. This appearance is also stable.   The cervical vertebral body heights are maintained. The C1 ring is intact. There is no sign of cervical vertebral fracture. There is no bone destruction or aggressive periosteal reaction. No lytic or blastic lesion is detected.   There is multiple bulging disc and marginal osteophyte with multilevel central canal narrowing which is greatest at C5-6 and C6-7. This appearance is also unchanged. There is bilateral multilevel neural foraminal narrowing secondary to facet and uncovertebral arthrosis which is greatest at C3-4 on the right. Multilevel hypertrophic facet arthrosis is most pronounced at C2-3 on the left at C3-4 on the right.   The surrounding soft tissues demonstrate biapical pleural and parenchymal scarring with emphysematous changes. There is no prevertebral soft tissue swelling or retropharyngeal air.       Status post multilevel anterior cervical fusion from C4 through C7. There is no hardware failure.   No acute fracture or subluxation.   Multilevel degenerative changes with central canal and neural foraminal narrowing as detailed above, stable from 10/12/2023.     MACRO: None   Signed by: Hai Kruger 12/1/2024 1:22 PM Dictation workstation:   [id number]       Assessment/Plan   Assessment & Plan  Pulmonary embolism on right (Multi)    PE (pulmonary  thromboembolism) (Multi)    HTN (hypertension)    Incidental pulmonary nodule    Chronic hypoxic respiratory failure, on home oxygen therapy (Multi)    Rhabdomyolysis    Elevated troponin    Elevated brain natriuretic peptide (BNP) level    Leukocytosis    Metabolic encephalopathy    Delirium    Based on my assessment today patient does not have capacity to make decisions  Discussed with the daughter at the bedside.  Recommend next of kin or healthcare power of  to make decisions in the best interest of the patient  Treat any primary cause of confusion  Delirium protocol  Please call me if needed         Reji Deleon MD

## 2024-12-04 NOTE — PROGRESS NOTES
"Inpatient Speech Language Pathology Treatment Note     Patient Name: Lorne Avina  MRN: [id number]  : 1940  Patient Room: [address] [place], [place] [address]  Today's Date: 24  Time Calculation  Start Time: 1000  Stop Time: 1020  Time Calculation (min): 20 min     PLAN:  Skilled speech therapy for dysphagia treatment continues to be warranted to complete oropharyngeal strengthening exercises, for pt/caregiver education in order to reduce risk of aspiration, dehydration and malnutrition. , to determine ability to upgrade diet after PO trials with SLP.    SLP TX Plan: Continue Plan of Care  SLP Frequency: 3x per week  Discussed POC: Patient  Discussed Risks/Benefits: Patient  Patient/Caregiver Agreeable: Yes  Continue skilled SLP at next level of care: N/A    Recommendations:   Solid Diet Recommendations: NPO  Liquid Diet Recommendations: Ice chips after oral care (2-3 small chips), per MBSS recommendations  Strategies: Frequent oral care  Medication administration: NPO    SLP Assessment:  Patient seen for dysphagia management wife patient's daughter present. Patient presenting with confusion and delirium throughout session. Patient requested ice cream and Rum with coke. Patient provided extensive  education on aspiration risks with thin liquids with pt verbalizing understanding, but later requesting ice cream again stating \"you see it in all the movies\". Patient reported when staff complete oral care it causes him pain. When asked if he would like to complete oral care on himself, pt agreeable. Pt provided with oral swab, pt politely declined. Patient did allow SLP to provide oral care to his lips and apply moisturizer, but reported pain when SLP attempted further oral care within the oral cavity. Further education provided regarding importance of oral care to reduce bacteria and changing oral swabs after each use with pt's wife verbalizing understanding. Patient was given x5 ice chips with a delayed throat clear and delayed " cough following subsequent trials. Wet vocal quality noted improved by independent throat clear. Patient completed x10 effortful swallows and x5 volitional swallows given moderate verbal/tactile cues.       Subjective:  Pt. Seen at bedside for skilled dysphagia treatment.   Prior to Session Communication: Bedside nurse    Pain:  Ratin-10   Pt report: 0  Action taken: none needed         Oxygen Status:   nasal cannula          Goals:   Short term goals established 24:    Patient/Family will verbalize/demonstrate comprehension of dysphagia  education, strategies, recommendations and POC.    Progress: Educated provided on aspiration risks, oral care, and pharyngeal strengthening with pt and daughter verbalizing understanding. Unclear pt's full comprehension of education topics due to increased levels of confusion. Pt demonstrating poor carry-over of info following a 5 minute time lapse.     Status: Continue    2.   Patient will complete recommended swallowing exercises  (effortful swallow ) for at least 30 repetitions during treatment  session given minimal-moderate cues.    Progress: Patient was able to complete x10 effortful swallows given mod verbal/tactile cues.     Status: Continue    3.   Patient will tolerate PO trials of ice chips with consistent  swallow elicited and without overt s./s of aspiration in 90% of  trials.    Progress: Patient given x5 ice chips with overt s/s of aspiration noted with x1 delayed throat clear and delayed cough. Pt with audible wet vocal quality with subsequent trials; pt ind throat clearing with improved vocal quality.    Status: Continue        Treatment Outcome:  SLP TX Intervention Outcome: Making Progress Towards Goals    Treatment Tolerance: Patient tolerated treatment well   Prognosis: Guarded   Barriers: Cognition, Comorbidities   Medical Staff Made Aware: Yes         SLP Inpatient Education:  Learner family, patient   Barriers to Learning Acuteness of the illness,  Cognitive limitations   Method Verbal, Demonstration   Education - Topic ST provided patient education regarding role of ST, purpose of treatment, oral care, aspiration risks, pharyngeal strengthening exercises     Outcome    0= unable to meet

## 2024-12-04 NOTE — PROGRESS NOTES
Lorne Avina is a 84 y.o. male on day 2 of admission presenting with Pulmonary embolism on right (Multi).      Subjective   84 y.o. male   PMH; HTN, HLD, infrarenal AAA, Hx of SVT s/p ablation, CVA with residual left-sided weakness, chronic hypoxic respiratory failure 2/2 COPD on 2 L NC prn, BPH and mild cognitive impairment         Objective          Vitals 24HR  Heart Rate:  [84-96]   Temp:  [36 °C (96.8 °F)-36.3 °C (97.3 °F)]   Resp:  [12-16]   BP: ()/(57-74)   SpO2:  [93 %-100 %]     Intake/Output last 3 Shifts:    Intake/Output Summary (Last 24 hours) at 12/4/2024 1033  Last data filed at 12/4/2024 0600  Gross per 24 hour   Intake 1264.33 ml   Output 500 ml   Net 764.33 ml       Physical Exam  GENERAL: No acute distress.   SKIN: Skin color, texture, turgor normal. No rashes or lesions.  EYES: PERRLA, EOMI  HEENT: Head: Normocephalic  Neck: supple and no adenopathy  LUNGS: Lungs clear to auscultation.   CARDIAC: Normal S1 and S2; no rubs, murmurs, or gallops  ABDOMEN: Abdomen soft, non-tender.   EXTREMITIES: No ulcers, Extremities normal.   NEURO: Responsive.     Relevant Results             Scheduled medications  [Held by provider] atorvastatin, 20 mg, oral, Nightly  [Held by provider] budesonide, 0.5 mg, nebulization, BID  [Held by provider] clopidogrel, 75 mg, oral, Daily  [Held by provider] donepezil, 10 mg, oral, Nightly  [Held by provider] formoterol, 20 mcg, nebulization, BID  [Held by provider] ipratropium, 0.5 mg, nebulization, TID  [Held by provider] mirtazapine, 7.5 mg, oral, Nightly  potassium chloride CR, 10 mEq, oral, Once  [Held by provider] sennosides-docusate sodium, 2 tablet, oral, BID  [Held by provider] tamsulosin, 0.4 mg, oral, Nightly      Continuous medications  dextrose 5 % and lactated Ringer's, 75 mL/hr, Last Rate: 75 mL/hr (12/04/24 0300)  heparin, 0-4,500 Units/hr, Last Rate: 900 Units/hr (12/03/24 1415)      PRN medications  PRN medications: [Held by provider] acetaminophen  **OR** [Held by provider] acetaminophen **OR** [Held by provider] acetaminophen, acetaminophen, albuterol, heparin, [Held by provider] ondansetron **OR** [Held by provider] ondansetron, ondansetron, oxygen  Results for orders placed or performed during the hospital encounter of 12/01/24 (from the past 24 hours)   Lactate   Result Value Ref Range    Lactate 2.8 (H) 0.4 - 2.0 mmol/L   PST Top   Result Value Ref Range    Extra Tube Hold for add-ons.    Lactate   Result Value Ref Range    Lactate 3.2 (H) 0.4 - 2.0 mmol/L   Urinalysis with Reflex Culture and Microscopic   Result Value Ref Range    Color, Urine Yellow Light-Yellow, Yellow, Dark-Yellow    Appearance, Urine Ex.Turbid (N) Clear    Specific Gravity, Urine 1.040 (N) 1.005 - 1.035    pH, Urine 6.0 5.0, 5.5, 6.0, 6.5, 7.0, 7.5, 8.0    Protein, Urine 20 (TRACE) NEGATIVE, 10 (TRACE), 20 (TRACE) mg/dL    Glucose, Urine Normal Normal mg/dL    Blood, Urine NEGATIVE NEGATIVE    Ketones, Urine 10 (1+) (A) NEGATIVE mg/dL    Bilirubin, Urine NEGATIVE NEGATIVE    Urobilinogen, Urine Normal Normal mg/dL    Nitrite, Urine NEGATIVE NEGATIVE    Leukocyte Esterase, Urine NEGATIVE NEGATIVE   Extra Urine Gray Tube   Result Value Ref Range    Extra Tube Hold for add-ons.    Urinalysis Microscopic   Result Value Ref Range    WBC, Urine 6-10 (A) 1-5, NONE /HPF    RBC, Urine 6-10 (A) NONE, 1-2, 3-5 /HPF    Mucus, Urine FEW Reference range not established. /LPF    Amorphous Crystals, Urine 1+ NONE, 1+, 2+ /HPF   Protein, Urine Random   Result Value Ref Range    Total Protein, Urine Random 31 (H) 5 - 25 mg/dL    Creatinine, Urine Random 113.9 20.0 - 370.0 mg/dL    T. Protein/Creatinine Ratio 0.27 (H) 0.00 - 0.17 mg/mg Creat   Lactate   Result Value Ref Range    Lactate 1.8 0.4 - 2.0 mmol/L   Basic Metabolic Panel   Result Value Ref Range    Glucose 98 74 - 99 mg/dL    Sodium 146 (H) 136 - 145 mmol/L    Potassium 3.4 (L) 3.5 - 5.3 mmol/L    Chloride 109 (H) 98 - 107 mmol/L     Bicarbonate 32 21 - 32 mmol/L    Anion Gap 8 (L) 10 - 20 mmol/L    Urea Nitrogen 39 (H) 6 - 23 mg/dL    Creatinine 0.65 0.50 - 1.30 mg/dL    eGFR >90 >60 mL/min/1.73m*2    Calcium 8.0 (L) 8.6 - 10.3 mg/dL   Basic Metabolic Panel   Result Value Ref Range    Glucose 90 74 - 99 mg/dL    Sodium 148 (H) 136 - 145 mmol/L    Potassium 3.8 3.5 - 5.3 mmol/L    Chloride 110 (H) 98 - 107 mmol/L    Bicarbonate 33 (H) 21 - 32 mmol/L    Anion Gap 9 (L) 10 - 20 mmol/L    Urea Nitrogen 35 (H) 6 - 23 mg/dL    Creatinine 0.59 0.50 - 1.30 mg/dL    eGFR >90 >60 mL/min/1.73m*2    Calcium 8.2 (L) 8.6 - 10.3 mg/dL   Magnesium   Result Value Ref Range    Magnesium 1.96 1.60 - 2.40 mg/dL   Comprehensive Metabolic Panel   Result Value Ref Range    Glucose 96 74 - 99 mg/dL    Sodium 148 (H) 136 - 145 mmol/L    Potassium 3.6 3.5 - 5.3 mmol/L    Chloride 111 (H) 98 - 107 mmol/L    Bicarbonate 31 21 - 32 mmol/L    Anion Gap 10 10 - 20 mmol/L    Urea Nitrogen 33 (H) 6 - 23 mg/dL    Creatinine 0.56 0.50 - 1.30 mg/dL    eGFR >90 >60 mL/min/1.73m*2    Calcium 8.2 (L) 8.6 - 10.3 mg/dL    Albumin 2.6 (L) 3.4 - 5.0 g/dL    Alkaline Phosphatase 44 33 - 136 U/L    Total Protein 4.8 (L) 6.4 - 8.2 g/dL    AST 45 (H) 9 - 39 U/L    Bilirubin, Total 1.0 0.0 - 1.2 mg/dL    ALT 28 10 - 52 U/L   CBC   Result Value Ref Range    WBC 6.8 4.4 - 11.3 x10*3/uL    nRBC 0.0 0.0 - 0.0 /100 WBCs    RBC 3.48 (L) 4.50 - 5.90 x10*6/uL    Hemoglobin 10.5 (L) 13.5 - 17.5 g/dL    Hematocrit 34.0 (L) 41.0 - 52.0 %    MCV 98 80 - 100 fL    MCH 30.2 26.0 - 34.0 pg    MCHC 30.9 (L) 32.0 - 36.0 g/dL    RDW 13.7 11.5 - 14.5 %    Platelets 119 (L) 150 - 450 x10*3/uL   Heparin Assay   Result Value Ref Range    Heparin Unfractionated 0.3 See Comment Below for Therapeutic Ranges IU/mL   Creatine Kinase   Result Value Ref Range    Creatine Kinase 466 (H) 0 - 325 U/L         Assessment/Plan   This patient currently has cardiac telemetry ordered; if you would like to modify or discontinue  the telemetry order, click here to go to the orders activity to modify/discontinue the order.    Assessment & Plan  Pulmonary embolism on right (Multi)    PE (pulmonary thromboembolism) (Multi)    HTN (hypertension)    Incidental pulmonary nodule    Chronic hypoxic respiratory failure, on home oxygen therapy (Multi)    Rhabdomyolysis    Elevated troponin    Elevated brain natriuretic peptide (BNP) level    Leukocytosis    Metabolic encephalopathy    // PE (pulmonary thromboembolism) (Multi)  Supplemental oxygen, goal O2 sat greater than 90%  Continuous telemetry monitoring  Incentive spirometry every hour while awake  Continue with close neurovascular monitoring  C/w heparin drip until we have a feeding plan.     // Failed swallow eval  - MBS was reviewed.   - DW daughter at bedside   - DW .   - Palliative is consulted.   - will likely need a PEG tube if no plans for hospice     // Elevated BUN   - Check cystain C  - Check Cr Cl.     // Lactic acidosis  - Blood cultures are negative to date.   - Stop Zosyn.        // Acute change in mental status.   - CT brain is negative.   - Ammonia normal.   - No CO2 retention.   - back to baseline.   - Neurology note reviewed.      // HTN (hypertension)  Home meds Plavix, Coreg, and Lipitor     // Elevated HDL  Home Lipitor  Heart Healthy diet when cleared to take p.o.     // Incidental pulmonary nodule  Again noted are approximately 3 nodular lesions involving the left upper lobe, left lower lobe and right lower lobe unchanged from exam from the same day measuring up to approximately 2 cm involving the left upper lobe and left lower lobe.  Findings concerning for possible neoplasm.    Recommend further evaluation with PET/CT.     // Chronic hypoxic respiratory failure, on home oxygen therapy (Multi)  Home oxygen PRN 2 L NC   Resume Home inhalers  Monitor oxygen requirements       Obey Jennings MD

## 2024-12-04 NOTE — CONSULTS
Reason For Consult  Goals of care    History Of Present Illness  Lorne Avina is a 84 y.o. male presented to ED after being found by daughter at home.  Believed to be down for approximately 3 days.  Patient reportedly was found to be wedged between his bed and nightstand, incontinent of urine. Patient is confused, alert to self.   Noted are approximately 3 nodular lesions involving the left upper lobe, left lower lobe and right lower lobe unchanged from exam from the same day measuring up to approximately 2 cm involving the left upper lobe and left lower lobe.  Findings concerning for possible neoplasm.    Code status is DNR.    Past Medical History  He has a past medical history of AAA (abdominal aortic aneurysm) (CMS-HCC), BPH (benign prostatic hyperplasia), Cognitive impairment, COPD (chronic obstructive pulmonary disease) (Multi), ETOH abuse, Falls, GERD (gastroesophageal reflux disease), HLD (hyperlipidemia), Hypertension, Non-compliance, Pericardial effusion ([facility]), Pulmonary embolism, Pulmonary nodules, Stroke (Multi), and SVT (supraventricular tachycardia) (CMS-HCC).       Assessment/Plan   Palliative consulted for goals of care.   Patient is from home alone and was found down on floor bu daughter for unknown time but believed to be several days. Patient remains confused at this time. Will reach out to patient's daughter, Rose Mary, for goals of care conversation.     1012  Spoke with patient's daughter, Rose Mary, at length. She advised he was aware of nodules and has been seen for them. He was supposed to have a CT to follow up but patient refused to go to CT appointment.   Discussed palliative care vs hospice and goals. Patient is confused and daughter is not sure how to get him to agree to anything. She is his healthcare poa and aware she will make decisions but in the past, she has put things in place such as hhc and he has then refused it.   Explained we can have psych evaluate for capacity and she is  agreeable to that.   Reviewed options of palliative, hospice or skilled facility, pending how she wants to move forward. Patient has not done well in a skilled facility in the past and has a history of refusing hhc and help at home.   Rose Mary is aware palliative will continue to follow and assist with outpatient palliative referral or hospice referral if that is what she decides. Explained that care transitions will also meet with her and assist with discharge plan should patient need skilled or hhc. Therapy evals are penidng at this time.  She expressed understanding and was thankful for call.   TCC updated and asked her to follow up with Rose Mary. NP placed consult for capacity eval.   Palliative will continue to follow.         Ana Lilia Pal LCSW

## 2024-12-05 LAB
ALBUMIN SERPL BCP-MCNC: 2.3 G/DL (ref 3.4–5)
ALP SERPL-CCNC: 43 U/L (ref 33–136)
ALT SERPL W P-5'-P-CCNC: 22 U/L (ref 10–52)
ANION GAP SERPL CALC-SCNC: 9 MMOL/L (ref 10–20)
AST SERPL W P-5'-P-CCNC: 36 U/L (ref 9–39)
BACTERIA BLD CULT: NORMAL
BACTERIA BLD CULT: NORMAL
BILIRUB SERPL-MCNC: 0.9 MG/DL (ref 0–1.2)
BUN SERPL-MCNC: 19 MG/DL (ref 6–23)
CALCIUM SERPL-MCNC: 8 MG/DL (ref 8.6–10.3)
CHLORIDE SERPL-SCNC: 110 MMOL/L (ref 98–107)
CK SERPL-CCNC: 281 U/L (ref 0–325)
CO2 SERPL-SCNC: 31 MMOL/L (ref 21–32)
CREAT SERPL-MCNC: 0.44 MG/DL (ref 0.5–1.3)
EGFRCR SERPLBLD CKD-EPI 2021: >90 ML/MIN/1.73M*2
ERYTHROCYTE [DISTWIDTH] IN BLOOD BY AUTOMATED COUNT: 13.7 % (ref 11.5–14.5)
GLUCOSE SERPL-MCNC: 113 MG/DL (ref 74–99)
HCT VFR BLD AUTO: 31.9 % (ref 41–52)
HGB BLD-MCNC: 9.8 G/DL (ref 13.5–17.5)
HOLD SPECIMEN: NORMAL
MAGNESIUM SERPL-MCNC: 1.9 MG/DL (ref 1.6–2.4)
MCH RBC QN AUTO: 30.1 PG (ref 26–34)
MCHC RBC AUTO-ENTMCNC: 30.7 G/DL (ref 32–36)
MCV RBC AUTO: 98 FL (ref 80–100)
NRBC BLD-RTO: 0 /100 WBCS (ref 0–0)
PLATELET # BLD AUTO: 130 X10*3/UL (ref 150–450)
POTASSIUM SERPL-SCNC: 3.7 MMOL/L (ref 3.5–5.3)
PROT SERPL-MCNC: 4.4 G/DL (ref 6.4–8.2)
RBC # BLD AUTO: 3.26 X10*6/UL (ref 4.5–5.9)
SODIUM SERPL-SCNC: 146 MMOL/L (ref 136–145)
UFH PPP CHRO-ACNC: 0.2 IU/ML
UFH PPP CHRO-ACNC: 0.4 IU/ML
UFH PPP CHRO-ACNC: 0.4 IU/ML
WBC # BLD AUTO: 7 X10*3/UL (ref 4.4–11.3)

## 2024-12-05 PROCEDURE — 36415 COLL VENOUS BLD VENIPUNCTURE: CPT | Performed by: NURSE PRACTITIONER

## 2024-12-05 PROCEDURE — 83735 ASSAY OF MAGNESIUM: CPT | Performed by: NURSE PRACTITIONER

## 2024-12-05 PROCEDURE — 82550 ASSAY OF CK (CPK): CPT | Performed by: NURSE PRACTITIONER

## 2024-12-05 PROCEDURE — 92526 ORAL FUNCTION THERAPY: CPT | Mod: GN

## 2024-12-05 PROCEDURE — 2500000004 HC RX 250 GENERAL PHARMACY W/ HCPCS (ALT 636 FOR OP/ED): Performed by: INTERNAL MEDICINE

## 2024-12-05 PROCEDURE — 80053 COMPREHEN METABOLIC PANEL: CPT | Performed by: NURSE PRACTITIONER

## 2024-12-05 PROCEDURE — 85520 HEPARIN ASSAY: CPT | Performed by: INTERNAL MEDICINE

## 2024-12-05 PROCEDURE — 36415 COLL VENOUS BLD VENIPUNCTURE: CPT | Performed by: INTERNAL MEDICINE

## 2024-12-05 PROCEDURE — 2500000004 HC RX 250 GENERAL PHARMACY W/ HCPCS (ALT 636 FOR OP/ED): Performed by: NURSE PRACTITIONER

## 2024-12-05 PROCEDURE — 85027 COMPLETE CBC AUTOMATED: CPT | Performed by: NURSE PRACTITIONER

## 2024-12-05 PROCEDURE — 2060000001 HC INTERMEDIATE ICU ROOM DAILY

## 2024-12-05 RX ORDER — DEXTROSE MONOHYDRATE AND SODIUM CHLORIDE 5; .45 G/100ML; G/100ML
75 INJECTION, SOLUTION INTRAVENOUS CONTINUOUS
Status: ACTIVE | OUTPATIENT
Start: 2024-12-05 | End: 2024-12-06

## 2024-12-05 RX ADMIN — SODIUM CHLORIDE, SODIUM LACTATE, POTASSIUM CHLORIDE, CALCIUM CHLORIDE AND DEXTROSE MONOHYDRATE 75 ML/HR: 5; 600; 310; 30; 20 INJECTION, SOLUTION INTRAVENOUS at 03:21

## 2024-12-05 RX ADMIN — HEPARIN SODIUM 2000 UNITS: 5000 INJECTION INTRAVENOUS; SUBCUTANEOUS at 06:53

## 2024-12-05 RX ADMIN — DEXTROSE AND SODIUM CHLORIDE 75 ML/HR: 5; 450 INJECTION, SOLUTION INTRAVENOUS at 10:43

## 2024-12-05 RX ADMIN — HEPARIN SODIUM 1000 UNITS/HR: 10000 INJECTION, SOLUTION INTRAVENOUS at 17:21

## 2024-12-05 ASSESSMENT — PAIN - FUNCTIONAL ASSESSMENT
PAIN_FUNCTIONAL_ASSESSMENT: 0-10

## 2024-12-05 ASSESSMENT — PAIN SCALES - GENERAL
PAINLEVEL_OUTOF10: 0 - NO PAIN

## 2024-12-05 ASSESSMENT — COGNITIVE AND FUNCTIONAL STATUS - GENERAL
MOVING TO AND FROM BED TO CHAIR: TOTAL
PERSONAL GROOMING: TOTAL
WALKING IN HOSPITAL ROOM: TOTAL
HELP NEEDED FOR BATHING: TOTAL
MOVING FROM LYING ON BACK TO SITTING ON SIDE OF FLAT BED WITH BEDRAILS: TOTAL
CLIMB 3 TO 5 STEPS WITH RAILING: TOTAL
DRESSING REGULAR UPPER BODY CLOTHING: TOTAL
TURNING FROM BACK TO SIDE WHILE IN FLAT BAD: TOTAL
TOILETING: TOTAL
MOBILITY SCORE: 6
DRESSING REGULAR LOWER BODY CLOTHING: TOTAL
EATING MEALS: TOTAL
DAILY ACTIVITIY SCORE: 6
STANDING UP FROM CHAIR USING ARMS: TOTAL

## 2024-12-05 NOTE — CARE PLAN
Problem: Pain - Adult  Goal: Verbalizes/displays adequate comfort level or baseline comfort level  Outcome: Met     Problem: Safety - Adult  Goal: Free from fall injury  Outcome: Met     Problem: Skin  Goal: Prevent/manage excess moisture  Recent Flowsheet Documentation  Taken 12/5/2024 1807 by Radha Gomez RN  Prevent/manage excess moisture:   Cleanse incontinence/protect with barrier cream   Moisturize dry skin  Goal: Prevent/manage excess moisture  Outcome: Met  Flowsheets (Taken 12/5/2024 1807)  Prevent/manage excess moisture:   Cleanse incontinence/protect with barrier cream   Moisturize dry skin     Pt remained HDS this shift. Oxygen saturation remained above 92% on 3LNC. Pt alert to self this shift, frequent reorientation provided. Plan for hospice meeting tomorrow with daughter. Safety maintained. Alarms on.

## 2024-12-05 NOTE — PROGRESS NOTES
12/05/24 1145   Discharge Planning   Living Arrangements Alone   Support Systems Family members     Per Palliative Care there with be a hospice meeting with [facility] tomorrow.

## 2024-12-05 NOTE — CARE PLAN
Problem: Fall/Injury  Goal: Verbalize understanding of personal risk factors for fall in the hospital  Outcome: Not Progressing  Goal: Verbalize understanding of risk factor reduction measures to prevent injury from fall in the home  Outcome: Not Progressing  Goal: Use assistive devices by end of the shift  Outcome: Not Progressing     Problem: Skin  Goal: Participates in plan/prevention/treatment measures  Outcome: Not Progressing  Goal: Promote/optimize nutrition  Outcome: Not Progressing  Goal: Prevent/manage excess moisture  Outcome: Not Progressing  Goal: Prevent/minimize sheer/friction injuries  Outcome: Not Progressing     Problem: Respiratory  Goal: Clear secretions with interventions this shift  Outcome: Progressing  Goal: Minimize anxiety/maximize coping throughout shift  Outcome: Progressing  Goal: Increase self care and/or family involvement in next 24 hours  Outcome: Progressing     The clinical goals for the shift include patient will maintain a pulse ox greater than 92% this shift. Goal met. Patient is on 3L NC. Patient has maintained his oxygen saturation above 92%. Patient is stable with no acute changes at this time.     Safety has been maintained. Bed in low/locked position. Call light is within patient's reach.

## 2024-12-05 NOTE — PROGRESS NOTES
Lorne Avina is a 84 y.o. male on day 3 of admission presenting with Pulmonary embolism on right (Multi).      Subjective   84 y.o. male   PMH; HTN, HLD, infrarenal AAA, Hx of SVT s/p ablation, CVA with residual left-sided weakness, chronic hypoxic respiratory failure 2/2 COPD on 2 L NC prn, BPH and mild cognitive impairment    No issues overnight.        Objective          Vitals 24HR  Heart Rate:  [75-92]   Temp:  [36.2 °C (97.2 °F)-36.7 °C (98.1 °F)]   Resp:  [13-21]   BP: (118-139)/(55-70)   SpO2:  [94 %-100 %]     Intake/Output last 3 Shifts:    Intake/Output Summary (Last 24 hours) at 12/5/2024 1010  Last data filed at 12/5/2024 0300  Gross per 24 hour   Intake --   Output 600 ml   Net -600 ml       Physical Exam  GENERAL: No acute distress.   SKIN: Skin color, texture, turgor normal. No rashes or lesions.  EYES: PERRLA, EOMI  HEENT: Head: Normocephalic  Neck: supple and no adenopathy  LUNGS: Lungs clear to auscultation.   CARDIAC: Normal S1 and S2; no rubs, murmurs, or gallops  ABDOMEN: Abdomen soft, non-tender.   EXTREMITIES: No ulcers, Extremities normal.   NEURO: Responsive.     Relevant Results             Scheduled medications  [Held by provider] atorvastatin, 20 mg, oral, Nightly  [Held by provider] budesonide, 0.5 mg, nebulization, BID  [Held by provider] clopidogrel, 75 mg, oral, Daily  [Held by provider] donepezil, 10 mg, oral, Nightly  [Held by provider] formoterol, 20 mcg, nebulization, BID  [Held by provider] ipratropium, 0.5 mg, nebulization, TID  [Held by provider] mirtazapine, 7.5 mg, oral, Nightly  potassium chloride CR, 10 mEq, oral, Once  [Held by provider] sennosides-docusate sodium, 2 tablet, oral, BID  [Held by provider] tamsulosin, 0.4 mg, oral, Nightly      Continuous medications  dextrose 5 % and lactated Ringer's, 75 mL/hr, Last Rate: 75 mL/hr (12/05/24 0321)  heparin, 0-4,500 Units/hr, Last Rate: 1,000 Units/hr (12/05/24 0812)      PRN medications  PRN medications: [Held by provider]  acetaminophen **OR** [Held by provider] acetaminophen **OR** [Held by provider] acetaminophen, acetaminophen, albuterol, heparin, [Held by provider] ondansetron **OR** [Held by provider] ondansetron, ondansetron, oxygen  Results for orders placed or performed during the hospital encounter of 12/01/24 (from the past 24 hours)   Protein, Total 24 Hour Urine   Result Value Ref Range    Collection period 24 hrs    Urine Volume 600 mL    Total Protein, Urine 42 (H) 5 - 25 mg/dL    Total Protein,  24 Hour Urine 252 (H) 0 - 149 mg/24h    Creatinine, Urine 124.5 20.0 - 370.0 mg/dL    Creatinine, 24 Hour Urine 0.75 (L) 0.87 - 2.41 g/24 h   Magnesium   Result Value Ref Range    Magnesium 1.90 1.60 - 2.40 mg/dL   Comprehensive Metabolic Panel   Result Value Ref Range    Glucose 113 (H) 74 - 99 mg/dL    Sodium 146 (H) 136 - 145 mmol/L    Potassium 3.7 3.5 - 5.3 mmol/L    Chloride 110 (H) 98 - 107 mmol/L    Bicarbonate 31 21 - 32 mmol/L    Anion Gap 9 (L) 10 - 20 mmol/L    Urea Nitrogen 19 6 - 23 mg/dL    Creatinine 0.44 (L) 0.50 - 1.30 mg/dL    eGFR >90 >60 mL/min/1.73m*2    Calcium 8.0 (L) 8.6 - 10.3 mg/dL    Albumin 2.3 (L) 3.4 - 5.0 g/dL    Alkaline Phosphatase 43 33 - 136 U/L    Total Protein 4.4 (L) 6.4 - 8.2 g/dL    AST 36 9 - 39 U/L    Bilirubin, Total 0.9 0.0 - 1.2 mg/dL    ALT 22 10 - 52 U/L   CBC   Result Value Ref Range    WBC 7.0 4.4 - 11.3 x10*3/uL    nRBC 0.0 0.0 - 0.0 /100 WBCs    RBC 3.26 (L) 4.50 - 5.90 x10*6/uL    Hemoglobin 9.8 (L) 13.5 - 17.5 g/dL    Hematocrit 31.9 (L) 41.0 - 52.0 %    MCV 98 80 - 100 fL    MCH 30.1 26.0 - 34.0 pg    MCHC 30.7 (L) 32.0 - 36.0 g/dL    RDW 13.7 11.5 - 14.5 %    Platelets 130 (L) 150 - 450 x10*3/uL   Creatine Kinase   Result Value Ref Range    Creatine Kinase 281 0 - 325 U/L   Heparin Assay   Result Value Ref Range    Heparin Unfractionated 0.2 See Comment Below for Therapeutic Ranges IU/mL         Assessment/Plan   This patient currently has cardiac telemetry ordered; if you  would like to modify or discontinue the telemetry order, click here to go to the orders activity to modify/discontinue the order.    Assessment & Plan  Pulmonary embolism on right (Multi)    PE (pulmonary thromboembolism) (Multi)    HTN (hypertension)    Incidental pulmonary nodule    Chronic hypoxic respiratory failure, on home oxygen therapy (Multi)    Rhabdomyolysis    Elevated troponin    Elevated brain natriuretic peptide (BNP) level    Leukocytosis    Metabolic encephalopathy    // PE (pulmonary thromboembolism) (Multi)  Supplemental oxygen, goal O2 sat greater than 90%  Continuous telemetry monitoring  Incentive spirometry every hour while awake  Continue with close neurovascular monitoring  C/w heparin drip until we have a feeding plan.     // Failed swallow eval  - MBS was reviewed.   - Palliative is consulted.   - will likely need a PEG tube if no plans for hospice   - meeting with hospice tomorrow.     // Elevated BUN   - Check cystain C  - Cr Cl is preserved on 24 hour urine.   - improved.     // Lactic acidosis  - Blood cultures are negative to date.   - Stop Zosyn.        // Acute change in mental status.   - CT brain is negative.   - Ammonia normal.   - No CO2 retention.   - back to baseline.   - Neurology note reviewed.      // HTN (hypertension)  Home meds Plavix, Coreg, and Lipitor     // Elevated HDL  Home Lipitor  Heart Healthy diet when cleared to take p.o.     // Incidental pulmonary nodule  Again noted are approximately 3 nodular lesions involving the left upper lobe, left lower lobe and right lower lobe unchanged from exam from the same day measuring up to approximately 2 cm involving the left upper lobe and left lower lobe.  Findings concerning for possible neoplasm.    Recommend further evaluation with PET/CT.     // Chronic hypoxic respiratory failure, on home oxygen therapy (Multi)  Home oxygen PRN 2 L NC   Resume Home inhalers  Monitor oxygen requirements       Obey Jennings MD

## 2024-12-05 NOTE — DOCUMENTATION CLARIFICATION NOTE
PATIENT:               ADDY GAUTHIER  ACCT #:                  [id number]  MRN:                       [id number]  :                       1940  ADMIT DATE:       2024 11:46 AM  DISCH DATE:  RESPONDING PROVIDER #:        [id number]          PROVIDER RESPONSE TEXT:    I agree with dietician diagnosis of severe malnutrition on 12/3/24    CDI QUERY TEXT:    Clarification    Instruction:    Based on your assessment of the patient and the clinical information, please provide the requested documentation by clicking on the appropriate radio button and enter any additional information if prompted.    Question: Please further clarify this patient nutritional status as    When answering this query, please exercise your independent professional judgment. The fact that a question is being asked, does not imply that any particular answer is desired or expected.    The patient's clinical indicators include:  Clinical Information:  84M presents for fall; found to have rhabdo, PE, and dehydration    Clinical Indicators:  - BMI 13.63  - RD, 12/3: Severe malnutrition related to acute disease or injury As Evidenced by: severe muscle wasting, severe subcutaneous fat loss, pt consuming <50% of estimated energy needs x 5 days and pt low BMI 16.80kg/m2.    Treatment:  MBSS, Enteral nutrition, RD consult, SLP consult    Risk Factors:  CVA with residual left-sided weakness, mild cognitive impairment  Options provided:  -- I agree with dietician diagnosis of severe malnutrition on 12/3/24  -- Other - I will add my own diagnosis  -- Refer to Clinical Documentation Reviewer    Query created by: Abena Serna on 2024 4:30 PM      Electronically signed by:  CHARLIE JUNIOR MD 2024 10:09 AM

## 2024-12-06 VITALS
TEMPERATURE: 97.7 F | SYSTOLIC BLOOD PRESSURE: 115 MMHG | BODY MASS INDEX: 18.75 KG/M2 | DIASTOLIC BLOOD PRESSURE: 62 MMHG | RESPIRATION RATE: 16 BRPM | WEIGHT: 130.95 LBS | HEART RATE: 88 BPM | OXYGEN SATURATION: 94 % | HEIGHT: 70 IN

## 2024-12-06 LAB
ANION GAP SERPL CALC-SCNC: 9 MMOL/L (ref 10–20)
BUN SERPL-MCNC: 15 MG/DL (ref 6–23)
CALCIUM SERPL-MCNC: 8 MG/DL (ref 8.6–10.3)
CHLORIDE SERPL-SCNC: 106 MMOL/L (ref 98–107)
CO2 SERPL-SCNC: 31 MMOL/L (ref 21–32)
CREAT SERPL-MCNC: 0.44 MG/DL (ref 0.5–1.3)
CREAT SERPL-MCNC: 0.71 MG/DL (ref 0.69–1.22)
CYSTATIN C SERPL-MCNC: 1.18 MG/L (ref 0.61–0.95)
EGFRCR SERPLBLD CKD-EPI 2021: >90 ML/MIN/1.73M*2
EGFRCR-CYS SERPLBLD CKD-EPI 2021: 75 ML/MIN/{1.73_M2}
ERYTHROCYTE [DISTWIDTH] IN BLOOD BY AUTOMATED COUNT: 13.7 % (ref 11.5–14.5)
GLUCOSE SERPL-MCNC: 88 MG/DL (ref 74–99)
HCT VFR BLD AUTO: 32.3 % (ref 41–52)
HGB BLD-MCNC: 10 G/DL (ref 13.5–17.5)
MAGNESIUM SERPL-MCNC: 1.8 MG/DL (ref 1.6–2.4)
MCH RBC QN AUTO: 29.9 PG (ref 26–34)
MCHC RBC AUTO-ENTMCNC: 31 G/DL (ref 32–36)
MCV RBC AUTO: 96 FL (ref 80–100)
NRBC BLD-RTO: 0 /100 WBCS (ref 0–0)
PLATELET # BLD AUTO: 142 X10*3/UL (ref 150–450)
POTASSIUM SERPL-SCNC: 3.5 MMOL/L (ref 3.5–5.3)
RBC # BLD AUTO: 3.35 X10*6/UL (ref 4.5–5.9)
SODIUM SERPL-SCNC: 142 MMOL/L (ref 136–145)
UFH PPP CHRO-ACNC: 0.3 IU/ML
WBC # BLD AUTO: 7.7 X10*3/UL (ref 4.4–11.3)

## 2024-12-06 PROCEDURE — 36415 COLL VENOUS BLD VENIPUNCTURE: CPT | Performed by: NURSE PRACTITIONER

## 2024-12-06 PROCEDURE — 80048 BASIC METABOLIC PNL TOTAL CA: CPT | Performed by: NURSE PRACTITIONER

## 2024-12-06 PROCEDURE — 83735 ASSAY OF MAGNESIUM: CPT | Performed by: NURSE PRACTITIONER

## 2024-12-06 PROCEDURE — 85520 HEPARIN ASSAY: CPT | Performed by: INTERNAL MEDICINE

## 2024-12-06 PROCEDURE — 85027 COMPLETE CBC AUTOMATED: CPT | Performed by: NURSE PRACTITIONER

## 2024-12-06 PROCEDURE — 2500000004 HC RX 250 GENERAL PHARMACY W/ HCPCS (ALT 636 FOR OP/ED): Performed by: INTERNAL MEDICINE

## 2024-12-06 RX ADMIN — DEXTROSE AND SODIUM CHLORIDE 75 ML/HR: 5; 450 INJECTION, SOLUTION INTRAVENOUS at 00:03

## 2024-12-06 ASSESSMENT — COGNITIVE AND FUNCTIONAL STATUS - GENERAL
WALKING IN HOSPITAL ROOM: TOTAL
STANDING UP FROM CHAIR USING ARMS: A LOT
MOVING FROM LYING ON BACK TO SITTING ON SIDE OF FLAT BED WITH BEDRAILS: A LOT
MOBILITY SCORE: 10
TOILETING: A LOT
CLIMB 3 TO 5 STEPS WITH RAILING: TOTAL
DRESSING REGULAR UPPER BODY CLOTHING: A LOT
MOVING TO AND FROM BED TO CHAIR: A LOT
PERSONAL GROOMING: A LOT
HELP NEEDED FOR BATHING: A LOT
EATING MEALS: A LOT
DAILY ACTIVITIY SCORE: 12
TURNING FROM BACK TO SIDE WHILE IN FLAT BAD: A LOT
DRESSING REGULAR LOWER BODY CLOTHING: A LOT

## 2024-12-06 ASSESSMENT — PAIN - FUNCTIONAL ASSESSMENT
PAIN_FUNCTIONAL_ASSESSMENT: 0-10

## 2024-12-06 ASSESSMENT — PAIN SCALES - GENERAL
PAINLEVEL_OUTOF10: 0 - NO PAIN

## 2024-12-06 NOTE — PROGRESS NOTES
Lorne Avina is a 84 y.o. male on day 4 of admission presenting with Pulmonary embolism on right (Multi).      Subjective   84 y.o. male   PMH; HTN, HLD, infrarenal AAA, Hx of SVT s/p ablation, CVA with residual left-sided weakness, chronic hypoxic respiratory failure 2/2 COPD on 2 L NC prn, BPH and mild cognitive impairment    No issues overnight.        Objective          Vitals 24HR  Heart Rate:  [78-92]   Temp:  [36.2 °C (97.2 °F)-36.7 °C (98.1 °F)]   Resp:  [18-20]   BP: (125-152)/(63-79)   Weight:  [59.4 kg (130 lb 15.3 oz)]   SpO2:  [96 %-100 %]     Intake/Output last 3 Shifts:    Intake/Output Summary (Last 24 hours) at 12/6/2024 1716  Last data filed at 12/6/2024 0449  Gross per 24 hour   Intake 1357.5 ml   Output 750 ml   Net 607.5 ml       Physical Exam  GENERAL: No acute distress.   SKIN: Skin color, texture, turgor normal. No rashes or lesions.  EYES: PERRLA, EOMI  HEENT: Head: Normocephalic  Neck: supple and no adenopathy  LUNGS: Lungs clear to auscultation.   CARDIAC: Normal S1 and S2; no rubs, murmurs, or gallops  ABDOMEN: Abdomen soft, non-tender.   EXTREMITIES: No ulcers, Extremities normal.   NEURO: Responsive.     Relevant Results             Scheduled medications  [Held by provider] atorvastatin, 20 mg, oral, Nightly  [Held by provider] budesonide, 0.5 mg, nebulization, BID  [Held by provider] clopidogrel, 75 mg, oral, Daily  [Held by provider] donepezil, 10 mg, oral, Nightly  [Held by provider] formoterol, 20 mcg, nebulization, BID  [Held by provider] ipratropium, 0.5 mg, nebulization, TID  [Held by provider] mirtazapine, 7.5 mg, oral, Nightly  potassium chloride CR, 10 mEq, oral, Once  [Held by provider] sennosides-docusate sodium, 2 tablet, oral, BID  [Held by provider] tamsulosin, 0.4 mg, oral, Nightly      Continuous medications  heparin, 0-4,500 Units/hr, Last Rate: 1,000 Units/hr (12/06/24 0600)      PRN medications  PRN medications: [Held by provider] acetaminophen **OR** [Held by  provider] acetaminophen **OR** [Held by provider] acetaminophen, acetaminophen, albuterol, [Held by provider] ondansetron **OR** [Held by provider] ondansetron, ondansetron, oxygen  Results for orders placed or performed during the hospital encounter of 12/01/24 (from the past 24 hours)   Magnesium   Result Value Ref Range    Magnesium 1.80 1.60 - 2.40 mg/dL   CBC   Result Value Ref Range    WBC 7.7 4.4 - 11.3 x10*3/uL    nRBC 0.0 0.0 - 0.0 /100 WBCs    RBC 3.35 (L) 4.50 - 5.90 x10*6/uL    Hemoglobin 10.0 (L) 13.5 - 17.5 g/dL    Hematocrit 32.3 (L) 41.0 - 52.0 %    MCV 96 80 - 100 fL    MCH 29.9 26.0 - 34.0 pg    MCHC 31.0 (L) 32.0 - 36.0 g/dL    RDW 13.7 11.5 - 14.5 %    Platelets 142 (L) 150 - 450 x10*3/uL   Basic Metabolic Panel   Result Value Ref Range    Glucose 88 74 - 99 mg/dL    Sodium 142 136 - 145 mmol/L    Potassium 3.5 3.5 - 5.3 mmol/L    Chloride 106 98 - 107 mmol/L    Bicarbonate 31 21 - 32 mmol/L    Anion Gap 9 (L) 10 - 20 mmol/L    Urea Nitrogen 15 6 - 23 mg/dL    Creatinine 0.44 (L) 0.50 - 1.30 mg/dL    eGFR >90 >60 mL/min/1.73m*2    Calcium 8.0 (L) 8.6 - 10.3 mg/dL   Heparin Assay   Result Value Ref Range    Heparin Unfractionated 0.3 See Comment Below for Therapeutic Ranges IU/mL         Assessment/Plan   This patient currently has cardiac telemetry ordered; if you would like to modify or discontinue the telemetry order, click here to go to the orders activity to modify/discontinue the order.    Assessment & Plan  Pulmonary embolism on right (Multi)    PE (pulmonary thromboembolism) (Multi)    HTN (hypertension)    Incidental pulmonary nodule    Chronic hypoxic respiratory failure, on home oxygen therapy (Multi)    Rhabdomyolysis    Elevated troponin    Elevated brain natriuretic peptide (BNP) level    Leukocytosis    Metabolic encephalopathy    // PE (pulmonary thromboembolism) (Multi)    // Failed swallow eval    // Elevated BUN     // Lactic acidosis     // Acute change in mental status.     //  HTN (hypertension)     // Elevated HDL    // Incidental pulmonary nodule  Again noted are approximately 3 nodular lesions involving the left upper lobe, left lower lobe and right lower lobe unchanged from exam from the same day measuring up to approximately 2 cm involving the left upper lobe and left lower lobe.  Findings concerning for possible neoplasm.       // Chronic hypoxic respiratory failure, on home oxygen therapy (Multi)  Home oxygen PRN 2 L NC     D/w family at length. Addressed GOC and prognosis, quality of life is limited and improvement might be limited with any form of treatment.   Family elected to go with hospice.       Obey Jennings MD

## 2024-12-06 NOTE — PROGRESS NOTES
Speech-Language Pathology                 Therapy Communication Note    Patient Name: Lorne Avina  MRN: [id number]  Department: [facility]  Room: 2115/211-A  Today's Date: 12/6/2024     Discipline: Speech Language Pathology    Missed Time: Attempt    Comment:  Prior to seeing the patient for dysphagia management the bedside RN was consulted. The patient/family have decided to proceed with hospice care. Will discharge orders at this time.

## 2024-12-06 NOTE — PROGRESS NOTES
Inpatient Speech Language Pathology Treatment Note     Patient Name: Lorne Avina  MRN: [id number]  : 1940  Patient Room: [address] [place], [place] [address]  Today's Date: 24  Time Calculation  Start Time: 1410  Stop Time: 1430  Time Calculation (min): 20 min     PLAN:  Skilled speech therapy for dysphagia treatment continues to be warranted to complete oropharyngeal strengthening exercises, for pt/caregiver education in order to reduce risk of aspiration, dehydration and malnutrition. , to determine ability to upgrade diet after PO trials with SLP  SLP TX Plan: Continue Plan of Care  SLP Frequency: 3x per week  Discussed POC: Patient  Discussed Risks/Benefits: Patient  Patient/Caregiver Agreeable: Yes  Continue skilled SLP at next level of care: Yes      Recommendations:   Solid Diet Recommendations: NPO  Liquid Diet Recommendations: NPO  Compensatory strategies: upright for all po intake, strict oral care prior to ice chips  Medication administration: NPO    SLP Assessment:  Patient seen for dysphagia management. Patient demonstrating improvement in mentation this date. Pt oriented to self and type of place (hospital), but still presenting with confusion in relation to time and situation. Patient participating in conversation today with appropriate responses, improved topic maintenance, asking appropriate questions, and following simple body commands. Patient tolerated SLP providing oral care this date requesting that it be gentle. Patient tolerating PO trials of ice chips, 1/2 tsp of thin and 1/2 tsp of puree with no overt s/s of aspiration. Vocal quality noted to be wet following subsequent trials. Pt requiring min verbal cue to clear his throat with improved vocal quality. Pt continues to be at high risk for aspiration d/t overall generalized weakness, difficulty managing secretions, dependency for oral care and feeding, and reduced cognitive status.     Subjective:  Pt. Seen at bedside for skilled dysphagia treatment.    Prior to Session Communication: Bedside nurse  Pain:  [id number]-10   Pt report: 0  Action taken: none needed     Oxygen Status:   nasal cannula      Goals:   Short term goals established 24:     Patient/Family will verbalize/demonstrate comprehension of dysphagia education, strategies, recommendations and POC.     Progress: Patient educated on dysphagia, aspiration risks, and recommendations with patient verbalizing understanding. No family present during this session.     Status: Continue     2.   Patient will complete recommended swallowing exercises  (effortful swallow ) for at least 30 repetitions during treatment  session given minimal-moderate cues.     Progress: Patient was able to complete x10 effortful swallows with cold bolus given faded verbal cues.     Status: Continue     3.   Patient will tolerate PO trials of ice chips with consistent  swallow elicited and without overt s./s of aspiration in 90% of  trials.     Progress: Patient tolerated PO trials of x5 ice chips, x5 1/2 tsp of thin liquids via spoon sips, and x4 1/2 tsp of puree with consistent elicited swallow and no overt s/s of aspiration or residue noted. Patient with perceptual wet vocal quality following subsequent trials. Pt cued to throat clear with improved vocal quality.      Status: Continue    Treatment Outcome:  SLP TX Intervention Outcome: Making Progress Towards Goals    Treatment Tolerance: Patient tolerated treatment well   Prognosis: Guarded   Barriers: Comorbidities, Cognition   Medical Staff Made Aware: Yes     SLP Inpatient Education:  Learner patient, RN   Barriers to Learning Acuteness of the illness, Cognitive limitations   Method Verbal, Demonstration   Education - Topic ST provided patient education regarding pharyngeal strengthening exercises, aspiration risks,   Outcome    1= partially meets; needs review

## 2024-12-06 NOTE — SIGNIFICANT EVENT
Medical house staff was contacted by the attending physician to assist with the patient's discharge from the hospital. The attending physician has given the order for the patient to be discharged from the hospital. The consulting physician(s) have cleared the patient for discharge. Discharge plan specifics, available microbiology, available labs, available radiological studies, available immunological studies, available pathology, available cytology, meds including dosages and frequencies, and treatment plans confirmed with all providers.  Consultation of pharmacist was all sought when needed . Consultation with PT, OT, social work, diabetes educator, nutritionist, and case management was done as well.  The patient is being provided discharge orders in their discharge packet.      Patient to [facility] to [facility]. Per Hospice RN Aarti, only a discharge order is required, no med rec is needed.

## 2024-12-06 NOTE — CARE PLAN
The patient's goals for the shift include      The clinical goals for the shift include Pt will remain HDS this shift      Problem: Discharge Planning  Goal: Discharge to home or other facility with appropriate resources  Outcome: Progressing     Problem: Chronic Conditions and Co-morbidities  Goal: Patient's chronic conditions and co-morbidity symptoms are monitored and maintained or improved  Outcome: Progressing     Problem: Fall/Injury  Goal: Verbalize understanding of personal risk factors for fall in the hospital  Outcome: Progressing  Goal: Verbalize understanding of risk factor reduction measures to prevent injury from fall in the home  Outcome: Progressing  Goal: Use assistive devices by end of the shift  Outcome: Progressing  Goal: Pace activities to prevent fatigue by end of the shift  Outcome: Progressing     Problem: Skin  Goal: Decreased wound size/increased tissue granulation at next dressing change  Outcome: Progressing  Flowsheets (Taken 12/5/2024 2235)  Decreased wound size/increased tissue granulation at next dressing change:   Promote sleep for wound healing   Protective dressings over bony prominences  Goal: Participates in plan/prevention/treatment measures  Outcome: Progressing  Flowsheets (Taken 12/5/2024 2235)  Participates in plan/prevention/treatment measures: Elevate heels  Goal: Prevent/minimize sheer/friction injuries  Recent Flowsheet Documentation  Taken 12/5/2024 2235 by Jason Fox RN  Prevent/minimize sheer/friction injuries:   Increase activity/out of bed for meals   HOB 30 degrees or less   Turn/reposition every 2 hours/use positioning/transfer devices  Goal: Promote/optimize nutrition  Outcome: Progressing  Flowsheets (Taken 12/5/2024 2235)  Promote/optimize nutrition:   Consume > 50% meals/supplements   Monitor/record intake including meals  Goal: Promote skin healing  Recent Flowsheet Documentation  Taken 12/5/2024 2235 by Jason Fox RN  Promote skin healing:   Protective  dressings over bony prominences   Assess skin/pad under line(s)/device(s)   Turn/reposition every 2 hours/use positioning/transfer devices  Goal: Prevent/minimize sheer/friction injuries  Outcome: Progressing  Flowsheets (Taken 12/5/2024 2235)  Prevent/minimize sheer/friction injuries:   Increase activity/out of bed for meals   HOB 30 degrees or less   Turn/reposition every 2 hours/use positioning/transfer devices  Goal: Promote skin healing  Outcome: Progressing  Flowsheets (Taken 12/5/2024 2235)  Promote skin healing:   Protective dressings over bony prominences   Assess skin/pad under line(s)/device(s)   Turn/reposition every 2 hours/use positioning/transfer devices     Problem: Nutrition  Goal: Less than 5 days NPO/clear liquids  Outcome: Progressing  Goal: Oral intake greater than 50%  Outcome: Progressing  Goal: Consume prescribed supplement  Outcome: Progressing  Goal: Adequate PO fluid intake  Outcome: Progressing  Goal: Nutrition support goals are met within 48 hrs  Outcome: Progressing  Goal: Nutrition support is meeting 75% of nutrient needs  Outcome: Progressing  Goal: Lab values WNL  Outcome: Progressing  Goal: Electrolytes WNL  Outcome: Progressing  Goal: Promote healing  Outcome: Progressing  Goal: Gradual weight gain  Outcome: Progressing     Problem: Respiratory  Goal: Clear secretions with interventions this shift  Outcome: Progressing  Goal: Minimize anxiety/maximize coping throughout shift  Outcome: Progressing  Goal: Increase self care and/or family involvement in next 24 hours  Outcome: Progressing     Problem: Grooming  Goal: STG - Patient completes grooming mod assist  Outcome: Progressing

## 2024-12-06 NOTE — CARE PLAN
Problem: Fall/Injury  Goal: Verbalize understanding of personal risk factors for fall in the hospital  Outcome: Met     Problem: Skin  Goal: Prevent/minimize sheer/friction injuries  Recent Flowsheet Documentation  Taken 12/6/2024 1750 by Radha Gomez RN  Prevent/minimize sheer/friction injuries:   Use pull sheet   Turn/reposition every 2 hours/use positioning/transfer devices  Goal: Prevent/minimize sheer/friction injuries  Flowsheets (Taken 12/6/2024 1750)  Prevent/minimize sheer/friction injuries:   Use pull sheet   Turn/reposition every 2 hours/use positioning/transfer devices       Pt discharged with hospice this shift. Pt picked up by [facility]. IV's remained in place per hospice RN request. Daughter at bedside when patient picked up. Safety maintained.

## 2024-12-11 NOTE — DISCHARGE SUMMARY
Discharge Diagnosis  Pulmonary embolism on right (Multi)    // PE (pulmonary thromboembolism) (Multi)     // Failed swallow eval     // Elevated BUN      // Lactic acidosis     // Acute change in mental status.      // HTN (hypertension)     // Elevated HDL     // Incidental pulmonary nodule     // Chronic hypoxic respiratory failure, on home oxygen therapy (Multi)  Home oxygen PRN 2 L NC        Test Results Pending At Discharge  Pending Labs       Order Current Status    Creatinine Clearance, Serum and 24-Hour Urine Collected (12/03/24 1446)            Hospital Course   D/w family at length. Addressed GOC and prognosis, quality of life is limited and improvement might be limited with any form of treatment.   Family elected to go with hospice.     Pertinent Physical Exam At Time of Discharge  Physical Exam  GENERAL: Alert, no distress, cooperative  SKIN: Skin color, texture, turgor normal. No rashes or lesions.  EYES: PERRLA, EOMI  HEENT: Head: Normocephalic  Neck: supple and no adenopathy  LUNGS: Lungs clear to auscultation. Good diaphragmatic excursion.  CARDIAC: Normal S1 and S2; no rubs, murmurs, or gallops  ABDOMEN: Abdomen soft, non-tender. BS normal. No masses or organomegaly.  EXTREMITIES: No ulcers, Extremities normal. No deformities, edema, clubbing or skin discoloration.  NEURO: Cranial nerves II-XII intact, no focal deficit.     Outpatient Follow-Up  No future appointments.    Time spent on coordinating discharge was 45 mins     Obey Jennings MD

## 2024-12-16 LAB
ATRIAL RATE: 120 BPM
P AXIS: 83 DEGREES
P OFFSET: 191 MS
P ONSET: 141 MS
PR INTERVAL: 148 MS
Q ONSET: 215 MS
QRS COUNT: 19 BEATS
QRS DURATION: 140 MS
QT INTERVAL: 340 MS
QTC CALCULATION(BAZETT): 480 MS
QTC FREDERICIA: 428 MS
R AXIS: -87 DEGREES
T AXIS: 79 DEGREES
T OFFSET: 385 MS
VENTRICULAR RATE: 120 BPM